# Patient Record
Sex: FEMALE | Race: WHITE | Employment: UNEMPLOYED | URBAN - METROPOLITAN AREA
[De-identification: names, ages, dates, MRNs, and addresses within clinical notes are randomized per-mention and may not be internally consistent; named-entity substitution may affect disease eponyms.]

---

## 2017-05-18 ENCOUNTER — HOSPITAL ENCOUNTER (OUTPATIENT)
Dept: MAMMOGRAPHY | Age: 37
Discharge: HOME | End: 2017-05-18
Attending: INTERNAL MEDICINE
Payer: COMMERCIAL

## 2017-05-18 DIAGNOSIS — Z12.31 ENCOUNTER FOR SCREENING MAMMOGRAM FOR BREAST CANCER: ICD-10-CM

## 2017-05-18 PROCEDURE — 77067 SCR MAMMO BI INCL CAD: CPT

## 2018-01-02 PROBLEM — F33.9 RECURRENT DEPRESSION (HCC): Status: ACTIVE | Noted: 2018-01-02

## 2018-04-27 ENCOUNTER — HOSPITAL ENCOUNTER (OUTPATIENT)
Dept: CT IMAGING | Age: 38
Discharge: HOME | End: 2018-04-27
Attending: INTERNAL MEDICINE
Payer: COMMERCIAL

## 2018-04-27 DIAGNOSIS — R07.9 CHEST PAIN, UNSPECIFIED TYPE: ICD-10-CM

## 2018-04-27 DIAGNOSIS — R06.02 SHORTNESS OF BREATH: ICD-10-CM

## 2018-04-27 PROCEDURE — 74011000258 HC RX REV CODE- 258: Performed by: INTERNAL MEDICINE

## 2018-04-27 PROCEDURE — 71260 CT THORAX DX C+: CPT

## 2018-04-27 PROCEDURE — 74011636320 HC RX REV CODE- 636/320: Performed by: INTERNAL MEDICINE

## 2018-04-27 RX ORDER — SODIUM CHLORIDE 0.9 % (FLUSH) 0.9 %
10 SYRINGE (ML) INJECTION
Status: COMPLETED | OUTPATIENT
Start: 2018-04-27 | End: 2018-04-27

## 2018-04-27 RX ADMIN — SODIUM CHLORIDE 100 ML: 900 INJECTION, SOLUTION INTRAVENOUS at 16:03

## 2018-04-27 RX ADMIN — Medication 10 ML: at 16:03

## 2018-04-27 RX ADMIN — IOPAMIDOL 100 ML: 755 INJECTION, SOLUTION INTRAVENOUS at 16:03

## 2018-08-16 ENCOUNTER — HOSPITAL ENCOUNTER (OUTPATIENT)
Dept: MAMMOGRAPHY | Age: 38
Discharge: HOME | End: 2018-08-16
Attending: INTERNAL MEDICINE

## 2018-08-16 DIAGNOSIS — Z12.39 SCREENING BREAST EXAMINATION: ICD-10-CM

## 2018-09-20 ENCOUNTER — HOSPITAL ENCOUNTER (OUTPATIENT)
Dept: MAMMOGRAPHY | Age: 38
Discharge: HOME | End: 2018-09-20
Attending: INTERNAL MEDICINE
Payer: COMMERCIAL

## 2018-09-20 PROCEDURE — 77067 SCR MAMMO BI INCL CAD: CPT

## 2018-10-29 ENCOUNTER — APPOINTMENT (OUTPATIENT)
Dept: GENERAL RADIOLOGY | Age: 38
DRG: 139 | End: 2018-10-29
Attending: EMERGENCY MEDICINE
Payer: COMMERCIAL

## 2018-10-29 ENCOUNTER — APPOINTMENT (OUTPATIENT)
Dept: CT IMAGING | Age: 38
DRG: 139 | End: 2018-10-29
Attending: EMERGENCY MEDICINE
Payer: COMMERCIAL

## 2018-10-29 ENCOUNTER — HOSPITAL ENCOUNTER (INPATIENT)
Age: 38
LOS: 8 days | Discharge: HOME | DRG: 139 | End: 2018-11-06
Attending: EMERGENCY MEDICINE | Admitting: HOSPITALIST
Payer: COMMERCIAL

## 2018-10-29 DIAGNOSIS — E53.8 B12 DEFICIENCY: ICD-10-CM

## 2018-10-29 DIAGNOSIS — G47.00 INSOMNIA, UNSPECIFIED TYPE: ICD-10-CM

## 2018-10-29 DIAGNOSIS — J18.9 COMMUNITY ACQUIRED PNEUMONIA OF LEFT LOWER LOBE OF LUNG: ICD-10-CM

## 2018-10-29 DIAGNOSIS — K21.9 GASTROESOPHAGEAL REFLUX DISEASE WITHOUT ESOPHAGITIS: ICD-10-CM

## 2018-10-29 DIAGNOSIS — R00.0 TACHYCARDIA: ICD-10-CM

## 2018-10-29 DIAGNOSIS — R04.2 HEMOPTYSIS: ICD-10-CM

## 2018-10-29 DIAGNOSIS — E44.0 PROTEIN-CALORIE MALNUTRITION, MODERATE (HCC): ICD-10-CM

## 2018-10-29 DIAGNOSIS — R09.02 HYPOXIA: ICD-10-CM

## 2018-10-29 DIAGNOSIS — J90 PLEURAL EFFUSION: ICD-10-CM

## 2018-10-29 DIAGNOSIS — F41.1 GENERALIZED ANXIETY DISORDER: ICD-10-CM

## 2018-10-29 DIAGNOSIS — E87.6 HYPOKALEMIA: ICD-10-CM

## 2018-10-29 DIAGNOSIS — K58.1 IRRITABLE BOWEL SYNDROME WITH CONSTIPATION: ICD-10-CM

## 2018-10-29 DIAGNOSIS — J18.9 COMMUNITY ACQUIRED PNEUMONIA, UNSPECIFIED LATERALITY: Primary | ICD-10-CM

## 2018-10-29 DIAGNOSIS — J20.9 ACUTE BRONCHITIS, UNSPECIFIED ORGANISM: ICD-10-CM

## 2018-10-29 LAB
ALBUMIN SERPL-MCNC: 2 G/DL (ref 3.5–5)
ALBUMIN/GLOB SERPL: 0.4 {RATIO} (ref 1.2–3.5)
ALP SERPL-CCNC: 71 U/L (ref 50–136)
ALT SERPL-CCNC: 20 U/L (ref 12–65)
ANION GAP SERPL CALC-SCNC: 13 MMOL/L (ref 7–16)
ARTERIAL PATENCY WRIST A: ABNORMAL
AST SERPL-CCNC: 27 U/L (ref 15–37)
ATRIAL RATE: 121 BPM
BACTERIA URNS QL MICRO: ABNORMAL /HPF
BDY SITE: ABNORMAL
BILIRUB SERPL-MCNC: 1.3 MG/DL (ref 0.2–1.1)
BODY TEMPERATURE: 98.6
BUN SERPL-MCNC: 13 MG/DL (ref 6–23)
CALCIUM SERPL-MCNC: 6.9 MG/DL (ref 8.3–10.4)
CALCULATED P AXIS, ECG09: 54 DEGREES
CALCULATED R AXIS, ECG10: 76 DEGREES
CALCULATED T AXIS, ECG11: -11 DEGREES
CASTS URNS QL MICRO: 0 /LPF
CHLORIDE SERPL-SCNC: 99 MMOL/L (ref 98–107)
CO2 BLD-SCNC: 28 MMOL/L
CO2 SERPL-SCNC: 25 MMOL/L (ref 21–32)
CREAT SERPL-MCNC: 0.53 MG/DL (ref 0.6–1)
CRYSTALS URNS QL MICRO: 0 /LPF
DIAGNOSIS, 93000: NORMAL
DIFFERENTIAL METHOD BLD: ABNORMAL
EPI CELLS #/AREA URNS HPF: ABNORMAL /HPF
ERYTHROCYTE [DISTWIDTH] IN BLOOD BY AUTOMATED COUNT: 12.5 %
GAS FLOW.O2 O2 DELIVERY SYS: ABNORMAL L/MIN
GLOBULIN SER CALC-MCNC: 4.5 G/DL (ref 2.3–3.5)
GLUCOSE SERPL-MCNC: 101 MG/DL (ref 65–100)
HCO3 BLDV-SCNC: 26.6 MMOL/L (ref 23–28)
HCT VFR BLD AUTO: 33.6 % (ref 35.8–46.3)
HGB BLD-MCNC: 11.6 G/DL (ref 11.7–15.4)
LACTATE BLD-SCNC: 1.87 MMOL/L (ref 0.5–1.9)
LYMPHOCYTES # BLD: 2.3 K/UL (ref 0.5–4.6)
LYMPHOCYTES NFR BLD MANUAL: 26 % (ref 16–44)
MAGNESIUM SERPL-MCNC: 2.1 MG/DL (ref 1.8–2.4)
MCH RBC QN AUTO: 30 PG (ref 26.1–32.9)
MCHC RBC AUTO-ENTMCNC: 34.5 G/DL (ref 31.4–35)
MCV RBC AUTO: 86.8 FL (ref 79.6–97.8)
METAMYELOCYTES NFR BLD MANUAL: 1 %
MONOCYTES # BLD: 0.7 K/UL (ref 0.1–1.3)
MONOCYTES NFR BLD MANUAL: 8 % (ref 3–9)
MUCOUS THREADS URNS QL MICRO: ABNORMAL /LPF
MYELOCYTES NFR BLD MANUAL: 2 %
NEUTS BAND NFR BLD MANUAL: 25 % (ref 0–10)
NEUTS SEG # BLD: 5.7 K/UL (ref 1.7–8.2)
NEUTS SEG NFR BLD MANUAL: 38 % (ref 47–75)
NRBC # BLD: 0.02 K/UL (ref 0–0.2)
OTHER OBSERVATIONS,UCOM: ABNORMAL
P-R INTERVAL, ECG05: 130 MS
PCO2 BLDV: 35.3 MMHG (ref 41–51)
PH BLDV: 7.49 [PH] (ref 7.32–7.42)
PLATELET # BLD AUTO: 307 K/UL (ref 150–450)
PLATELET COMMENTS,PCOM: ADEQUATE
PMV BLD AUTO: 10.4 FL (ref 9.4–12.3)
PO2 BLDV: 28 MMHG
POTASSIUM SERPL-SCNC: 2.6 MMOL/L (ref 3.5–5.1)
PROT SERPL-MCNC: 6.5 G/DL (ref 6.3–8.2)
Q-T INTERVAL, ECG07: 316 MS
QRS DURATION, ECG06: 68 MS
QTC CALCULATION (BEZET), ECG08: 448 MS
RBC # BLD AUTO: 3.87 M/UL (ref 4.05–5.2)
RBC #/AREA URNS HPF: ABNORMAL /HPF
SAO2 % BLDV: 58 % (ref 65–88)
SERVICE CMNT-IMP: ABNORMAL
SODIUM SERPL-SCNC: 137 MMOL/L (ref 136–145)
SPECIMEN TYPE: ABNORMAL
TROPONIN I SERPL-MCNC: <0.02 NG/ML (ref 0.02–0.05)
VENTRICULAR RATE, ECG03: 121 BPM
WBC # BLD AUTO: 8.7 K/UL (ref 4.3–11.1)
WBC MORPH BLD: ABNORMAL
WBC URNS QL MICRO: ABNORMAL /HPF

## 2018-10-29 PROCEDURE — 99285 EMERGENCY DEPT VISIT HI MDM: CPT | Performed by: EMERGENCY MEDICINE

## 2018-10-29 PROCEDURE — BH4BZZZ ULTRASONOGRAPHY OF CHEST WALL: ICD-10-PCS | Performed by: INTERNAL MEDICINE

## 2018-10-29 PROCEDURE — 71260 CT THORAX DX C+: CPT

## 2018-10-29 PROCEDURE — 83605 ASSAY OF LACTIC ACID: CPT

## 2018-10-29 PROCEDURE — 77030032490 HC SLV COMPR SCD KNE COVD -B

## 2018-10-29 PROCEDURE — 96367 TX/PROPH/DG ADDL SEQ IV INF: CPT | Performed by: EMERGENCY MEDICINE

## 2018-10-29 PROCEDURE — 74011000250 HC RX REV CODE- 250: Performed by: EMERGENCY MEDICINE

## 2018-10-29 PROCEDURE — 83735 ASSAY OF MAGNESIUM: CPT

## 2018-10-29 PROCEDURE — 65660000000 HC RM CCU STEPDOWN

## 2018-10-29 PROCEDURE — 74011250636 HC RX REV CODE- 250/636: Performed by: EMERGENCY MEDICINE

## 2018-10-29 PROCEDURE — 94640 AIRWAY INHALATION TREATMENT: CPT

## 2018-10-29 PROCEDURE — 81015 MICROSCOPIC EXAM OF URINE: CPT

## 2018-10-29 PROCEDURE — 71046 X-RAY EXAM CHEST 2 VIEWS: CPT

## 2018-10-29 PROCEDURE — 80053 COMPREHEN METABOLIC PANEL: CPT

## 2018-10-29 PROCEDURE — 96365 THER/PROPH/DIAG IV INF INIT: CPT | Performed by: EMERGENCY MEDICINE

## 2018-10-29 PROCEDURE — 96375 TX/PRO/DX INJ NEW DRUG ADDON: CPT | Performed by: EMERGENCY MEDICINE

## 2018-10-29 PROCEDURE — 87899 AGENT NOS ASSAY W/OPTIC: CPT

## 2018-10-29 PROCEDURE — 82803 BLOOD GASES ANY COMBINATION: CPT

## 2018-10-29 PROCEDURE — 74011000258 HC RX REV CODE- 258: Performed by: EMERGENCY MEDICINE

## 2018-10-29 PROCEDURE — 84484 ASSAY OF TROPONIN QUANT: CPT

## 2018-10-29 PROCEDURE — 74011636320 HC RX REV CODE- 636/320: Performed by: EMERGENCY MEDICINE

## 2018-10-29 PROCEDURE — 93005 ELECTROCARDIOGRAM TRACING: CPT | Performed by: EMERGENCY MEDICINE

## 2018-10-29 PROCEDURE — 74011250637 HC RX REV CODE- 250/637: Performed by: EMERGENCY MEDICINE

## 2018-10-29 PROCEDURE — 85025 COMPLETE CBC W/AUTO DIFF WBC: CPT

## 2018-10-29 PROCEDURE — 81003 URINALYSIS AUTO W/O SCOPE: CPT | Performed by: EMERGENCY MEDICINE

## 2018-10-29 RX ORDER — POTASSIUM CHLORIDE 29.8 MG/ML
40 INJECTION INTRAVENOUS ONCE
Status: DISCONTINUED | OUTPATIENT
Start: 2018-10-29 | End: 2018-10-29 | Stop reason: SDUPTHER

## 2018-10-29 RX ORDER — SODIUM CHLORIDE 0.9 % (FLUSH) 0.9 %
5-10 SYRINGE (ML) INJECTION EVERY 8 HOURS
Status: DISCONTINUED | OUTPATIENT
Start: 2018-10-29 | End: 2018-11-06 | Stop reason: HOSPADM

## 2018-10-29 RX ORDER — SODIUM CHLORIDE 0.9 % (FLUSH) 0.9 %
10 SYRINGE (ML) INJECTION
Status: COMPLETED | OUTPATIENT
Start: 2018-10-29 | End: 2018-10-29

## 2018-10-29 RX ORDER — ACETAMINOPHEN 325 MG/1
650 TABLET ORAL
Status: DISCONTINUED | OUTPATIENT
Start: 2018-10-29 | End: 2018-11-06 | Stop reason: HOSPADM

## 2018-10-29 RX ORDER — GUAIFENESIN 600 MG/1
600 TABLET, EXTENDED RELEASE ORAL EVERY 12 HOURS
Status: DISCONTINUED | OUTPATIENT
Start: 2018-10-29 | End: 2018-11-06 | Stop reason: HOSPADM

## 2018-10-29 RX ORDER — NALOXONE HYDROCHLORIDE 0.4 MG/ML
0.4 INJECTION, SOLUTION INTRAMUSCULAR; INTRAVENOUS; SUBCUTANEOUS AS NEEDED
Status: DISCONTINUED | OUTPATIENT
Start: 2018-10-29 | End: 2018-11-06 | Stop reason: HOSPADM

## 2018-10-29 RX ORDER — POTASSIUM CHLORIDE 14.9 MG/ML
20 INJECTION INTRAVENOUS
Status: DISPENSED | OUTPATIENT
Start: 2018-10-29 | End: 2018-10-29

## 2018-10-29 RX ORDER — BISACODYL 5 MG
5 TABLET, DELAYED RELEASE (ENTERIC COATED) ORAL DAILY PRN
Status: DISCONTINUED | OUTPATIENT
Start: 2018-10-29 | End: 2018-11-06 | Stop reason: HOSPADM

## 2018-10-29 RX ORDER — ALBUTEROL SULFATE 0.83 MG/ML
2.5 SOLUTION RESPIRATORY (INHALATION)
Status: DISCONTINUED | OUTPATIENT
Start: 2018-10-29 | End: 2018-11-06 | Stop reason: HOSPADM

## 2018-10-29 RX ORDER — IPRATROPIUM BROMIDE AND ALBUTEROL SULFATE 2.5; .5 MG/3ML; MG/3ML
3 SOLUTION RESPIRATORY (INHALATION)
Status: COMPLETED | OUTPATIENT
Start: 2018-10-29 | End: 2018-10-29

## 2018-10-29 RX ORDER — POTASSIUM CHLORIDE 20 MEQ/1
40 TABLET, EXTENDED RELEASE ORAL
Status: COMPLETED | OUTPATIENT
Start: 2018-10-29 | End: 2018-10-29

## 2018-10-29 RX ORDER — HYDROCODONE BITARTRATE AND HOMATROPINE METHYLBROMIDE 1.5; 5 MG/5ML; MG/5ML
5 SYRUP ORAL
Status: DISCONTINUED | OUTPATIENT
Start: 2018-10-29 | End: 2018-11-06 | Stop reason: HOSPADM

## 2018-10-29 RX ORDER — AZITHROMYCIN 250 MG/1
1000 TABLET, FILM COATED ORAL
Status: COMPLETED | OUTPATIENT
Start: 2018-10-29 | End: 2018-10-29

## 2018-10-29 RX ORDER — SODIUM CHLORIDE 0.9 % (FLUSH) 0.9 %
5-10 SYRINGE (ML) INJECTION AS NEEDED
Status: DISCONTINUED | OUTPATIENT
Start: 2018-10-29 | End: 2018-11-06 | Stop reason: HOSPADM

## 2018-10-29 RX ADMIN — IOPAMIDOL 100 ML: 755 INJECTION, SOLUTION INTRAVENOUS at 20:20

## 2018-10-29 RX ADMIN — Medication 5 ML: at 23:31

## 2018-10-29 RX ADMIN — Medication 10 ML: at 20:20

## 2018-10-29 RX ADMIN — AZITHROMYCIN 1000 MG: 250 TABLET, FILM COATED ORAL at 19:19

## 2018-10-29 RX ADMIN — SODIUM CHLORIDE 1000 ML: 900 INJECTION, SOLUTION INTRAVENOUS at 19:18

## 2018-10-29 RX ADMIN — POTASSIUM CHLORIDE 20 MEQ: 14.9 INJECTION, SOLUTION INTRAVENOUS at 20:06

## 2018-10-29 RX ADMIN — SODIUM CHLORIDE 12.5 MG: 9 INJECTION INTRAMUSCULAR; INTRAVENOUS; SUBCUTANEOUS at 19:32

## 2018-10-29 RX ADMIN — IPRATROPIUM BROMIDE AND ALBUTEROL SULFATE 3 ML: .5; 3 SOLUTION RESPIRATORY (INHALATION) at 19:18

## 2018-10-29 RX ADMIN — CEFTRIAXONE SODIUM 2 G: 2 INJECTION, POWDER, FOR SOLUTION INTRAMUSCULAR; INTRAVENOUS at 19:18

## 2018-10-29 RX ADMIN — SODIUM CHLORIDE 100 ML: 900 INJECTION, SOLUTION INTRAVENOUS at 20:20

## 2018-10-29 RX ADMIN — POTASSIUM CHLORIDE 40 MEQ: 20 TABLET, EXTENDED RELEASE ORAL at 19:32

## 2018-10-29 NOTE — ED NOTES
Received report from logan conway. Pt laying in bed. A&OX4. Shallow breathes noted. Pt was 84% on RA. Placed pt on 3L N[place]. Pt stayed 88%. Placed pt on non re breather and made md aware. - nebs orderd and resp notified. Pt states she is c/o cp and sob. Pts family stated it has been going on \"for awhile\" and was diagnosed with gastritis. Pt states the oxygen makes he nauseated and wants pain meds. Made md aware.

## 2018-10-29 NOTE — ED PROVIDER NOTES
27-year-old female presenting with cough, shortness of breath and hemoptysis. States several days ago she had some fevers and today she coughed up some blood. She reports sore throat as well as nasal congestion. History of previous pneumonia. Patient is a smoker. The history is provided by the patient. No  was used. Hemoptysis Associated symptoms include cough. Pertinent negatives include no fever, no abdominal pain, no headaches and no headaches. Past Medical History:  
Diagnosis Date  Anorexia  Anxiety  Depressive disorder, not elsewhere classified 4/9/2015  GERD (gastroesophageal reflux disease)  Loss of appetite  Pneumonia 7/20/15  Unspecified vitamin B deficiency 4/9/2015  Urinary tract infection, site not specified 4/9/2015 Past Surgical History:  
Procedure Laterality Date  HX COLONOSCOPY  last 2007 Edie Godinez  HX CYST INCISION AND DRAINAGE Right   
 breast  
 HX GI    
 anal sphincterotomy  HX LAP CHOLECYSTECTOMY  2005  HX TUBAL LIGATION  2001 Family History:  
Problem Relation Age of Onset  Cancer Mother   
     breast  
 Breast Cancer Mother  No Known Problems Father Social History Socioeconomic History  Marital status:  Spouse name: Not on file  Number of children: Not on file  Years of education: Not on file  Highest education level: Not on file Social Needs  Financial resource strain: Not on file  Food insecurity - worry: Not on file  Food insecurity - inability: Not on file  Transportation needs - medical: Not on file  Transportation needs - non-medical: Not on file Occupational History  Not on file Tobacco Use  Smoking status: Current Every Day Smoker Packs/day: 1.00 Years: 17.00 Pack years: 17.00  Smokeless tobacco: Never Used Substance and Sexual Activity  Alcohol use: No  
 Drug use:  No  
  Sexual activity: Yes Birth control/protection: Surgical  
Other Topics Concern  Not on file Social History Narrative  Not on file ALLERGIES: Flagyl [metronidazole] and Zyprexa [olanzapine] Review of Systems Constitutional: Negative for fatigue and fever. HENT: Negative for sore throat. Respiratory: Positive for cough, hemoptysis and shortness of breath. Negative for chest tightness and wheezing. Hemoptysis Cardiovascular: Negative for leg swelling. Gastrointestinal: Negative for abdominal pain. Genitourinary: Negative for dysuria. Musculoskeletal: Negative for back pain. Skin: Negative for rash. Neurological: Negative for syncope and headaches. Psychiatric/Behavioral: Negative for confusion. Vitals:  
 10/29/18 1635 BP: 118/80 Pulse: (!) 123 Resp: 18 Temp: 98.3 °F (36.8 °C) SpO2: (!) 88% Physical Exam  
Constitutional: She is oriented to person, place, and time. She appears well-developed and well-nourished. She appears distressed. HENT:  
Head: Normocephalic and atraumatic. Eyes: Conjunctivae and EOM are normal. Pupils are equal, round, and reactive to light. Neck: Normal range of motion. Neck supple. Cardiovascular: Normal rate, regular rhythm and normal heart sounds. Pulmonary/Chest: Breath sounds normal. No respiratory distress. She has no wheezes. She has no rales. Poor inspiratory effort. No significant wheezes or rales appreciated Abdominal: Soft. She exhibits no distension. There is no tenderness. There is no rebound. Musculoskeletal: Normal range of motion. She exhibits no edema or tenderness. Neurological: She is alert and oriented to person, place, and time. Skin: Skin is warm and dry. No rash noted. She is not diaphoretic. Psychiatric: She has a normal mood and affect. Her behavior is normal.  
Vitals reviewed. MDM Number of Diagnoses or Management Options Community acquired pneumonia, unspecified laterality: new and requires workup Hypoxia: new and requires workup Diagnosis management comments: Patient persistently hypoxic in the emergency department CT and x-ray concerning for pneumonia. Treated with ceftriaxone and azithromycin in the emergency department. Will admit to hospitalist for further evaluation and treatment. Updated patient and family are in agreement with plan. Jennie Corona MD; 10/30/2018 @12:29 AM Voice dictation software was used during the making of this note. This software is not perfect and grammatical and other typographical errors may be present. This note has not been proofread for errors. 
=================================================================== Amount and/or Complexity of Data Reviewed Clinical lab tests: ordered and reviewed (Results for orders placed or performed during the hospital encounter of 10/29/18 
-CBC WITH AUTOMATED DIFF Result                      Value             Ref Range WBC                         8.7               4.3 - 11.1 K* 
     RBC                         3.87 (L)          4.05 - 5.2 M* HGB                         11.6 (L)          11.7 - 15.4 * HCT                         33.6 (L)          35.8 - 46.3 % MCV                         86.8              79.6 - 97.8 * MCH                         30.0              26.1 - 32.9 * MCHC                        34.5              31.4 - 35.0 * RDW                         12.5              % PLATELET                    307               150 - 450 K/* MPV                         10.4              9.4 - 12.3 FL ABSOLUTE NRBC               0.02              0.0 - 0.2 K/* NEUTROPHILS                 38 (L)            47 - 75 % BAND NEUTROPHILS            25 (H)            0 - 10 % LYMPHOCYTES                 26                16 - 44 % MONOCYTES                   8                 3 - 9 % METAMYELOCYTES              1                 % MYELOCYTES                  2                 % ABS. NEUTROPHILS            5.7               1.7 - 8.2 K/* ABS. LYMPHOCYTES            2.3               0.5 - 4.6 K/* ABS. MONOCYTES              0.7               0.1 - 1.3 K/* WBC COMMENTS                MODERATE PLATELET COMMENTS           ADEQUATE                        
     DF                          MANUAL                          
-METABOLIC PANEL, COMPREHENSIVE Result                      Value             Ref Range Sodium                      137               136 - 145 mm* Potassium                   2.6 (LL)          3.5 - 5.1 mm* Chloride                    99                98 - 107 mmo* CO2                         25                21 - 32 mmol* Anion gap                   13                7 - 16 mmol/L Glucose                     101 (H)           65 - 100 mg/* BUN                         13                6 - 23 MG/DL Creatinine                  0.53 (L)          0.6 - 1.0 MG* 
     GFR est AA                  >60               >60 ml/min/1* GFR est non-AA              >60               >60 ml/min/1* Calcium                     6.9 (L)           8.3 - 10.4 M* Bilirubin, total            1.3 (H)           0.2 - 1.1 MG* ALT (SGPT)                  20                12 - 65 U/L   
     AST (SGOT)                  27                15 - 37 U/L Alk. phosphatase            71                50 - 136 U/L Protein, total              6.5               6.3 - 8.2 g/* Albumin                     2.0 (L)           3.5 - 5.0 g/* Globulin                    4.5 (H)           2.3 - 3.5 g/* A-G Ratio                   0.4 (L)           1.2 - 3.5 -TROPONIN I Result                      Value             Ref Range Troponin-I, Qt.             <0.02 (L)         0.02 - 0.05 * 
-URINE MICROSCOPIC Result                      Value             Ref Range WBC                         5-10              0 /hpf        
     RBC                         3-5               0 /hpf Epithelial cells            5-10              0 /hpf Bacteria                    TRACE             0 /hpf Casts                       0                 0 /lpf Crystals, urine             0                 0 /LPF Mucus                       2+ (H)            0 /lpf Other observations RESULTS VERIFIED MANUALLY 
-MAGNESIUM Result                      Value             Ref Range Magnesium                   2.1               1.8 - 2.4 mg* 
-POC LACTIC ACID Result                      Value             Ref Range Lactic Acid (POC)           1.87              0.5 - 1.9 mm* 
-POC VENOUS BLOOD GAS Result                      Value             Ref Range Device:                     ROOM AIR                        
     pH, venous (POC)            7.485 (H)         7.32 - 7.42   
     pCO2, venous (POC)          35.3 (L)          41 - 51 MMHG  
     pO2, venous (POC)           28 (LL)           mmHg HCO3, venous (POC)          26.6              23 - 28 MMOL* 
     sO2, venous (POC)           58 (L)            65 - 88 % Allens test (POC) NOT APPLICABLE Site                        OTHER Patient temp. 98.6 Specimen type (POC)         VENOUS BLOOD Performed by Julianna CO2, POC                    28                MMOL/L        
-EKG, 12 LEAD, INITIAL Result                      Value             Ref Range Ventricular Rate            121               BPM           
     Atrial Rate                 121               BPM           
     P-R Interval                130               ms            
     QRS Duration                68                ms Q-T Interval                316               ms            
     QTC Calculation (Bezet)     448               ms            
     Calculated P Axis           54                degrees Calculated R Axis           76                degrees Calculated T Axis           -11               degrees Diagnosis Sinus tachycardia Non-specific ST-t wave changes Abnormal ECG No previous ECGs available Confirmed by ST ENA STOLL MD (), EDI JUAREZ ([id number]) on 10/29/2018 10:34:11 PM 
  
) Tests in the radiology section of CPT®: ordered and reviewed (Xr Chest Pa Lat Result Date: 10/29/2018 AP LATERAL CHEST X-RAY HISTORY: 3 days of shortness of breath. Weakness. Dizziness. COMPARISON: None FINDINGS: Consolidation is present throughout the left lower lobe and to a lesser extent in the right lung base. There are no large pleural effusions. Cholecystectomy clips are present. IMPRESSION: Lower lobe consolidation. Ct Chest W Cont Result Date: 10/29/2018 CT CHEST WITH CONTRAST 10/29/2018 HISTORY: Low oxygen saturation. Gastritis. Patient coughing up bright red blood. TECHNIQUE: The patient received 100 mL Isovue-370 nonionic IV contrast and axial images were obtained through the chest. Coronal reformatted images were generated.    All CT scans at this facility used dose modulation, interactive reconstruction and/or weight based dosing when appropriate to reduce radiation dose to as low as reasonably achievable. COMPARISON: April 27, 2018 FINDINGS: There are no filling defects within the main or proximal segmental pulmonary artery suggestive of emboli. The thoracic aorta is well-opacified without aneurysm or dissection. Extensive consolidation is present throughout the left lower lobe. Reticulonodular opacities are present throughout the right lower lobe, lingula and left upper lobe. A few of the right lower lobe distal airways are partially opacified. The distal esophagus is distended and contains minimal dependent fluid. There is no axillary adenopathy. A few prominent mediastinal lymph nodes are present including a prominent prevascular node and subcarinal node. These may be reactive adenopathy in the setting of pneumonia. Included images of the upper abdomen demonstrate cholecystectomy clips. The adrenal glands are normal in appearance. There are no aggressive osseous lesions. IMPRESSION: 1. No pulmonary embolism. 2. Extensive multi lobar consolidation with most significant involvement of the left lower lobe. ) Review and summarize past medical records: yes Discuss the patient with other providers: yes Independent visualization of images, tracings, or specimens: yes Risk of Complications, Morbidity, and/or Mortality Presenting problems: high Diagnostic procedures: moderate Management options: moderate Patient Progress Patient progress: stable ED Course as of Oct 30 0028 Mon Oct 29, 2018  
2124 Patient persistently hypoxic in the emergency department. CT demonstrates no evidence of pulmonary embolism but extensive findings concerning for pneumonia. We'll admit to the hospitalist for further evaluation and treatment. [JL] ED Course User Index [JL] Vadim Dudley MD  
 
 
Procedures

## 2018-10-30 LAB
ANION GAP SERPL CALC-SCNC: 8 MMOL/L (ref 7–16)
BUN SERPL-MCNC: 14 MG/DL (ref 6–23)
CALCIUM SERPL-MCNC: 6.9 MG/DL (ref 8.3–10.4)
CHLORIDE SERPL-SCNC: 107 MMOL/L (ref 98–107)
CO2 SERPL-SCNC: 25 MMOL/L (ref 21–32)
CREAT SERPL-MCNC: 0.43 MG/DL (ref 0.6–1)
CRP SERPL-MCNC: 17.1 MG/DL (ref 0–0.9)
GLUCOSE SERPL-MCNC: 97 MG/DL (ref 65–100)
POTASSIUM SERPL-SCNC: 3.2 MMOL/L (ref 3.5–5.1)
PROCALCITONIN SERPL-MCNC: 0.9 NG/ML
SODIUM SERPL-SCNC: 140 MMOL/L (ref 136–145)

## 2018-10-30 PROCEDURE — 94640 AIRWAY INHALATION TREATMENT: CPT

## 2018-10-30 PROCEDURE — 86038 ANTINUCLEAR ANTIBODIES: CPT

## 2018-10-30 PROCEDURE — 74011250636 HC RX REV CODE- 250/636: Performed by: FAMILY MEDICINE

## 2018-10-30 PROCEDURE — 86140 C-REACTIVE PROTEIN: CPT

## 2018-10-30 PROCEDURE — 87070 CULTURE OTHR SPECIMN AEROBIC: CPT

## 2018-10-30 PROCEDURE — 74011000258 HC RX REV CODE- 258: Performed by: INTERNAL MEDICINE

## 2018-10-30 PROCEDURE — 65660000000 HC RM CCU STEPDOWN

## 2018-10-30 PROCEDURE — 74011250636 HC RX REV CODE- 250/636: Performed by: INTERNAL MEDICINE

## 2018-10-30 PROCEDURE — 99254 IP/OBS CNSLTJ NEW/EST MOD 60: CPT | Performed by: INTERNAL MEDICINE

## 2018-10-30 PROCEDURE — 86235 NUCLEAR ANTIGEN ANTIBODY: CPT

## 2018-10-30 PROCEDURE — 94760 N-INVAS EAR/PLS OXIMETRY 1: CPT

## 2018-10-30 PROCEDURE — 36415 COLL VENOUS BLD VENIPUNCTURE: CPT

## 2018-10-30 PROCEDURE — 87205 SMEAR GRAM STAIN: CPT

## 2018-10-30 PROCEDURE — 87040 BLOOD CULTURE FOR BACTERIA: CPT

## 2018-10-30 PROCEDURE — 84145 PROCALCITONIN (PCT): CPT

## 2018-10-30 PROCEDURE — 74011250637 HC RX REV CODE- 250/637: Performed by: INTERNAL MEDICINE

## 2018-10-30 PROCEDURE — 74011250637 HC RX REV CODE- 250/637: Performed by: HOSPITALIST

## 2018-10-30 PROCEDURE — 74011250636 HC RX REV CODE- 250/636: Performed by: HOSPITALIST

## 2018-10-30 PROCEDURE — 74011000250 HC RX REV CODE- 250: Performed by: INTERNAL MEDICINE

## 2018-10-30 PROCEDURE — 80048 BASIC METABOLIC PNL TOTAL CA: CPT

## 2018-10-30 RX ORDER — PANTOPRAZOLE SODIUM 40 MG/1
40 TABLET, DELAYED RELEASE ORAL DAILY
Status: DISCONTINUED | OUTPATIENT
Start: 2018-10-31 | End: 2018-10-30 | Stop reason: SDUPTHER

## 2018-10-30 RX ORDER — SODIUM CHLORIDE FOR INHALATION 3 %
4 VIAL, NEBULIZER (ML) INHALATION ONCE
Status: COMPLETED | OUTPATIENT
Start: 2018-10-30 | End: 2018-10-30

## 2018-10-30 RX ORDER — POTASSIUM CHLORIDE 20 MEQ/1
40 TABLET, EXTENDED RELEASE ORAL 2 TIMES DAILY
Status: COMPLETED | OUTPATIENT
Start: 2018-10-30 | End: 2018-10-31

## 2018-10-30 RX ORDER — ONDANSETRON 2 MG/ML
4 INJECTION INTRAMUSCULAR; INTRAVENOUS
Status: DISCONTINUED | OUTPATIENT
Start: 2018-10-30 | End: 2018-11-06 | Stop reason: HOSPADM

## 2018-10-30 RX ORDER — SUCRALFATE 1 G/10ML
0.5 SUSPENSION ORAL
Status: DISCONTINUED | OUTPATIENT
Start: 2018-10-30 | End: 2018-11-06 | Stop reason: HOSPADM

## 2018-10-30 RX ORDER — PANTOPRAZOLE SODIUM 40 MG/1
40 TABLET, DELAYED RELEASE ORAL
Status: DISCONTINUED | OUTPATIENT
Start: 2018-10-31 | End: 2018-11-02 | Stop reason: ALTCHOICE

## 2018-10-30 RX ORDER — SODIUM CHLORIDE 9 MG/ML
50 INJECTION, SOLUTION INTRAVENOUS CONTINUOUS
Status: DISCONTINUED | OUTPATIENT
Start: 2018-10-31 | End: 2018-11-01

## 2018-10-30 RX ORDER — PROMETHAZINE HYDROCHLORIDE 25 MG/1
25 TABLET ORAL
Status: DISCONTINUED | OUTPATIENT
Start: 2018-10-30 | End: 2018-11-06 | Stop reason: HOSPADM

## 2018-10-30 RX ORDER — LEVOTHYROXINE SODIUM 50 UG/1
25 TABLET ORAL
Status: DISCONTINUED | OUTPATIENT
Start: 2018-10-31 | End: 2018-11-06 | Stop reason: HOSPADM

## 2018-10-30 RX ORDER — MELATONIN
2000 DAILY
Status: DISCONTINUED | OUTPATIENT
Start: 2018-10-31 | End: 2018-11-06 | Stop reason: HOSPADM

## 2018-10-30 RX ORDER — QUETIAPINE FUMARATE 100 MG/1
200 TABLET, FILM COATED ORAL
Status: DISCONTINUED | OUTPATIENT
Start: 2018-10-30 | End: 2018-11-06 | Stop reason: HOSPADM

## 2018-10-30 RX ADMIN — Medication 10 ML: at 06:03

## 2018-10-30 RX ADMIN — PROMETHAZINE HYDROCHLORIDE 25 MG: 25 TABLET ORAL at 18:25

## 2018-10-30 RX ADMIN — AZITHROMYCIN MONOHYDRATE 500 MG: 500 INJECTION, POWDER, LYOPHILIZED, FOR SOLUTION INTRAVENOUS at 17:21

## 2018-10-30 RX ADMIN — SODIUM CHLORIDE 100 ML/HR: 900 INJECTION, SOLUTION INTRAVENOUS at 23:54

## 2018-10-30 RX ADMIN — AMPICILLIN SODIUM AND SULBACTAM SODIUM 3 G: 2; 1 INJECTION, POWDER, FOR SOLUTION INTRAMUSCULAR; INTRAVENOUS at 19:32

## 2018-10-30 RX ADMIN — HYDROCODONE BITARTRATE AND HOMATROPINE METHYLBROMIDE 5 ML: 5; 1.5 SOLUTION ORAL at 19:36

## 2018-10-30 RX ADMIN — SODIUM CHLORIDE 30 MG/ML INHALATION SOLUTION 4 ML: 30 SOLUTION INHALANT at 13:41

## 2018-10-30 RX ADMIN — ACETAMINOPHEN 650 MG: 325 TABLET, FILM COATED ORAL at 08:54

## 2018-10-30 RX ADMIN — SUCRALFATE 0.5 G: 1 SUSPENSION ORAL at 21:43

## 2018-10-30 RX ADMIN — QUETIAPINE FUMARATE 200 MG: 100 TABLET ORAL at 21:43

## 2018-10-30 RX ADMIN — AMPICILLIN SODIUM AND SULBACTAM SODIUM 3 G: 2; 1 INJECTION, POWDER, FOR SOLUTION INTRAMUSCULAR; INTRAVENOUS at 14:02

## 2018-10-30 RX ADMIN — POTASSIUM CHLORIDE 40 MEQ: 20 TABLET, EXTENDED RELEASE ORAL at 17:20

## 2018-10-30 RX ADMIN — ONDANSETRON 4 MG: 2 INJECTION INTRAMUSCULAR; INTRAVENOUS at 06:03

## 2018-10-30 RX ADMIN — SUCRALFATE 0.5 G: 1 SUSPENSION ORAL at 17:20

## 2018-10-30 RX ADMIN — GUAIFENESIN 600 MG: 600 TABLET, EXTENDED RELEASE ORAL at 21:43

## 2018-10-30 RX ADMIN — Medication 10 ML: at 21:43

## 2018-10-30 RX ADMIN — GUAIFENESIN 600 MG: 600 TABLET, EXTENDED RELEASE ORAL at 08:54

## 2018-10-30 RX ADMIN — ACETAMINOPHEN 650 MG: 325 TABLET, FILM COATED ORAL at 14:01

## 2018-10-30 RX ADMIN — Medication 5 ML: at 15:16

## 2018-10-30 RX ADMIN — PROMETHAZINE HYDROCHLORIDE 25 MG: 25 TABLET ORAL at 14:01

## 2018-10-30 RX ADMIN — HYDROCODONE BITARTRATE AND HOMATROPINE METHYLBROMIDE 5 ML: 5; 1.5 SOLUTION ORAL at 01:36

## 2018-10-30 NOTE — PROGRESS NOTES
Alert and oriented  Remote sinus tach cough is productive however patient keeps putting tissue in specimen collection  Instructed to spit sputum in cup  Says she will try  Oxygen via cannula  Refuses breakfast  Offered to get another food selection  Patient declines

## 2018-10-30 NOTE — PROGRESS NOTES
Patient slept off and on throughout shift. Patient awake in bed at this time. Respirations present. No signs of distress at this time. Bed low and locked. Call light within reach. Will continue to monitor.

## 2018-10-30 NOTE — H&P
Hospitalist H&P/Consult Note Admit Date:  10/29/2018  6:38 PM  
Name:  Araceli Ruffin Age:  45 y.o. 
:  1980 MRN:  [id number] PCP:  Toni Whitehead DO Treatment Team: Attending Provider: Olivia Arnett MD 
 
HPI:  
Patient is a 43 y/o female with hx GERD, anxiety, depression, hypothyroidism, anorexia who presents to ED with 1 week of progressive shortness of breath, cough and now hemoptysis. Was seen last week and thought she had gastritis and received medications for this. Saw her PCP today and was tachypneic, tachycardic with chest pain and hemoptysis. Then sent to ED to r/o PE or pneumonia. CT of chest negative for PE but she has extensive consolidation in left lower lobe and opacities in RLL, lingula and LLL. O2 sats were 87% on RA. She has no known lung disease such as asthma or COPD but does smoke. She received IV rocephin and azithromycin in ED. Hospitalist service consulted for admission and further treatment. 10 systems reviewed and negative except as noted in HPI. Past Medical History:  
Diagnosis Date  Anorexia  Anxiety  Depressive disorder, not elsewhere classified 2015  GERD (gastroesophageal reflux disease)  Loss of appetite  Pneumonia 7/20/15  Unspecified vitamin B deficiency 2015  Urinary tract infection, site not specified 2015 Past Surgical History:  
Procedure Laterality Date  HX COLONOSCOPY  last  [facility]  HX CYST INCISION AND DRAINAGE Right   
 breast  
 HX GI    
 anal sphincterotomy  HX LAP CHOLECYSTECTOMY    HX TUBAL LIGATION   Prior to Admission Medications Prescriptions Last Dose Informant Patient Reported? Taking? Cholecalciferol, Vitamin D3, (VITAMIN D3) 2,000 unit cap capsule   No No  
Sig: Take 2,000 Units by mouth two (2) times a day. Indications: VITAMIN D DEFICIENCY  
QUEtiapine (SEROQUEL) 100 mg tablet   No No  
Sig: Take 2 Tabs by mouth nightly. SURPRISE!  No problems.  Normal fetal anatomy ultrasound with concordant dating.  Reviewed ultrasound and limitations with patient.    cyanocobalamin (VITAMIN B12) 1,000 mcg/mL injection   No No  
Sig: Needles/syringe-50#Dx b12 deficiency,,,DOSE 1 CC EVERY 2 WEEKS  
levothyroxine (SYNTHROID) 25 mcg tablet   No No  
Si a day empty stomach  Indications: hypothyroidism  
pantoprazole (PROTONIX) 40 mg tablet   No No  
Sig: Take 1 Tab by mouth daily. promethazine (PHENERGAN) 25 mg tablet   No No  
Sig: Take 1 Tab by mouth every six (6) hours as needed for Nausea. sucralfate (CARAFATE) 100 mg/mL suspension   No No  
Sig: Take 5 mL by mouth four (4) times daily. Facility-Administered Medications: None Allergies Allergen Reactions  Flagyl [Metronidazole] Nausea and Vomiting  Zyprexa [Olanzapine] Other (comments) Upset stomach Social History Tobacco Use  Smoking status: Current Every Day Smoker Packs/day: 1.00 Years: 17.00 Pack years: 17.00  Smokeless tobacco: Never Used Substance Use Topics  Alcohol use: No  
  
Family History Problem Relation Age of Onset  Cancer Mother   
     breast  
 Breast Cancer Mother  No Known Problems Father Immunization History Administered Date(s) Administered  Influenza Vaccine 10/07/2015, 10/03/2017  Influenza Vaccine (Quad) PF 10/04/2016, 10/18/2018 Objective:  
 
Patient Vitals for the past 24 hrs: 
 Temp Pulse Resp BP SpO2  
10/29/18 2152  (!) 116  102/75 96 % 10/29/18 2103  (!) 120  (!) 117/92 92 % 10/29/18 1930     92 % 10/29/18 1920     93 % 10/29/18 1918     (!) 88 % 10/29/18 1906     (!) 88 % 10/29/18 190  (!) 118 28 119/72 (!) 88 % 10/29/18 1635 98.3 °F (36.8 °C) (!) 123 18 118/80 (!) 88 % Oxygen Therapy O2 Sat (%): 96 % (10/29/18 2152) Pulse via Oximetry: 125 beats per minute (10/29/18 1918) O2 Device: Non-rebreather mask (10/29/18 2152) O2 Flow Rate (L/min): 15 l/min (10/29/18 2152) ETCO2 (mmHg): 3 mmHg (10/29/18 1906) No intake or output data in the 24 hours ending 10/29/18 0530 Physical Exam: 
General:    thin. Alert. Ill-appearing Eyes:   Normal sclera. Extraocular movements intact. ENT:  Normocephalic, atraumatic. Moist mucous membranes CV:   tachycardic. No murmur, rub, or gallop. Lungs: Tachypneic, bilateral rales L>R, some wheeze Abdomen: Soft, nontender, nondistended. Bowel sounds normal.  
Extremities: Warm and dry. No cyanosis or edema. Neurologic: CN II-XII grossly intact. Sensation intact. Skin:     No rashes or jaundice. No wounds. decreased turgor Psych:  Normal mood and flat affect. I reviewed the labs, imaging, EKGs, telemetry, and other studies done this admission. Data Review:  
Recent Results (from the past 24 hour(s)) AMB POC HEMOGLOBIN (HGB) Collection Time: 10/29/18  3:03 PM  
Result Value Ref Range Hemoglobin (POC) 11.2 EKG, 12 LEAD, INITIAL Collection Time: 10/29/18  4:39 PM  
Result Value Ref Range Ventricular Rate 121 BPM  
 Atrial Rate 121 BPM  
 P-R Interval 130 ms QRS Duration 68 ms Q-T Interval 316 ms  
 QTC Calculation (Bezet) 448 ms Calculated P Axis 54 degrees Calculated R Axis 76 degrees Calculated T Axis -11 degrees Diagnosis Sinus tachycardia Non-specific ST-t wave changes Abnormal ECG No previous ECGs available Confirmed by ST ENA STOLL MD (), EDI JUAREZ ([id number]) on 10/29/2018 10:34:11 PM 
  
CBC WITH AUTOMATED DIFF Collection Time: 10/29/18  4:45 PM  
Result Value Ref Range WBC 8.7 4.3 - 11.1 K/uL  
 RBC 3.87 (L) 4.05 - 5.2 M/uL  
 HGB 11.6 (L) 11.7 - 15.4 g/dL HCT 33.6 (L) 35.8 - 46.3 % MCV 86.8 79.6 - 97.8 FL  
 MCH 30.0 26.1 - 32.9 PG  
 MCHC 34.5 31.4 - 35.0 g/dL  
 RDW 12.5 % PLATELET 049 570 - 340 K/uL MPV 10.4 9.4 - 12.3 FL ABSOLUTE NRBC 0.02 0.0 - 0.2 K/uL NEUTROPHILS 38 (L) 47 - 75 % BAND NEUTROPHILS 25 (H) 0 - 10 % LYMPHOCYTES 26 16 - 44 % MONOCYTES 8 3 - 9 % METAMYELOCYTES 1 % MYELOCYTES 2 %  
 ABS. NEUTROPHILS 5.7 1.7 - 8.2 K/UL  
 ABS. LYMPHOCYTES 2.3 0.5 - 4.6 K/UL  
 ABS. MONOCYTES 0.7 0.1 - 1.3 K/UL  
 WBC COMMENTS MODERATE PLATELET COMMENTS ADEQUATE    
 DF MANUAL METABOLIC PANEL, COMPREHENSIVE Collection Time: 10/29/18  4:45 PM  
Result Value Ref Range Sodium 137 136 - 145 mmol/L Potassium 2.6 (LL) 3.5 - 5.1 mmol/L Chloride 99 98 - 107 mmol/L  
 CO2 25 21 - 32 mmol/L Anion gap 13 7 - 16 mmol/L Glucose 101 (H) 65 - 100 mg/dL BUN 13 6 - 23 MG/DL Creatinine 0.53 (L) 0.6 - 1.0 MG/DL  
 GFR est AA >60 >60 ml/min/1.73m2 GFR est non-AA >60 >60 ml/min/1.73m2 Calcium 6.9 (L) 8.3 - 10.4 MG/DL Bilirubin, total 1.3 (H) 0.2 - 1.1 MG/DL  
 ALT (SGPT) 20 12 - 65 U/L  
 AST (SGOT) 27 15 - 37 U/L Alk. phosphatase 71 50 - 136 U/L Protein, total 6.5 6.3 - 8.2 g/dL Albumin 2.0 (L) 3.5 - 5.0 g/dL Globulin 4.5 (H) 2.3 - 3.5 g/dL A-G Ratio 0.4 (L) 1.2 - 3.5    
TROPONIN I Collection Time: 10/29/18  4:45 PM  
Result Value Ref Range Troponin-I, Qt. <0.02 (L) 0.02 - 0.05 NG/ML  
MAGNESIUM Collection Time: 10/29/18  4:45 PM  
Result Value Ref Range Magnesium 2.1 1.8 - 2.4 mg/dL POC LACTIC ACID Collection Time: 10/29/18  4:49 PM  
Result Value Ref Range Lactic Acid (POC) 1.87 0.5 - 1.9 mmol/L  
POC VENOUS BLOOD GAS Collection Time: 10/29/18  7:13 PM  
Result Value Ref Range Device: ROOM AIR    
 pH, venous (POC) 7.485 (H) 7.32 - 7.42    
 pCO2, venous (POC) 35.3 (L) 41 - 51 MMHG  
 pO2, venous (POC) 28 (LL) mmHg HCO3, venous (POC) 26.6 23 - 28 MMOL/L  
 sO2, venous (POC) 58 (L) 65 - 88 % Allens test (POC) NOT APPLICABLE Site OTHER Patient temp. 98.6 Specimen type (POC) VENOUS BLOOD Performed by Julianna   
 CO2, POC 28 MMOL/L  
URINE MICROSCOPIC Collection Time: 10/29/18  7:27 PM  
Result Value Ref Range WBC 5-10 0 /hpf  
 RBC 3-5 0 /hpf  Epithelial cells 5-10 0 /hpf  
 Bacteria TRACE 0 /hpf Casts 0 0 /lpf Crystals, urine 0 0 /LPF Mucus 2+ (H) 0 /lpf Other observations RESULTS VERIFIED MANUALLY Imaging Studies: CXR Results  (Last 48 hours) 10/29/18 1735  XR CHEST PA LAT Final result Impression:  IMPRESSION: Lower lobe consolidation. Narrative:  AP LATERAL CHEST X-RAY HISTORY: 3 days of shortness of breath. Weakness. Dizziness. COMPARISON: None FINDINGS: Consolidation is present throughout the left lower lobe and to a  
lesser extent in the right lung base. There are no large pleural effusions. Cholecystectomy clips are present. CT Results  (Last 48 hours) 10/29/18 2048  CT CHEST W CONT Final result Impression:  IMPRESSION:  
   
1. No pulmonary embolism. 2. Extensive multi lobar consolidation with most significant involvement of the  
left lower lobe. Narrative:  CT CHEST WITH CONTRAST 10/29/2018 HISTORY: Low oxygen saturation. Gastritis. Patient coughing up bright red blood. TECHNIQUE: The patient received 100 mL Isovue-370 nonionic IV contrast and axial  
images were obtained through the chest. Coronal reformatted images were  
generated. All CT scans at this facility used dose modulation, interactive  
reconstruction and/or weight based dosing when appropriate to reduce radiation  
dose to as low as reasonably achievable. COMPARISON: April 27, 2018 FINDINGS: There are no filling defects within the main or proximal segmental  
pulmonary artery suggestive of emboli. The thoracic aorta is well-opacified  
without aneurysm or dissection. Extensive consolidation is present throughout the left lower lobe. Reticulonodular opacities are present throughout the right lower lobe, lingula  
and left upper lobe. A few of the right lower lobe distal airways are partially  
opacified. The distal esophagus is distended and contains minimal dependent fluid.  
   
There is no axillary adenopathy. A few prominent mediastinal lymph nodes are  
present including a prominent prevascular node and subcarinal node. These may be  
reactive adenopathy in the setting of pneumonia. Included images of the upper abdomen demonstrate cholecystectomy clips. The  
adrenal glands are normal in appearance. There are no aggressive osseous  
lesions. Assessment and Plan:  
 
Hospital Problems as of 10/29/2018 Date Reviewed: 10/29/2018 Codes Class Noted - Resolved POA * (Principal) CAP (community acquired pneumonia) ICD-10-CM: J18.9 ICD-9-CM: 995  10/29/2018 - Present Yes Hypokalemia ICD-10-CM: E87.6 ICD-9-CM: 276.8  10/29/2018 - Present Yes Hemoptysis ICD-10-CM: R04.2 ICD-9-CM: 786.30  10/29/2018 - Present Yes Hypoxia ICD-10-CM: R09.02 
ICD-9-CM: 799.02  10/29/2018 - Present Yes Tachycardia ICD-10-CM: R00.0 ICD-9-CM: 785.0  10/29/2018 - Present Yes Esophageal reflux ICD-10-CM: K21.9 ICD-9-CM: 530.81  4/9/2015 - Present Yes PLAN: 
· Admit patient to remote telemetry, Inpatient · Continue IV rocephin and azithromycin for CAP 
· F/u blood cultures. Send sputum for culture · Check strep pneumo urine antigem · Flutter valve, prn albuterol nebulizers · Hycodan and mucinex for cough and congestion · Consult Pulmonary in am regarding hemoptysis · Use SCDs for DVT prophylaxis. No blood thinners w hemoptysis · Supplemental O2 to maintain O2 sats in 90s · Continue to replace potassium. · Provide smoking cessation counseling · Discussed with patient's mother at bedside Estimated LOS:  Greater than 2 midnights Signed: 
Tani Carvajal MD

## 2018-10-30 NOTE — PROGRESS NOTES
PRN tylenol given for c/o generalized pain. PRN phenergan given c/o nausea. Patient unable to swallow phenergan.

## 2018-10-30 NOTE — H&P (VIEW-ONLY)
CONSULT NOTE Gunner Schmidtster 10/30/2018 Date of Admission:  10/29/2018 The patient's chart is reviewed and the patient is discussed with the staff. Subjective:  
 
Patient is a 45 y.o.  female seen and evaluated at the request of Dr. Myron Montano. She has h/o smoking, though quit 1 year ago and was vaping for the past year. H/o pneumonia in 2015 which she doesn't remember the specifics for. She denies h/o asthma or COPD. She normally is able to walk around without dyspnea and denies cough, however starting about 4 days ago she started to feel poorly with subjective fever, cough and shortness of breath. She was admitted yesterday following CXR and CT showing pneumonia L>R bases. She also reported an episode of scant hemoptysis, but has not produced much over the past 16 hours of admission. She required O2 and has been weaned to 10L HFNC at this time. She reports history of GERD and previously was on PPI. Has head of bed elevated and since doing this has had less reflux symptoms. Has had aspiration episodes in past with reflux, but no overt pneumonia and none recently. Stays at home all day, doesn't work, no infectious contacts.  perhaps had a viral GI infection past week. Review of Systems A comprehensive review of systems was negative except for that written in the HPI. Patient Active Problem List  
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  
  Esophageal reflux K21.9  Nausea with vomiting R11.2  Other enthesopathy of ankle and tarsus M77.50  
 Other disorders of synovium, tendon, and bursa(727.89) M67.89  
 Acute bronchitis J20.9  Underweight R63.6  Unspecified constipation K59.00  Ingrowing nail L60.0  Irregular menstrual cycle N92.6  Recurrent depression ([address] F33.9  Hypokalemia E87.6  CAP (community acquired pneumonia) J18.9  Hemoptysis R04.2  Hypoxia R09.02  
 Tachycardia R00.0 Prior to Admission Medications Prescriptions Last Dose Informant Patient Reported? Taking? Cholecalciferol, Vitamin D3, (VITAMIN D3) 2,000 unit cap capsule Unknown at Unknown time  No No  
Sig: Take 2,000 Units by mouth two (2) times a day. Indications: VITAMIN D DEFICIENCY  
QUEtiapine (SEROQUEL) 100 mg tablet Unknown at Unknown time  No No  
Sig: Take 2 Tabs by mouth nightly. cyanocobalamin (VITAMIN B12) 1,000 mcg/mL injection 10/22/2018 at Unknown time  No Yes Sig: Needles/syringe-50#Dx b12 deficiency,,,DOSE 1 CC EVERY 2 WEEKS  
levothyroxine (SYNTHROID) 25 mcg tablet Unknown at Unknown time  No No  
Si a day empty stomach  Indications: hypothyroidism  
pantoprazole (PROTONIX) 40 mg tablet Unknown at Unknown time  No No  
Sig: Take 1 Tab by mouth daily. promethazine (PHENERGAN) 25 mg tablet Unknown at Unknown time  No No  
Sig: Take 1 Tab by mouth every six (6) hours as needed for Nausea. sucralfate (CARAFATE) 100 mg/mL suspension Unknown at Unknown time  No No  
Sig: Take 5 mL by mouth four (4) times daily. Facility-Administered Medications: None Past Medical History:  
Diagnosis Date  Anorexia  Anxiety  Depressive disorder, not elsewhere classified 2015  GERD (gastroesophageal reflux disease)  Loss of appetite  Pneumonia 7/20/15  Unspecified vitamin B deficiency 2015  Urinary tract infection, site not specified 2015 Past Surgical History: Procedure Laterality Date  HX COLONOSCOPY  last 2007 Farrel Coad  HX CYST INCISION AND DRAINAGE Right   
 breast  
 HX GI    
 anal sphincterotomy  HX LAP CHOLECYSTECTOMY  2005  HX TUBAL LIGATION  2001 Social History Socioeconomic History  Marital status:  Spouse name: Not on file  Number of children: Not on file  Years of education: Not on file  Highest education level: Not on file Social Needs  Financial resource strain: Not on file  Food insecurity - worry: Not on file  Food insecurity - inability: Not on file  Transportation needs - medical: Not on file  Transportation needs - non-medical: Not on file Occupational History  Not on file Tobacco Use  Smoking status: Current Every Day Smoker Packs/day: 1.00 Years: 17.00 Pack years: 17.00  Smokeless tobacco: Never Used Substance and Sexual Activity  Alcohol use: No  
 Drug use: No  
 Sexual activity: Yes Birth control/protection: Surgical  
Other Topics Concern  Not on file Social History Narrative  Not on file Family History Problem Relation Age of Onset  Cancer Mother   
     breast  
 Breast Cancer Mother  No Known Problems Father Allergies Allergen Reactions  Flagyl [Metronidazole] Nausea and Vomiting  Zyprexa [Olanzapine] Other (comments) Upset stomach Current Facility-Administered Medications Medication Dose Route Frequency  ondansetron (ZOFRAN) injection 4 mg  4 mg IntraVENous Q4H PRN  
 sodium chloride (NS) flush 5-10 mL  5-10 mL IntraVENous Q8H  
 sodium chloride (NS) flush 5-10 mL  5-10 mL IntraVENous PRN  
 azithromycin (ZITHROMAX) 500 mg in 0.9% sodium chloride (MBP/ADV) 250 mL  500 mg IntraVENous Q24H  cefTRIAXone (ROCEPHIN) 1 g in 0.9% sodium chloride (MBP/ADV) 50 mL  1 g IntraVENous Q24H  
 acetaminophen (TYLENOL) tablet 650 mg  650 mg Oral Q4H PRN  
  naloxone (NARCAN) injection 0.4 mg  0.4 mg IntraVENous PRN  
 albuterol (PROVENTIL VENTOLIN) nebulizer solution 2.5 mg  2.5 mg Nebulization Q4H PRN  
 bisacodyl (DULCOLAX) tablet 5 mg  5 mg Oral DAILY PRN  
 guaiFENesin ER (MUCINEX) tablet 600 mg  600 mg Oral Q12H  
 HYDROcodone-homatropine (HYCODAN) 5-1.5 mg/5 mL (5 mL) syrup 5 mL  5 mL Oral Q4H PRN Objective:  
 
Vitals:  
 10/30/18 3588 10/30/18 2178 10/30/18 3219 10/30/18 1107 BP:  99/66 94/59 102/72 Pulse: (!) 112 (!) 109 98 99 Resp:  20 20 Temp:  100 °F (37.8 °C) 98.5 °F (36.9 °C) 97.9 °F (36.6 °C) SpO2:  98% 98% 93% PHYSICAL EXAM  
 
Constitutional:  the patient is well developed and in no acute distress EENMT:  Sclera clear, pupils equal, oral mucosa moist 
Respiratory: rales mostly in bases Cardiovascular:  RRR without M,G,R 
Gastrointestinal: soft and non-tender; with positive bowel sounds. Musculoskeletal: warm without cyanosis. There is no lower leg edema. Skin:  no jaundice or rashes, no wounds Neurologic: no gross neuro deficits Psychiatric:  alert and oriented x 4 CXR:  L>R basilar infiltrates. CT Chest: L>R basilar infiltrates. Recent Labs 10/29/18 
1645 WBC 8.7 HGB 11.6* HCT 33.6*  
 Recent Labs 10/30/18 
2085 10/29/18 
1645  137  
K 3.2* 2.6*  
 99 GLU 97 101* CO2 25 25 BUN 14 13 CREA 0.43* 0.53* MG  --  2.1 CA 6.9* 6.9*  
TROIQ  --  <0.02* ALB  --  2.0*  
TBILI  --  1.3* ALT  --  20 SGOT  --  27 No results for input(s): PH, PCO2, PO2, HCO3 in the last 72 hours. No results for input(s): LCAD, LAC in the last 72 hours. Assessment:  (Medical Decision Making) Hospital Problems  Date Reviewed: 10/29/2018 Codes Class Noted POA Hypokalemia ICD-10-CM: E87.6 ICD-9-CM: 276.8  10/29/2018 Yes * (Principal) CAP (community acquired pneumonia) ICD-10-CM: J18.9 ICD-9-CM: 088  10/29/2018 Yes Hemoptysis ICD-10-CM: R04.2 ICD-9-CM: 786.30  10/29/2018 Yes Hypoxia ICD-10-CM: R09.02 
ICD-9-CM: 799.02  10/29/2018 Yes Tachycardia ICD-10-CM: R00.0 ICD-9-CM: 785.0  10/29/2018 Yes Anorexia nervosa ICD-10-CM: F50.00 ICD-9-CM: 307.1  4/9/2015 Yes Esophageal reflux ICD-10-CM: K21.9 ICD-9-CM: 530.81  4/9/2015 Yes 46 y/o female prior smoker with GERD, anorexia with pneumonia concerning for aspiration given nodular and lower lobe appearance on CT and dilated esophagus on CT. Plan:  (Medical Decision Making) --Would broaden for anaerobic coverage-->ceftriaxone to Unasyn for now (could easily switch to Augmentin later) --Induce sputum for cultures (resp, afb, fungal) --No risk factors for TB 
--check blood cultures --check ARGELIA and Scl-70 (esophageal dysmotility and tight skin?) --when improved consider barium swallow vs GI consult vs GI follow up as outpatient 
--if unable to produce sputum could attempt bronch, though O2 requirement is somewhat limiting currently 
--add PPI for GERD More than 50% of the time documented was spent in face-to-face contact with the patient and in the care of the patient on the floor/unit where the patient is located. Thank you very much for this referral.  We appreciate the opportunity to participate in this patient's care. Will follow along with above stated plan.  
 
Sherry Crespo MD

## 2018-10-30 NOTE — PROGRESS NOTES
Progress Note Patient: Marilou Lucas MRN: [id number]  SSN: [id number] YOB: 1980  Age: 45 y.o. Sex: female Admit Date: 10/29/2018 LOS: 1 day Subjective: No new complaints. Poor appetite. Patient with developmental delay but high functioning per father. She is  and lives with spouse, but parents live near her and supervise her and take care of her finances. Chronic poor eater, but father said good weight gain over past several years. Second episode of pneumonia in past one year per father. Objective:  
 
Vitals:  
 10/29/18 2339 10/30/18 1987 10/30/18 4819 10/30/18 7256 BP: 104/69  99/66 94/59 Pulse: (!) 110 (!) 112 (!) 109 98 Resp: (!) 38  20 20 Temp: 99.4 °F (37.4 °C)  100 °F (37.8 °C) 98.5 °F (36.9 °C) SpO2: 94%  98% 98% Intake and Output: 
Current Shift: No intake/output data recorded. Last three shifts: No intake/output data recorded. Physical Exam:  
 
General:  Alert, cooperative, no distress,father reports cognition on par with pre-teen per psychiatrists. Eyes:  Conjunctivae/corneas clear. PERRL, EOMs intact. Fundi benign Ears:  Normal TMs and external ear canals both ears. Nose: Nares normal. Septum midline. Mucosa normal. No drainage or sinus tenderness. Mouth/Throat: Lips, mucosa, and tongue normal. Teeth and gums normal.  
Neck: Supple, symmetrical, trachea midline, no adenopathy, thyroid: no enlargment/tenderness/nodules, no carotid bruit and no JVD. Back:   Symmetric, no curvature. ROM normal. No CVA tenderness. Lungs:   No gross wheeze. Few scattered rhonchi. Heart:  Regular rate and rhythm, S1, S2 normal, no murmur, click, rub or gallop. Abdomen:   Soft, non-tender. Bowel sounds normal. No masses,  No organomegaly. Extremities: Extremities normal, atraumatic, no cyanosis or edema. Pulses: 2+ and symmetric all extremities. Skin: Skin color, texture, turgor normal. No rashes or lesions Lymph nodes: Cervical, supraclavicular, and axillary nodes normal.  
Neurologic: CNII-XII intact. Normal strength, sensation and reflexes throughout. Lab/Data Review: All lab results for the last 24 hours reviewed. Recent Results (from the past 24 hour(s)) AMB POC HEMOGLOBIN (HGB) Collection Time: 10/29/18  3:03 PM  
Result Value Ref Range Hemoglobin (POC) 11.2 EKG, 12 LEAD, INITIAL Collection Time: 10/29/18  4:39 PM  
Result Value Ref Range Ventricular Rate 121 BPM  
 Atrial Rate 121 BPM  
 P-R Interval 130 ms QRS Duration 68 ms Q-T Interval 316 ms  
 QTC Calculation (Bezet) 448 ms Calculated P Axis 54 degrees Calculated R Axis 76 degrees Calculated T Axis -11 degrees Diagnosis Sinus tachycardia Non-specific ST-t wave changes Abnormal ECG No previous ECGs available Confirmed by ST ENA STOLL MD (), EDI JUAREZ ([id number]) on 10/29/2018 10:34:11 PM 
  
CBC WITH AUTOMATED DIFF Collection Time: 10/29/18  4:45 PM  
Result Value Ref Range WBC 8.7 4.3 - 11.1 K/uL  
 RBC 3.87 (L) 4.05 - 5.2 M/uL  
 HGB 11.6 (L) 11.7 - 15.4 g/dL HCT 33.6 (L) 35.8 - 46.3 % MCV 86.8 79.6 - 97.8 FL  
 MCH 30.0 26.1 - 32.9 PG  
 MCHC 34.5 31.4 - 35.0 g/dL  
 RDW 12.5 % PLATELET 539 710 - 518 K/uL MPV 10.4 9.4 - 12.3 FL ABSOLUTE NRBC 0.02 0.0 - 0.2 K/uL NEUTROPHILS 38 (L) 47 - 75 % BAND NEUTROPHILS 25 (H) 0 - 10 % LYMPHOCYTES 26 16 - 44 % MONOCYTES 8 3 - 9 % METAMYELOCYTES 1 % MYELOCYTES 2 %  
 ABS. NEUTROPHILS 5.7 1.7 - 8.2 K/UL  
 ABS. LYMPHOCYTES 2.3 0.5 - 4.6 K/UL  
 ABS. MONOCYTES 0.7 0.1 - 1.3 K/UL  
 WBC COMMENTS MODERATE PLATELET COMMENTS ADEQUATE    
 DF MANUAL METABOLIC PANEL, COMPREHENSIVE Collection Time: 10/29/18  4:45 PM  
Result Value Ref Range Sodium 137 136 - 145 mmol/L Potassium 2.6 (LL) 3.5 - 5.1 mmol/L Chloride 99 98 - 107 mmol/L  
 CO2 25 21 - 32 mmol/L Anion gap 13 7 - 16 mmol/L Glucose 101 (H) 65 - 100 mg/dL BUN 13 6 - 23 MG/DL Creatinine 0.53 (L) 0.6 - 1.0 MG/DL  
 GFR est AA >60 >60 ml/min/1.73m2 GFR est non-AA >60 >60 ml/min/1.73m2 Calcium 6.9 (L) 8.3 - 10.4 MG/DL Bilirubin, total 1.3 (H) 0.2 - 1.1 MG/DL  
 ALT (SGPT) 20 12 - 65 U/L  
 AST (SGOT) 27 15 - 37 U/L Alk. phosphatase 71 50 - 136 U/L Protein, total 6.5 6.3 - 8.2 g/dL Albumin 2.0 (L) 3.5 - 5.0 g/dL Globulin 4.5 (H) 2.3 - 3.5 g/dL A-G Ratio 0.4 (L) 1.2 - 3.5    
TROPONIN I Collection Time: 10/29/18  4:45 PM  
Result Value Ref Range Troponin-I, Qt. <0.02 (L) 0.02 - 0.05 NG/ML  
MAGNESIUM Collection Time: 10/29/18  4:45 PM  
Result Value Ref Range Magnesium 2.1 1.8 - 2.4 mg/dL POC LACTIC ACID Collection Time: 10/29/18  4:49 PM  
Result Value Ref Range Lactic Acid (POC) 1.87 0.5 - 1.9 mmol/L  
POC VENOUS BLOOD GAS Collection Time: 10/29/18  7:13 PM  
Result Value Ref Range Device: ROOM AIR    
 pH, venous (POC) 7.485 (H) 7.32 - 7.42    
 pCO2, venous (POC) 35.3 (L) 41 - 51 MMHG  
 pO2, venous (POC) 28 (LL) mmHg HCO3, venous (POC) 26.6 23 - 28 MMOL/L  
 sO2, venous (POC) 58 (L) 65 - 88 % Allens test (POC) NOT APPLICABLE Site OTHER Patient temp. 98.6 Specimen type (POC) VENOUS BLOOD Performed by Julianna   
 CO2, POC 28 MMOL/L  
URINE MICROSCOPIC Collection Time: 10/29/18  7:27 PM  
Result Value Ref Range WBC 5-10 0 /hpf  
 RBC 3-5 0 /hpf Epithelial cells 5-10 0 /hpf Bacteria TRACE 0 /hpf Casts 0 0 /lpf Crystals, urine 0 0 /LPF Mucus 2+ (H) 0 /lpf Other observations RESULTS VERIFIED MANUALLY Isma Austin Current Facility-Administered Medications:  
  ondansetron (ZOFRAN) injection 4 mg, 4 mg, IntraVENous, Q4H PRN, Sara Martel MD, 4 mg at 10/30/18 5850   sodium chloride (NS) flush 5-10 mL, 5-10 mL, IntraVENous, Q8H, Sara Martel MD, 10 mL at 10/30/18 3787   sodium chloride (NS) flush 5-10 mL, 5-10 mL, IntraVENous, PRN, Sara Martel MD 
   azithromycin (ZITHROMAX) 500 mg in 0.9% sodium chloride (MBP/ADV) 250 mL, 500 mg, IntraVENous, Q24H, Dwayne Palmer MD 
  cefTRIAXone (ROCEPHIN) 1 g in 0.9% sodium chloride (MBP/ADV) 50 mL, 1 g, IntraVENous, Q24H, Dwayne Palmer MD 
  acetaminophen (TYLENOL) tablet 650 mg, 650 mg, Oral, Q4H PRN, Dwayne Palmer MD 
  [facility]) injection 0.4 mg, 0.4 mg, IntraVENous, PRN, Dwayne Palmer MD 
  albuterol (PROVENTIL VENTOLIN) nebulizer solution 2.5 mg, 2.5 mg, Nebulization, Q4H PRN, Dwayne Palmer MD 
  bisacodyl (DULCOLAX) tablet 5 mg, 5 mg, Oral, DAILY PRN, Dwayne Palmer MD 
  guaiFENesin ER [facility]) tablet 600 mg, 600 mg, Oral, Q12H, Dwayne Palmer MD 
  HYDROcodone-homatropine (HYCODAN) 5-1.5 mg/5 mL (5 mL) syrup 5 mL, 5 mL, Oral, Q4H PRN, Dwayne Palmer MD, 5 mL at 10/30/18 0136 Assessment:  
 
Principal Problem: 
  CAP (community acquired pneumonia) (10/29/2018) Active Problems: 
  Anorexia nervosa (4/9/2015) Esophageal reflux (4/9/2015) Hypokalemia (10/29/2018) Hemoptysis (10/29/2018) Hypoxia (10/29/2018) Tachycardia (10/29/2018) Plan:  
 
Continue current antibiotics. Nutrition consult. She quit smoking on admit and does not want a patch. Dietary nutrition consult if not done. --wean oxygen 
--sepsis markers improved heart rate resp rate and afebrile , follow up cbc. Signed By: Zeke De Anda DO October 30, 2018

## 2018-10-30 NOTE — PROGRESS NOTES
Patient resting in bed. Visitor at bedside. Obtained sputum sample and sent to lab. Meds crushed this shift as requested per patient. Oxygen at 6 lpm via high flow NC. O2 sat 96%. No complaints at this time. No distress noted. Call light within reach.

## 2018-10-30 NOTE — PROGRESS NOTES
Spiritual Care Visit, initial visit. Visited with patient at bedside. Prayed for patient's healing and health. Patient's affect: she seemed sad, Perhaps even somewhat lethargic. Visit by Karen Watters, Staff .  M.Matt., Sabrina.B., B.A.

## 2018-10-30 NOTE — PROGRESS NOTES
Received report from [facility]. Patient awake in bed. Respirations present. No signs of distress at this time. Bed low and locked. Call light within reach. Will continue to monitor.

## 2018-10-30 NOTE — ED NOTES
rn was called from ct stating pt's iv wasn't working. RN went to ct, and tightening the hub between iv hub and extension tubing. Iv working and flushed. Pt still at ct finishing scan

## 2018-10-30 NOTE — ED NOTES
TRANSFER - OUT REPORT: 
 
Verbal report given to ARIEL SHABAZZ RN on [employer]  being transferred to [phone] for routine progression of care Report consisted of patients Situation, Background, Assessment and  
Recommendations(SBAR). Information from the following report(s) SBAR, ED Summary, [facility] and Recent Results was reviewed with the receiving nurse. Lines:  
Peripheral IV Left Antecubital (Active) Site Assessment Clean, dry, & intact 10/29/2018  7:50 PM  
Phlebitis Assessment 0 10/29/2018  7:50 PM  
Infiltration Assessment 0 10/29/2018  7:50 PM  
Dressing Status Clean, dry, & intact 10/29/2018  7:50 PM  
Dressing Type Transparent 10/29/2018  7:50 PM  
   
Peripheral IV 10/29/18 Right Forearm (Active) Site Assessment Clean, dry, & intact 10/29/2018  9:53 PM  
Phlebitis Assessment 0 10/29/2018  9:53 PM  
Infiltration Assessment 0 10/29/2018  9:53 PM  
Dressing Status Clean, dry, & intact 10/29/2018  9:53 PM  
Dressing Type Transparent 10/29/2018  9:53 PM  
  
 
Opportunity for questions and clarification was provided. Patient transported with: 
 O2 @ 15L liters Tech

## 2018-10-30 NOTE — PROGRESS NOTES
Patient requested medication for nausea. Patient received Zofran 4 mg IV @ 0603. Will continue to monitor.

## 2018-10-30 NOTE — CONSULTS
CONSULT NOTE    Unice Frankel    10/30/2018    Date of Admission:  10/29/2018    The patient's chart is reviewed and the patient is discussed with the staff. Subjective:     Patient is a 45 y.o.  female seen and evaluated at the request of Dr. Ricardo Rivera. She has h/o smoking, though quit 1 year ago and was vaping for the past year. H/o pneumonia in 2015 which she doesn't remember the specifics for. She denies h/o asthma or COPD. She normally is able to walk around without dyspnea and denies cough, however starting about 4 days ago she started to feel poorly with subjective fever, cough and shortness of breath. She was admitted yesterday following CXR and CT showing pneumonia L>R bases. She also reported an episode of scant hemoptysis, but has not produced much over the past 16 hours of admission. She required O2 and has been weaned to 10L HFNC at this time. She reports history of GERD and previously was on PPI. Has head of bed elevated and since doing this has had less reflux symptoms. Has had aspiration episodes in past with reflux, but no overt pneumonia and none recently. Stays at home all day, doesn't work, no infectious contacts.  perhaps had a viral GI infection past week. Review of Systems  A comprehensive review of systems was negative except for that written in the HPI.     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    Unspecified vitamin B deficiency E53.9    Anorexia nervosa F50.00    Unspecified disorder of menstruation and other abnormal bleeding from female genital tract N92.6, N93.9    Depressive disorder, not elsewhere classified F32.9    Dysmenorrhea N94.6    Urinary tract infection, site not specified N39.0    Solitary cyst of breast N60.09    Esophageal reflux K21.9    Nausea with vomiting R11.2    Other enthesopathy of ankle and tarsus M77.50    Other disorders of synovium, tendon, and bursa(727.89) M67.89    Acute bronchitis J20.9    Underweight R63.6    Unspecified constipation K59.00    Ingrowing nail L60.0    Irregular menstrual cycle N92.6    Recurrent depression (HCC) F33.9    Hypokalemia E87.6    CAP (community acquired pneumonia) J18.9    Hemoptysis R04.2    Hypoxia R09.02    Tachycardia R00.0     Prior to Admission Medications   Prescriptions Last Dose Informant Patient Reported? Taking? Cholecalciferol, Vitamin D3, (VITAMIN D3) 2,000 unit cap capsule Unknown at Unknown time  No No   Sig: Take 2,000 Units by mouth two (2) times a day. Indications: VITAMIN D DEFICIENCY   QUEtiapine (SEROQUEL) 100 mg tablet Unknown at Unknown time  No No   Sig: Take 2 Tabs by mouth nightly. cyanocobalamin (VITAMIN B12) 1,000 mcg/mL injection 10/22/2018 at Unknown time  No Yes   Sig: Needles/syringe-50#Dx b12 deficiency,,,DOSE 1 CC EVERY 2 WEEKS   levothyroxine (SYNTHROID) 25 mcg tablet Unknown at Unknown time  No No   Si a day empty stomach  Indications: hypothyroidism   pantoprazole (PROTONIX) 40 mg tablet Unknown at Unknown time  No No   Sig: Take 1 Tab by mouth daily. promethazine (PHENERGAN) 25 mg tablet Unknown at Unknown time  No No   Sig: Take 1 Tab by mouth every six (6) hours as needed for Nausea. sucralfate (CARAFATE) 100 mg/mL suspension Unknown at Unknown time  No No   Sig: Take 5 mL by mouth four (4) times daily.       Facility-Administered Medications: None     Past Medical History:   Diagnosis Date    Anorexia     Anxiety     Depressive disorder, not elsewhere classified 2015    GERD (gastroesophageal reflux disease)     Loss of appetite     Pneumonia     7/20/15    Unspecified vitamin B deficiency 2015    Urinary tract infection, site not specified 2015     Past Surgical History:   Procedure Laterality Date    HX COLONOSCOPY  last 2007    [facility]    HX CYST INCISION AND DRAINAGE Right     breast    HX GI      anal sphincterotomy    HX LAP CHOLECYSTECTOMY  2005    HX TUBAL LIGATION  2001     Social History     Socioeconomic History    Marital status:      Spouse name: Not on file    Number of children: Not on file    Years of education: Not on file    Highest education level: Not on file   Social Needs    Financial resource strain: Not on file    Food insecurity - worry: Not on file    Food insecurity - inability: Not on file    Transportation needs - medical: Not on file   SplitSecnd needs - non-medical: Not on file   Occupational History    Not on file   Tobacco Use    Smoking status: Current Every Day Smoker     Packs/day: 1.00     Years: 17.00     Pack years: 17.00    Smokeless tobacco: Never Used   Substance and Sexual Activity    Alcohol use: No    Drug use: No    Sexual activity: Yes     Birth control/protection: Surgical   Other Topics Concern    Not on file   Social History Narrative    Not on file     Family History   Problem Relation Age of Onset    Cancer Mother         breast    Breast Cancer Mother     No Known Problems Father      Allergies   Allergen Reactions    Flagyl [Metronidazole] Nausea and Vomiting    Zyprexa [Olanzapine] Other (comments)     Upset stomach     Current Facility-Administered Medications   Medication Dose Route Frequency    ondansetron (ZOFRAN) injection 4 mg  4 mg IntraVENous Q4H PRN    sodium chloride (NS) flush 5-10 mL  5-10 mL IntraVENous Q8H    sodium chloride (NS) flush 5-10 mL  5-10 mL IntraVENous PRN    azithromycin (ZITHROMAX) 500 mg in 0.9% sodium chloride (MBP/ADV) 250 mL  500 mg IntraVENous Q24H    cefTRIAXone (ROCEPHIN) 1 g in 0.9% sodium chloride (MBP/ADV) 50 mL  1 g IntraVENous Q24H    acetaminophen (TYLENOL) tablet 650 mg  650 mg Oral Q4H PRN    naloxone (NARCAN) injection 0.4 mg  0.4 mg IntraVENous PRN    albuterol (PROVENTIL VENTOLIN) nebulizer solution 2.5 mg  2.5 mg Nebulization Q4H PRN    bisacodyl (DULCOLAX) tablet 5 mg  5 mg Oral DAILY PRN    guaiFENesin ER (MUCINEX) tablet 600 mg  600 mg Oral Q12H    HYDROcodone-homatropine (HYCODAN) 5-1.5 mg/5 mL (5 mL) syrup 5 mL  5 mL Oral Q4H PRN         Objective:     Vitals:    10/30/18 0055 10/30/18 0326 10/30/18 0709 10/30/18 1107   BP:  99/66 94/59 102/72   Pulse: (!) 112 (!) 109 98 99   Resp:  20 20    Temp:  100 °F (37.8 °C) 98.5 °F (36.9 °C) 97.9 °F (36.6 °C)   SpO2:  98% 98% 93%       PHYSICAL EXAM     Constitutional:  the patient is well developed and in no acute distress  EENMT:  Sclera clear, pupils equal, oral mucosa moist  Respiratory: rales mostly in bases  Cardiovascular:  RRR without M,G,R  Gastrointestinal: soft and non-tender; with positive bowel sounds. Musculoskeletal: warm without cyanosis. There is no lower leg edema. Skin:  no jaundice or rashes, no wounds   Neurologic: no gross neuro deficits     Psychiatric:  alert and oriented x 4    CXR:  L>R basilar infiltrates. CT Chest: L>R basilar infiltrates. Recent Labs     10/29/18  1645   WBC 8.7   HGB 11.6*   HCT 33.6*        Recent Labs     10/30/18  0638 10/29/18  1645    137   K 3.2* 2.6*    99   GLU 97 101*   CO2 25 25   BUN 14 13   CREA 0.43* 0.53*   MG  --  2.1   CA 6.9* 6.9*   TROIQ  --  <0.02*   ALB  --  2.0*   TBILI  --  1.3*   ALT  --  20   SGOT  --  27     No results for input(s): PH, PCO2, PO2, HCO3 in the last 72 hours. No results for input(s): LCAD, LAC in the last 72 hours.     Assessment:  (Medical Decision Making)     Hospital Problems  Date Reviewed: 10/29/2018          Codes Class Noted POA    Hypokalemia ICD-10-CM: E87.6  ICD-9-CM: 276.8  10/29/2018 Yes        * (Principal) CAP (community acquired pneumonia) ICD-10-CM: J18.9  ICD-9-CM: 078  10/29/2018 Yes        Hemoptysis ICD-10-CM: R04.2  ICD-9-CM: 786.30  10/29/2018 Yes        Hypoxia ICD-10-CM: R09.02  ICD-9-CM: 799.02  10/29/2018 Yes        Tachycardia ICD-10-CM: R00.0  ICD-9-CM: 785.0  10/29/2018 Yes        Anorexia nervosa ICD-10-CM: F50.00  ICD-9-CM: 307.1  4/9/2015 Yes        Esophageal reflux ICD-10-CM: K21.9  ICD-9-CM: 530.81  4/9/2015 Yes            46 y/o female prior smoker with GERD, anorexia with pneumonia concerning for aspiration given nodular and lower lobe appearance on CT and dilated esophagus on CT. Plan:  (Medical Decision Making)     --Would broaden for anaerobic coverage-->ceftriaxone to Unasyn for now (could easily switch to Augmentin later)  --Induce sputum for cultures (resp, afb, fungal)  --No risk factors for TB  --check blood cultures  --check ARGELIA and Scl-70 (esophageal dysmotility and tight skin?)  --when improved consider barium swallow vs GI consult vs GI follow up as outpatient  --if unable to produce sputum could attempt bronch, though O2 requirement is somewhat limiting currently  --add PPI for GERD    More than 50% of the time documented was spent in face-to-face contact with the patient and in the care of the patient on the floor/unit where the patient is located. Thank you very much for this referral.  We appreciate the opportunity to participate in this patient's care. Will follow along with above stated plan.     Gaviota Pa MD

## 2018-10-30 NOTE — PROGRESS NOTES
Received patient from ER. Patient awake in bed. Respirations present. Patient has increased respirations. S1 and S2 noted. Radial and pedal pulses palpable bilaterally. Bowel sounds hypoactive. Patient states \"hasn't had BM in days\". Dual skin assessment completed with Joanie Guzmán RN. Scrap noted on right elbow. No edema noted. No pressure injuries noted. Mother present at bedside. Bed low and locked. Call light within reach. Will continue to monitor.

## 2018-10-30 NOTE — PROGRESS NOTES
Patient admitted overnight with PNA. She has a developmental delay, but is high functioning. Patient is  and lives with her  in their own home. Her parents live nearby and give oversight to her healthcare and finances. Patient is independent at baseline. Case Management will follow for discharge planning needs. Care Management Interventions PCP Verified by CM: Yes Transition of Care Consult (CM Consult): Discharge Planning Discharge Durable Medical Equipment: No 
Physical Therapy Consult: No 
Occupational Therapy Consult: No 
Speech Therapy Consult: No 
Current Support Network: Own Home, Lives with Spouse, Family Lives [place] Confirm Follow Up Transport: Family Plan discussed with Pt/Family/Caregiver: Yes Freedom of Choice Offered: Yes Discharge Location Discharge Placement: Home

## 2018-10-31 PROBLEM — E44.0 PROTEIN-CALORIE MALNUTRITION, MODERATE (HCC): Status: ACTIVE | Noted: 2018-10-31

## 2018-10-31 LAB
ALBUMIN SERPL-MCNC: 1.5 G/DL (ref 3.5–5)
ALBUMIN/GLOB SERPL: 0.4 {RATIO} (ref 1.2–3.5)
ALP SERPL-CCNC: 79 U/L (ref 50–136)
ALT SERPL-CCNC: 23 U/L (ref 12–65)
ANA SER QL: NEGATIVE
ANION GAP SERPL CALC-SCNC: 12 MMOL/L (ref 7–16)
AST SERPL-CCNC: 50 U/L (ref 15–37)
BILIRUB SERPL-MCNC: 0.5 MG/DL (ref 0.2–1.1)
BLASTS NFR BLD MANUAL: 2 %
BUN SERPL-MCNC: 10 MG/DL (ref 6–23)
CALCIUM SERPL-MCNC: 6.7 MG/DL (ref 8.3–10.4)
CHLORIDE SERPL-SCNC: 109 MMOL/L (ref 98–107)
CO2 SERPL-SCNC: 22 MMOL/L (ref 21–32)
CREAT SERPL-MCNC: 0.3 MG/DL (ref 0.6–1)
DIFFERENTIAL METHOD BLD: ABNORMAL
ENA SCL70 AB SER-ACNC: <0.2 AI (ref 0–0.9)
ERYTHROCYTE [DISTWIDTH] IN BLOOD BY AUTOMATED COUNT: 13.2 %
GLOBULIN SER CALC-MCNC: 3.8 G/DL (ref 2.3–3.5)
GLUCOSE SERPL-MCNC: 86 MG/DL (ref 65–100)
HCT VFR BLD AUTO: 28.9 % (ref 35.8–46.3)
HGB BLD-MCNC: 9.9 G/DL (ref 11.7–15.4)
LYMPHOCYTES # BLD: 0.8 K/UL (ref 0.5–4.6)
LYMPHOCYTES NFR BLD MANUAL: 5 % (ref 16–44)
MAGNESIUM SERPL-MCNC: 2.3 MG/DL (ref 1.8–2.4)
MCH RBC QN AUTO: 30.7 PG (ref 26.1–32.9)
MCHC RBC AUTO-ENTMCNC: 34.3 G/DL (ref 31.4–35)
MCV RBC AUTO: 89.8 FL (ref 79.6–97.8)
METAMYELOCYTES NFR BLD MANUAL: 5 %
MONOCYTES # BLD: 0.2 K/UL (ref 0.1–1.3)
MONOCYTES NFR BLD MANUAL: 1 % (ref 3–9)
MYELOCYTES NFR BLD MANUAL: 1 %
NEUTS BAND NFR BLD MANUAL: 3 % (ref 0–10)
NEUTS SEG # BLD: 15.1 K/UL (ref 1.7–8.2)
NEUTS SEG NFR BLD MANUAL: 82 % (ref 47–75)
NRBC # BLD: 0 K/UL (ref 0–0.2)
PLATELET # BLD AUTO: 332 K/UL (ref 150–450)
PLATELET COMMENTS,PCOM: ABNORMAL
PMV BLD AUTO: 10.2 FL (ref 9.4–12.3)
POTASSIUM SERPL-SCNC: 3 MMOL/L (ref 3.5–5.1)
PROMYELOCYTES NFR BLD MANUAL: 1 %
PROT SERPL-MCNC: 5.3 G/DL (ref 6.3–8.2)
RBC # BLD AUTO: 3.22 M/UL (ref 4.05–5.2)
RBC MORPH BLD: ABNORMAL
SODIUM SERPL-SCNC: 143 MMOL/L (ref 136–145)
WBC # BLD AUTO: 16.4 K/UL (ref 4.3–11.1)
WBC MORPH BLD: ABNORMAL

## 2018-10-31 PROCEDURE — 83735 ASSAY OF MAGNESIUM: CPT

## 2018-10-31 PROCEDURE — 74011000258 HC RX REV CODE- 258: Performed by: INTERNAL MEDICINE

## 2018-10-31 PROCEDURE — 77010033678 HC OXYGEN DAILY

## 2018-10-31 PROCEDURE — 74011250637 HC RX REV CODE- 250/637: Performed by: INTERNAL MEDICINE

## 2018-10-31 PROCEDURE — 65660000000 HC RM CCU STEPDOWN

## 2018-10-31 PROCEDURE — 74011250636 HC RX REV CODE- 250/636: Performed by: HOSPITALIST

## 2018-10-31 PROCEDURE — 94640 AIRWAY INHALATION TREATMENT: CPT

## 2018-10-31 PROCEDURE — 74011250636 HC RX REV CODE- 250/636: Performed by: INTERNAL MEDICINE

## 2018-10-31 PROCEDURE — 77030020263 HC SOL INJ SOD CL0.9% LFCR 1000ML

## 2018-10-31 PROCEDURE — 74011000250 HC RX REV CODE- 250: Performed by: HOSPITALIST

## 2018-10-31 PROCEDURE — 36415 COLL VENOUS BLD VENIPUNCTURE: CPT

## 2018-10-31 PROCEDURE — 94760 N-INVAS EAR/PLS OXIMETRY 1: CPT

## 2018-10-31 PROCEDURE — 85025 COMPLETE CBC W/AUTO DIFF WBC: CPT

## 2018-10-31 PROCEDURE — 74011250636 HC RX REV CODE- 250/636: Performed by: FAMILY MEDICINE

## 2018-10-31 PROCEDURE — 99232 SBSQ HOSP IP/OBS MODERATE 35: CPT | Performed by: INTERNAL MEDICINE

## 2018-10-31 PROCEDURE — 74011250637 HC RX REV CODE- 250/637: Performed by: HOSPITALIST

## 2018-10-31 PROCEDURE — 80053 COMPREHEN METABOLIC PANEL: CPT

## 2018-10-31 RX ORDER — AZITHROMYCIN 250 MG/1
500 TABLET, FILM COATED ORAL EVERY 24 HOURS
Status: COMPLETED | OUTPATIENT
Start: 2018-10-31 | End: 2018-11-06

## 2018-10-31 RX ORDER — POTASSIUM CHLORIDE 20 MEQ/1
40 TABLET, EXTENDED RELEASE ORAL ONCE
Status: DISPENSED | OUTPATIENT
Start: 2018-10-31 | End: 2018-11-01

## 2018-10-31 RX ADMIN — SUCRALFATE 0.5 G: 1 SUSPENSION ORAL at 22:15

## 2018-10-31 RX ADMIN — AMPICILLIN SODIUM AND SULBACTAM SODIUM 3 G: 2; 1 INJECTION, POWDER, FOR SOLUTION INTRAMUSCULAR; INTRAVENOUS at 12:05

## 2018-10-31 RX ADMIN — PROMETHAZINE HYDROCHLORIDE 25 MG: 25 TABLET ORAL at 17:48

## 2018-10-31 RX ADMIN — SODIUM CHLORIDE 1000 ML: 900 INJECTION, SOLUTION INTRAVENOUS at 01:05

## 2018-10-31 RX ADMIN — SUCRALFATE 0.5 G: 1 SUSPENSION ORAL at 11:57

## 2018-10-31 RX ADMIN — HYDROCODONE BITARTRATE AND HOMATROPINE METHYLBROMIDE 5 ML: 5; 1.5 SOLUTION ORAL at 05:59

## 2018-10-31 RX ADMIN — ONDANSETRON 4 MG: 2 INJECTION INTRAMUSCULAR; INTRAVENOUS at 16:13

## 2018-10-31 RX ADMIN — POTASSIUM CHLORIDE 40 MEQ: 20 TABLET, EXTENDED RELEASE ORAL at 08:37

## 2018-10-31 RX ADMIN — SODIUM CHLORIDE 50 ML/HR: 900 INJECTION, SOLUTION INTRAVENOUS at 14:38

## 2018-10-31 RX ADMIN — Medication 10 ML: at 13:37

## 2018-10-31 RX ADMIN — Medication 10 ML: at 05:59

## 2018-10-31 RX ADMIN — AMPICILLIN SODIUM AND SULBACTAM SODIUM 3 G: 2; 1 INJECTION, POWDER, FOR SOLUTION INTRAMUSCULAR; INTRAVENOUS at 06:31

## 2018-10-31 RX ADMIN — HYDROCODONE BITARTRATE AND HOMATROPINE METHYLBROMIDE 5 ML: 5; 1.5 SOLUTION ORAL at 22:15

## 2018-10-31 RX ADMIN — Medication 10 ML: at 22:15

## 2018-10-31 RX ADMIN — GUAIFENESIN 600 MG: 600 TABLET, EXTENDED RELEASE ORAL at 08:36

## 2018-10-31 RX ADMIN — VITAMIN D, TAB 1000IU (100/BT) 2000 UNITS: 25 TAB at 08:37

## 2018-10-31 RX ADMIN — HYDROCODONE BITARTRATE AND HOMATROPINE METHYLBROMIDE 5 ML: 5; 1.5 SOLUTION ORAL at 12:26

## 2018-10-31 RX ADMIN — ACETAMINOPHEN 650 MG: 325 TABLET, FILM COATED ORAL at 08:43

## 2018-10-31 RX ADMIN — ONDANSETRON 4 MG: 2 INJECTION INTRAMUSCULAR; INTRAVENOUS at 08:43

## 2018-10-31 RX ADMIN — SUCRALFATE 0.5 G: 1 SUSPENSION ORAL at 05:59

## 2018-10-31 RX ADMIN — AMPICILLIN SODIUM AND SULBACTAM SODIUM 3 G: 2; 1 INJECTION, POWDER, FOR SOLUTION INTRAMUSCULAR; INTRAVENOUS at 19:39

## 2018-10-31 RX ADMIN — AMPICILLIN SODIUM AND SULBACTAM SODIUM 3 G: 2; 1 INJECTION, POWDER, FOR SOLUTION INTRAMUSCULAR; INTRAVENOUS at 01:12

## 2018-10-31 RX ADMIN — AZITHROMYCIN 500 MG: 250 TABLET, FILM COATED ORAL at 19:39

## 2018-10-31 RX ADMIN — ALBUTEROL SULFATE 2.5 MG: 2.5 SOLUTION RESPIRATORY (INHALATION) at 17:05

## 2018-10-31 NOTE — PROGRESS NOTES
Patient status not progressing. She is independent at baseline. Remains on O2 at this time. Case Management will continue to follow. Care Management Interventions PCP Verified by CM: Yes Transition of Care Consult (CM Consult): Discharge Planning Discharge Durable Medical Equipment: No 
Physical Therapy Consult: No 
Occupational Therapy Consult: No 
Speech Therapy Consult: No 
Current Support Network: Own Home, Lives with Spouse, Family Lives [place] Confirm Follow Up Transport: Family Plan discussed with Pt/Family/Caregiver: Yes Freedom of Choice Offered: Yes Discharge Location Discharge Placement: Home

## 2018-10-31 NOTE — PROGRESS NOTES
Patient BP 82/46. Patient is lethargic. AxO to person and place. Change in LOC noted. On-call physician, Dr. Lubna Jarrett, notified. Orders were received for 1 L NS bolus and to continue with  ml/hr after bolus NS. Will continue to monitor patient.

## 2018-10-31 NOTE — PROGRESS NOTES
Problem: Nutrition Deficit Goal: *Optimize nutritional status Nutrition Reason for assessment: Consult: Poor oral intake, lean body mass and hx of eating disorder. Recurrent pneumonia (Dr. Sofia Juarez) Assessment:  
Diet order(s): RegularFood/Nutrition Patient History:  Patient presents with one acute nutrition risk factor identified by malnutrition screening tool upon admission for poor appetite. The patient is noted to have a h/o mild cognitive impairment and GERD. She states that she is nauseous with any food that she tries to eat. Dad at beside and reports that she has not really consumed much of anything over the past week. Dad reports that home diet consists of Pizza, fast food and take out. He reports that she has been gaining weight over the past few years. Reports that at one point she weighed only about 60 pounds. He reports trying supplements like boost and Ensure but patient does not seem to like these. She does like milkshakes and states she would drink a chocolate milkshake. Apparently her mom brought her a chocolate milkshake from [employer] yesterday and she consumed about 50% of it. Weight history in the EMR cannot be verified as accurate due to unknown weight source (pt stated vs estimated vs measured). Weight Loss Metrics 10/29/2018 10/24/2018 7/3/2018 5/3/2018 Today's Wt 108 lb 108 lb 108 lb 6.4 oz 105 lb BMI 19.75 kg/m2 19.75 kg/m2 19.83 kg/m2 19.2 kg/m2 Weight Loss Metrics 4/26/2018 3/21/2018 1/2/2018 10/3/2017 Today's Wt 104 lb 6.4 oz 102 lb 99 lb 97 lb 12.8 oz BMI 19.1 kg/m2 18.66 kg/m2 18.11 kg/m2 17.89 kg/m2 According to the EMR the patient has gained ~11# over the past year. Anthropometrics: Height: 5' 2\", Weight: 49 kg (108 lb), BMI: 19.75,  BMI class of normal weight. Macronutrient needs: EER:  3679-6063 kcal /day (30-35 kcal/kg listed BW) EPR:  49-59 grams protein/day (1-1.2 grams/kg listed BW) Intake/Comparative Standards: Average intake for past 3 recorded meal(s): 0%. This potentially meets ~0% of kcal and ~0% of protein needs Nutrition Diagnosis: Inadequate oral intake related to decreased appetite as evidenced by patient not eating meals since admission and complains of nausea when food is placed in front of her. Intervention: 
Meals and snacks: Continue current diet. Encouraged family to bring in fast food for better consumption of meals Nutrition Supplement Therapy: Discontinue Ensure Enlive; Add home-made CIB chocolate milkshake Nutrition Discharge Plan: Too soon to determine Dolph [employer]s Mahala Speaker, [address] [place] [address], [phone]

## 2018-10-31 NOTE — PROGRESS NOTES
Patient BP increased to 92/62 with 1 liter NS bolus. NS currently @ 100 ml/hr per MD orders. AxO x3. Patient responds to voice. Patient states she \"hasn't slept in days and is finally getting some rest\". No signs of distress at this time. Bed low and locked. Will continue to monitor.

## 2018-10-31 NOTE — PROGRESS NOTES
Richard Manley Admission Date: 10/29/2018 Daily Progress Note: 10/31/2018 The patient's chart is reviewed and the patient is discussed with the staff. 
 
45 y.o. CF evaluated at the request of Dr. Checo Montenegro. Has mental delay with reported 2nd grade education equivalent, h/o smoking-quit 1 year ago and has been vaping for the past year. H/o pneumonia in 2015, denies h/o asthma or COPD. About 4 days ago began to feel poorly with subjective fever, cough and shortness of breath. Was admitted and CXR and CT showing pneumonia L>R bases. She reported an episode of scant hemoptysis but not produced much over the past 16 hours. She required O2 and has been weaned to 10L HFNC at this time. She reports history of GERD and previously was on PPI. Has HOB elevated and since doing has less reflux symptoms. Has had aspiration episodes in past with reflux, but no overt pneumonia and none recently. Stays at home all day, doesn't work, no infectious contacts.  perhaps had a viral GI infection past week. Subjective:  
 
Lying in bed and states she does not feel good. Complains of nausea and but has not vomited but states she has vomiting \"alot at home\". Refused breakfast tray. Has productive cough at times and remains on NC. Complains of weakness and needs assistance to [facility]. Current Facility-Administered Medications Medication Dose Route Frequency  levothyroxine (SYNTHROID) tablet 25 mcg  25 mcg Oral 6am  
 cholecalciferol (VITAMIN D3) tablet 2,000 Units  2,000 Units Oral DAILY  QUEtiapine (SEROquel) tablet 200 mg  200 mg Oral QHS  sucralfate (CARAFATE) 100 mg/mL oral suspension 0.5 g  0.5 g Oral AC&HS  promethazine (PHENERGAN) tablet 25 mg  25 mg Oral Q6H PRN  
 ondansetron (ZOFRAN) injection 4 mg  4 mg IntraVENous Q4H PRN  pantoprazole (PROTONIX) tablet 40 mg  40 mg Oral ACB  ampicillin-sulbactam (UNASYN) 3 g in 0.9% sodium chloride (MBP/ADV) 100 mL  3 g IntraVENous Q6H  
 potassium chloride (K-DUR, KLOR-CON) SR tablet 40 mEq  40 mEq Oral BID  
 0.9% sodium chloride infusion  100 mL/hr IntraVENous CONTINUOUS  
 sodium chloride (NS) flush 5-10 mL  5-10 mL IntraVENous Q8H  
 sodium chloride (NS) flush 5-10 mL  5-10 mL IntraVENous PRN  
 azithromycin (ZITHROMAX) 500 mg in 0.9% sodium chloride (MBP/ADV) 250 mL  500 mg IntraVENous Q24H  
 acetaminophen (TYLENOL) tablet 650 mg  650 mg Oral Q4H PRN  
 naloxone (NARCAN) injection 0.4 mg  0.4 mg IntraVENous PRN  
 albuterol (PROVENTIL VENTOLIN) nebulizer solution 2.5 mg  2.5 mg Nebulization Q4H PRN  
 bisacodyl (DULCOLAX) tablet 5 mg  5 mg Oral DAILY PRN  
 guaiFENesin ER (MUCINEX) tablet 600 mg  600 mg Oral Q12H  
 HYDROcodone-homatropine (HYCODAN) 5-1.5 mg/5 mL (5 mL) syrup 5 mL  5 mL Oral Q4H PRN Review of Systems Constitutional: negative for fever, chills, sweats Cardiovascular: negative for chest pain, palpitations, syncope, edema Gastrointestinal:  negative for dysphagia, reflux, vomiting, diarrhea, abdominal pain, or melena Neurologic:  negative for focal weakness, numbness, headache Objective:  
 
Vitals:  
 10/31/18 2186 10/31/18 0009 10/31/18 0645 10/31/18 0652 BP:  104/69 100/63 98/60 Pulse: 79 90 85 87 Resp:  18 Temp:  98.3 °F (36.8 °C) 98.5 °F (36.9 °C) 98.1 °F (36.7 °C) SpO2:  96% 94% 95% Intake and Output:  
10/29 1901 - 10/31 0700 In: 2607 [P.O.:760; I.V.:1847] Out: 200 [Urine:200] No intake/output data recorded. Physical Exam:  
Constitution:  the patient is thin and in no acute distress, NC 5L sat 95% EENMT:  Sclera clear, pupils equal, oral mucosa moist 
Respiratory: scattered crackles, wet, productive cough at times Cardiovascular:  RRR without M,G,R 
Gastrointestinal: soft and non-tender; with positive bowel sounds. Musculoskeletal: warm without cyanosis. There is no lower leg edema. Skin:  no jaundice or rashes, no wounds Neurologic: no gross neuro deficits Psychiatric:  alert and oriented x 3 CXR: None today Chest CT 10/29/18:   
1. No pulmonary embolism. 2. Extensive multi lobar consolidation with most significant involvement of the left lower lobe CXR 10/29/18:   
 
 
LAB No results for input(s): GLUCPOC in the last 72 hours. No lab exists for component: [place] Recent Labs 10/31/18 
0617 10/29/18 
1645 WBC 16.4* 8.7 HGB 9.9* 11.6* HCT 28.9* 33.6*  
 307 Recent Labs 10/31/18 
4260 10/30/18 
4075 10/29/18 
1645  140 137  
K 3.0* 3.2* 2.6*  
* 107 99 CO2 22 25 25 GLU 86 97 101* BUN 10 14 13 CREA 0.30* 0.43* 0.53* MG 2.3  --  2.1 CA 6.7* 6.9* 6.9*  
TROIQ  --   --  <0.02* ALB 1.5*  --  2.0*  
TBILI 0.5  --  1.3* ALT 23  --  20 SGOT 50*  --  27 No results for input(s): PH, PCO2, PO2, HCO3, PHI, PCO2I, PO2I, HCO3I in the last 72 hours. No results for input(s): LCAD, LAC in the last 72 hours. Assessment:  (Medical Decision Making) Hospital Problems  Date Reviewed: 10/31/2018 Codes Class Noted POA Hypokalemia ICD-10-CM: E87.6 ICD-9-CM: 276.8  10/29/2018 Yes  
 supplementing per primary * (Principal) CAP (community acquired pneumonia) ICD-10-CM: J18.9 ICD-9-CM: 785  10/29/2018 Yes  
 continue antibiotics Hemoptysis ICD-10-CM: R04.2 ICD-9-CM: 786.30  10/29/2018 Yes  
 resolved Hypoxia ICD-10-CM: R09.02 
ICD-9-CM: 799.02  10/29/2018 Yes  
 remains on NC Tachycardia ICD-10-CM: R00.0 ICD-9-CM: 785.0  10/29/2018 Yes Resolved--HR 70-90s Anorexia nervosa ICD-10-CM: F50.00 ICD-9-CM: 307.1  4/9/2015 Yes  
 chronic Esophageal reflux ICD-10-CM: K21.9 ICD-9-CM: 530.81  4/9/2015 Yes Chronic--would benefit from GI when improved Plan:  (Medical Decision Making) --Unasyn day 2, Zithromax day 3 
--Blood cultures:  Pending --Respiratory culture:  pending 
--WBC up to 16.4>>was 8.7 on admission --ARGELIA, strep pneumo and scleroderma antibody :  pending 
--Mucinex--add flutter valve for mucus mobilization 
--NS 100ml/hr 
--CRP 17.1 
--K+ low 3.0--receiving supplement BID 
--Albumin low 1.5 
--When improved consider barium swallow vs GI consult vs GI follow up as outpatient 
--Consult PT for mobility More than 50% of the time documented was spent in face-to-face contact with the patient and in the care of the patient on the floor/unit where the patient is located. Wu Vincent NP Lungs:  Crackles in bases Heart:  RRR with no Murmur/Rubs/Gallops Additional Comments:  WBC up, but bandemia down (was 25% Yesterday). Micro pending. Afebrile and O2 improved. Continue Unasyn, Zithro for now. Could switch to orals (Augmentin, Zpack) if able to take PO. If unable to swallow pills still would recommend barium swallow. I have spoken with and examined the patient. I agree with the above assessment and plan as documented.  
 
Carmen Odell MD

## 2018-10-31 NOTE — PROGRESS NOTES
BSR received from Danielle Tamez RN shift assessment completed pt alert and oriented in bed resting call light within reach instructed pt to call for assistance no distress noted will continue to monitor

## 2018-10-31 NOTE — PROGRESS NOTES
Hospitalist Progress Note Admit Date:  10/29/2018  6:38 PM  
Name:  Tello Carmona Age:  45 y.o. 
:  1980 MRN:  [id number] PCP:  Marylen Seed, DO Treatment Team: Attending Provider: Nuha Swift MD; Consulting Provider: Deirdre Drummond MD; Care Manager: Myles Davila RN Subjective: Not eating--wants to drink pepsi only. Diet pizza and other fast food pre hospital.  Albumin serum only 1.5. Prior abnormal eating behavior but also gerd /esophagitis type sx. Cognitive chronic abnormality per father, but pt is semi-independ. Pre hospital.  I spoke with pulmonary service at bedside today and patient with no questions, but per father she functions at a second grade education cognitive level. Claims very nauseated with any food. Objective:  
 
Patient Vitals for the past 24 hrs: 
 Temp Pulse Resp BP SpO2  
10/31/18 0739 98.1 °F (36.7 °C) 87  98/60 95 % 10/31/18 0645 98.5 °F (36.9 °C) 85  100/63 94 % 10/31/18 0348 98.3 °F (36.8 °C) 90 18 104/69 96 % 10/31/18 0141  79     
10/31/18 0103    92/62   
10/30/18 2327 98 °F (36.7 °C) 78 18 (!) 82/46 96 % 10/30/18 2030  82     
10/30/18 1934 97.9 °F (36.6 °C) 80 16 92/60 99 % 10/30/18 1503 98 °F (36.7 °C) 78 17 102/71 97 % 10/30/18 1342     98 % 10/30/18 1107 97.9 °F (36.6 °C) 99  102/72 93 % Oxygen Therapy O2 Sat (%): 95 % (10/31/18 0739) Pulse via Oximetry: 75 beats per minute (10/30/18 1342) O2 Device: Nasal cannula (10/31/18 0742) O2 Flow Rate (L/min): 6 l/min (10/31/18 0742) ETCO2 (mmHg): 3 mmHg (10/29/18 1906) Intake/Output Summary (Last 24 hours) at 10/31/2018 0756 Last data filed at 10/31/2018 9888 Gross per 24 hour Intake 2607 ml Output 200 ml Net 2407 ml Physical Examination: 
General:    Lean body mass Head:  Normocephalic, atraumatic Eyes:  Extraocular movements intact, normal sclera CV:   RRR. No  Murmurs, clicks, or gallops Lungs:   Unlabored, no cyanosis Abdomen:   Soft, nondistended, nontender. no ascites obvious Extremities: Warm and dry. No cyanosis or edema. Skin:     No rashes or jaundice. Neuro:  No gross focal deficits Psych:  Anxious at times Data Review: 
I have reviewed all labs, meds, telemetry events, and studies from the last 24 hours. Recent Results (from the past 24 hour(s)) CULTURE, BLOOD Collection Time: 10/30/18  1:52 PM  
Result Value Ref Range Special Requests: RIGHT Antecubital 
    
 Culture result: NO GROWTH AFTER 17 HOURS    
CULTURE, BLOOD Collection Time: 10/30/18  2:02 PM  
Result Value Ref Range Special Requests: LEFT 
HAND Culture result: NO GROWTH AFTER 17 HOURS MAGNESIUM Collection Time: 10/31/18  6:16 AM  
Result Value Ref Range Magnesium 2.3 1.8 - 2.4 mg/dL CBC WITH AUTOMATED DIFF Collection Time: 10/31/18  6:16 AM  
Result Value Ref Range WBC 16.4 (H) 4.3 - 11.1 K/uL  
 RBC 3.22 (L) 4.05 - 5.2 M/uL HGB 9.9 (L) 11.7 - 15.4 g/dL HCT 28.9 (L) 35.8 - 46.3 % MCV 89.8 79.6 - 97.8 FL  
 MCH 30.7 26.1 - 32.9 PG  
 MCHC 34.3 31.4 - 35.0 g/dL  
 RDW 13.2 % PLATELET 561 608 - 329 K/uL MPV 10.2 9.4 - 12.3 FL ABSOLUTE NRBC 0.00 0.0 - 0.2 K/uL DF PENDING   
METABOLIC PANEL, COMPREHENSIVE Collection Time: 10/31/18  6:16 AM  
Result Value Ref Range Sodium 143 136 - 145 mmol/L Potassium 3.0 (L) 3.5 - 5.1 mmol/L Chloride 109 (H) 98 - 107 mmol/L  
 CO2 22 21 - 32 mmol/L Anion gap 12 7 - 16 mmol/L Glucose 86 65 - 100 mg/dL BUN 10 6 - 23 MG/DL Creatinine 0.30 (L) 0.6 - 1.0 MG/DL  
 GFR est AA >60 >60 ml/min/1.73m2 GFR est non-AA >60 >60 ml/min/1.73m2 Calcium 6.7 (L) 8.3 - 10.4 MG/DL Bilirubin, total 0.5 0.2 - 1.1 MG/DL  
 ALT (SGPT) 23 12 - 65 U/L  
 AST (SGOT) 50 (H) 15 - 37 U/L Alk. phosphatase 79 50 - 136 U/L Protein, total 5.3 (L) 6.3 - 8.2 g/dL Albumin 1.5 (L) 3.5 - 5.0 g/dL Globulin 3.8 (H) 2.3 - 3.5 g/dL A-G Ratio 0.4 (L) 1.2 - 3.5 All Micro Results Procedure Component Value Units Date/Time CULTURE, BLOOD [[id number]] Collected:  10/30/18 1352 Order Status:  Completed Specimen:  Blood Updated:  10/31/18 2259 Special Requests: --     
  RIGHT Antecubital 
  
  Culture result: NO GROWTH AFTER 17 HOURS     
 CULTURE, BLOOD [[id number]] Collected:  10/30/18 1402 Order Status:  Completed Specimen:  Blood Updated:  10/31/18 0692 Special Requests: --     
  LEFT 
HAND Culture result: NO GROWTH AFTER 17 HOURS     
 CULTURE, RESPIRATORY/SPUTUM/BRONCH Aga Creeks STAIN [[id number]] Collected:  10/30/18 1827 Order Status:  Completed Specimen:  Sputum Updated:  10/30/18 1947 AFB CULTURE + SMEAR W/RFLX ID FROM CULTURE [[id number]] Order Status:  Sent FUNGUS CULTURE AND SMEAR [[id number]] Order Status:  Sent Specimen:  Other DARRICK Grover, UR/CSF [[id number]] Collected:  10/29/18 1927 Order Status:  Completed Specimen:  Other Updated:  10/30/18 0426 Current Meds: 
Current Facility-Administered Medications Medication Dose Route Frequency  potassium chloride (K-DUR, KLOR-CON) SR tablet 40 mEq  40 mEq Oral ONCE  
 levothyroxine (SYNTHROID) tablet 25 mcg  25 mcg Oral 6am  
 cholecalciferol (VITAMIN D3) tablet 2,000 Units  2,000 Units Oral DAILY  QUEtiapine (SEROquel) tablet 200 mg  200 mg Oral QHS  sucralfate (CARAFATE) 100 mg/mL oral suspension 0.5 g  0.5 g Oral AC&HS  promethazine (PHENERGAN) tablet 25 mg  25 mg Oral Q6H PRN  
 ondansetron (ZOFRAN) injection 4 mg  4 mg IntraVENous Q4H PRN  pantoprazole (PROTONIX) tablet 40 mg  40 mg Oral ACB  ampicillin-sulbactam (UNASYN) 3 g in 0.9% sodium chloride (MBP/ADV) 100 mL  3 g IntraVENous Q6H  
 potassium chloride (K-DUR, KLOR-CON) SR tablet 40 mEq  40 mEq Oral BID  
 0.9% sodium chloride infusion  50 mL/hr IntraVENous CONTINUOUS  
  sodium chloride (NS) flush 5-10 mL  5-10 mL IntraVENous Q8H  
 sodium chloride (NS) flush 5-10 mL  5-10 mL IntraVENous PRN  
 azithromycin (ZITHROMAX) 500 mg in 0.9% sodium chloride (MBP/ADV) 250 mL  500 mg IntraVENous Q24H  
 acetaminophen (TYLENOL) tablet 650 mg  650 mg Oral Q4H PRN  
 naloxone (NARCAN) injection 0.4 mg  0.4 mg IntraVENous PRN  
 albuterol (PROVENTIL VENTOLIN) nebulizer solution 2.5 mg  2.5 mg Nebulization Q4H PRN  
 bisacodyl (DULCOLAX) tablet 5 mg  5 mg Oral DAILY PRN  
 guaiFENesin ER (MUCINEX) tablet 600 mg  600 mg Oral Q12H  
 HYDROcodone-homatropine (HYCODAN) 5-1.5 mg/5 mL (5 mL) syrup 5 mL  5 mL Oral Q4H PRN Diet: DIET REGULAR 
DIET NUTRITIONAL SUPPLEMENTS Other Studies (last 24 hours): No results found. Assessment and Plan:  
 
Hospital Problems as of 10/31/2018 Date Reviewed: 10/31/2018 Codes Class Noted - Resolved POA Hypokalemia ICD-10-CM: E87.6 ICD-9-CM: 276.8  10/29/2018 - Present Yes * (Principal) CAP (community acquired pneumonia) ICD-10-CM: J18.9 ICD-9-CM: 072  10/29/2018 - Present Yes Hemoptysis ICD-10-CM: R04.2 ICD-9-CM: 786.30  10/29/2018 - Present Yes Hypoxia ICD-10-CM: R09.02 
ICD-9-CM: 799.02  10/29/2018 - Present Yes Tachycardia ICD-10-CM: R00.0 ICD-9-CM: 785.0  10/29/2018 - Present Yes Anorexia nervosa ICD-10-CM: F50.00 ICD-9-CM: 307.1  4/9/2015 - Present Yes Esophageal reflux ICD-10-CM: K21.9 ICD-9-CM: 530.81  4/9/2015 - Present Yes A/P:   
Principal Problem: 
  CAP (community acquired pneumonia) (10/29/2018)--pulm consult appreciated and antibiotic change noted, sputum sent last pm. 
Clinically improving slowly 
  
Active Problems: 
  Anorexia nervosa (4/9/2015) vs. Donna Base, esophageal abn. [place] and scl ordered by pulm. --reticulonod. Findings on ct chest noted. n utrtion consult pending but refusing supplements.  I will check pre-albumin level. 
  
 Esophageal reflux (4/9/2015)--now ppi, carafate Hypokalemia (10/29/2018)--mg ok, decrease ivf and furhter oral kcl, follow up 
  
  Hypoxia (10/29/2018)--oxygen req. Are decreasing 
  
  Tachycardia (10/29/2018)--improved

## 2018-10-31 NOTE — PROGRESS NOTES
Patient requested medication to help with coughing. Patient asleep when nurse returned to room. Will hold medication at this time. Will continue to monitor patient.

## 2018-10-31 NOTE — PROGRESS NOTES
Patient request medication for cough. Patient received Hycodan 5 mL @ 0544. Will continue to monitor.

## 2018-10-31 NOTE — PROGRESS NOTES
Received report from Christy Hogan RN. Patient awake in bed. Respirations present. No signs of distress at this time. Bed low and locked. Call light within reach. Will continue to monitor.

## 2018-10-31 NOTE — PROGRESS NOTES
Patient awake resting in bed at this time. Respirations present. No signs of distress. Bed low and locked. Call light within reach. Report given to oncoming RN.

## 2018-10-31 NOTE — PROGRESS NOTES
Patient stated she felt \"SOB and having a reaction to medication\". Patient VSS. No signs of distress at this time. Will continue to monitor.

## 2018-10-31 NOTE — PROGRESS NOTES
Reassessment completed no changes noted pt in bed resting call light within reach instructed pt to call for assistance no distress noted will continue to monitor

## 2018-10-31 NOTE — PROGRESS NOTES
Problem: Interdisciplinary Rounds Goal: Interdisciplinary Rounds Outcome: Progressing Towards Goal 
Interdisciplinary team rounds were held 10/31/2018 with the following team members:Care Management, Nursing, Physical Therapy and Physician and the patient. Plan of care discussed. See clinical pathway and/or care plan for interventions and desired outcomes.

## 2018-10-31 NOTE — PROGRESS NOTES
PT note: 
 
Chart reviewed and attempted PT evaluation this afternoon however pt declined participating at this time due to increased nausea. Will check back later time/date as schedule allows. Thanks, KATHRYN MooreT

## 2018-11-01 ENCOUNTER — APPOINTMENT (OUTPATIENT)
Dept: GENERAL RADIOLOGY | Age: 38
DRG: 139 | End: 2018-11-01
Attending: INTERNAL MEDICINE
Payer: COMMERCIAL

## 2018-11-01 LAB
ANION GAP SERPL CALC-SCNC: 11 MMOL/L (ref 7–16)
BUN SERPL-MCNC: 3 MG/DL (ref 6–23)
CALCIUM SERPL-MCNC: 6.4 MG/DL (ref 8.3–10.4)
CHLORIDE SERPL-SCNC: 102 MMOL/L (ref 98–107)
CO2 SERPL-SCNC: 25 MMOL/L (ref 21–32)
CREAT SERPL-MCNC: 0.18 MG/DL (ref 0.6–1)
FLUID CULTURE, SPNG2: NORMAL
GLUCOSE SERPL-MCNC: 75 MG/DL (ref 65–100)
ORGANISM ID, SPNG3: NORMAL
PLEASE NOTE, SPNG4: NORMAL
POTASSIUM SERPL-SCNC: 3 MMOL/L (ref 3.5–5.1)
PREALB SERPL-MCNC: 4.91 MG/DL (ref 18–35.7)
PROCALCITONIN SERPL-MCNC: 26.8 NG/ML
S PNEUM AG SPEC QL LA: NEGATIVE
SODIUM SERPL-SCNC: 138 MMOL/L (ref 136–145)
SPECIMEN SOURCE: NORMAL
SPECIMEN, SPNG1: NORMAL
TROPONIN I SERPL-MCNC: <0.02 NG/ML (ref 0.02–0.05)
TROPONIN I SERPL-MCNC: <0.02 NG/ML (ref 0.02–0.05)

## 2018-11-01 PROCEDURE — 80048 BASIC METABOLIC PNL TOTAL CA: CPT

## 2018-11-01 PROCEDURE — 84134 ASSAY OF PREALBUMIN: CPT

## 2018-11-01 PROCEDURE — 99232 SBSQ HOSP IP/OBS MODERATE 35: CPT | Performed by: INTERNAL MEDICINE

## 2018-11-01 PROCEDURE — 74011250637 HC RX REV CODE- 250/637: Performed by: HOSPITALIST

## 2018-11-01 PROCEDURE — 74011250637 HC RX REV CODE- 250/637: Performed by: NURSE PRACTITIONER

## 2018-11-01 PROCEDURE — 74011000258 HC RX REV CODE- 258: Performed by: INTERNAL MEDICINE

## 2018-11-01 PROCEDURE — 94760 N-INVAS EAR/PLS OXIMETRY 1: CPT

## 2018-11-01 PROCEDURE — 77030020263 HC SOL INJ SOD CL0.9% LFCR 1000ML

## 2018-11-01 PROCEDURE — 74011000250 HC RX REV CODE- 250: Performed by: INTERNAL MEDICINE

## 2018-11-01 PROCEDURE — 77010033678 HC OXYGEN DAILY

## 2018-11-01 PROCEDURE — 36415 COLL VENOUS BLD VENIPUNCTURE: CPT

## 2018-11-01 PROCEDURE — 74011250636 HC RX REV CODE- 250/636: Performed by: INTERNAL MEDICINE

## 2018-11-01 PROCEDURE — 71046 X-RAY EXAM CHEST 2 VIEWS: CPT

## 2018-11-01 PROCEDURE — 84484 ASSAY OF TROPONIN QUANT: CPT

## 2018-11-01 PROCEDURE — 74011250636 HC RX REV CODE- 250/636: Performed by: HOSPITALIST

## 2018-11-01 PROCEDURE — 84145 PROCALCITONIN (PCT): CPT

## 2018-11-01 PROCEDURE — 65660000000 HC RM CCU STEPDOWN

## 2018-11-01 RX ORDER — POTASSIUM CHLORIDE 20 MEQ/1
40 TABLET, EXTENDED RELEASE ORAL 3 TIMES DAILY
Status: COMPLETED | OUTPATIENT
Start: 2018-11-01 | End: 2018-11-01

## 2018-11-01 RX ADMIN — Medication 10 ML: at 22:33

## 2018-11-01 RX ADMIN — Medication 10 ML: at 05:34

## 2018-11-01 RX ADMIN — ACETAMINOPHEN 650 MG: 325 TABLET, FILM COATED ORAL at 09:13

## 2018-11-01 RX ADMIN — POTASSIUM CHLORIDE 40 MEQ: 20 TABLET, EXTENDED RELEASE ORAL at 17:43

## 2018-11-01 RX ADMIN — GUAIFENESIN 600 MG: 600 TABLET, EXTENDED RELEASE ORAL at 22:33

## 2018-11-01 RX ADMIN — GUAIFENESIN 600 MG: 600 TABLET, EXTENDED RELEASE ORAL at 09:13

## 2018-11-01 RX ADMIN — HYDROCODONE BITARTRATE AND HOMATROPINE METHYLBROMIDE 5 ML: 5; 1.5 SOLUTION ORAL at 05:30

## 2018-11-01 RX ADMIN — POTASSIUM CHLORIDE 40 MEQ: 20 TABLET, EXTENDED RELEASE ORAL at 09:13

## 2018-11-01 RX ADMIN — ACETAMINOPHEN 650 MG: 325 TABLET, FILM COATED ORAL at 15:20

## 2018-11-01 RX ADMIN — AMPICILLIN SODIUM AND SULBACTAM SODIUM 3 G: 2; 1 INJECTION, POWDER, FOR SOLUTION INTRAMUSCULAR; INTRAVENOUS at 15:19

## 2018-11-01 RX ADMIN — SUCRALFATE 0.5 G: 1 SUSPENSION ORAL at 22:33

## 2018-11-01 RX ADMIN — SUCRALFATE 0.5 G: 1 SUSPENSION ORAL at 17:43

## 2018-11-01 RX ADMIN — HYDROCODONE BITARTRATE AND HOMATROPINE METHYLBROMIDE 5 ML: 5; 1.5 SOLUTION ORAL at 15:19

## 2018-11-01 RX ADMIN — QUETIAPINE FUMARATE 200 MG: 100 TABLET ORAL at 22:32

## 2018-11-01 RX ADMIN — POTASSIUM CHLORIDE: 2 INJECTION, SOLUTION, CONCENTRATE INTRAVENOUS at 17:44

## 2018-11-01 RX ADMIN — ONDANSETRON 4 MG: 2 INJECTION INTRAMUSCULAR; INTRAVENOUS at 15:20

## 2018-11-01 RX ADMIN — AMPICILLIN SODIUM AND SULBACTAM SODIUM 3 G: 2; 1 INJECTION, POWDER, FOR SOLUTION INTRAMUSCULAR; INTRAVENOUS at 00:09

## 2018-11-01 RX ADMIN — AMPICILLIN SODIUM AND SULBACTAM SODIUM 3 G: 2; 1 INJECTION, POWDER, FOR SOLUTION INTRAMUSCULAR; INTRAVENOUS at 05:32

## 2018-11-01 RX ADMIN — HYDROCODONE BITARTRATE AND HOMATROPINE METHYLBROMIDE 5 ML: 5; 1.5 SOLUTION ORAL at 10:39

## 2018-11-01 RX ADMIN — AMPICILLIN SODIUM AND SULBACTAM SODIUM 3 G: 2; 1 INJECTION, POWDER, FOR SOLUTION INTRAMUSCULAR; INTRAVENOUS at 22:32

## 2018-11-01 RX ADMIN — Medication 10 ML: at 15:20

## 2018-11-01 RX ADMIN — SUCRALFATE 0.5 G: 1 SUSPENSION ORAL at 05:32

## 2018-11-01 RX ADMIN — PROMETHAZINE HYDROCHLORIDE 25 MG: 25 TABLET ORAL at 02:24

## 2018-11-01 RX ADMIN — SODIUM CHLORIDE 50 ML/HR: 900 INJECTION, SOLUTION INTRAVENOUS at 10:47

## 2018-11-01 RX ADMIN — POTASSIUM CHLORIDE 40 MEQ: 20 TABLET, EXTENDED RELEASE ORAL at 22:33

## 2018-11-01 RX ADMIN — VITAMIN D, TAB 1000IU (100/BT) 2000 UNITS: 25 TAB at 09:13

## 2018-11-01 RX ADMIN — SUCRALFATE 0.5 G: 1 SUSPENSION ORAL at 10:39

## 2018-11-01 RX ADMIN — AZITHROMYCIN 500 MG: 250 TABLET, FILM COATED ORAL at 17:43

## 2018-11-01 RX ADMIN — ONDANSETRON 4 MG: 2 INJECTION INTRAMUSCULAR; INTRAVENOUS at 10:39

## 2018-11-01 NOTE — PROGRESS NOTES
Hospitalist Progress Note Admit Date:  10/29/2018  6:38 PM  
Name:  Michael Odom Age:  45 y.o. 
:  1980 MRN:  [id number] PCP:  Shannon Aden DO Treatment Team: Attending Provider: Dede Castillo MD; Consulting Provider: Aidan Griffiths MD; Care Manager: Paulie Randle, RN; Utilization Review: Uvaldo Huang Subjective:  
From note 10/31: 
Not eating--wants to drink pepsi only. Diet pizza and other fast food pre hospital.  Albumin serum only 1.5. Prior abnormal eating behavior but also gerd /esophagitis type sx. Cognitive chronic abnormality per father, but pt is semi-independ. Pre hospital.  I spoke with pulmonary service at bedside today and patient with no questions, but per father she functions at a second grade education cognitive level. Claims very nauseated with any food. Today: 
Few bites of chicken fingers mom brought. Acute refusal of oral intake except pepsi essentially since acute pneumonia. Chronic lean body mass and food behaviors strongly suggestive of eating disorder. gerd sx better? But persistant nausea. Objective:  
 
Patient Vitals for the past 24 hrs: 
 Temp Pulse Resp BP SpO2  
18 1221 98.9 °F (37.2 °C) 88 18 124/78 96 % 18 0744 99.2 °F (37.3 °C) (!) 105 18 110/77 96 % 18 0316 98.3 °F (36.8 °C) 81 18 137/84 97 % 10/31/18 2302 98.6 °F (37 °C) 83 18 96/62 96 % 10/31/18 2047  99     
10/31/18 193 97.8 °F (36.6 °C) 92 18 97/62 95 % 10/31/18 1926     96 % 10/31/18 1706     96 % 10/31/18 1628 98.3 °F (36.8 °C) (!) 103 16 102/64 94 % Oxygen Therapy O2 Sat (%): 96 % (18 1221) Pulse via Oximetry: 89 beats per minute (10/31/18 1926) O2 Device: Nasal cannula (10/31/18 1937) O2 Flow Rate (L/min): 2 l/min (10/31/18 1937) FIO2 (%): 28 % (10/31/18 1926) ETCO2 (mmHg): 3 mmHg (10/29/18 190) Intake/Output Summary (Last 24 hours) at 2018 1423 Last data filed at 2018 9993 Gross per 24 hour Intake 2073 ml Output 400 ml Net 1673 ml Physical Examination: 
General:    Cachectic, alert, poor historian--cognitive function of second grader per father. Isma Austin Head:  Normocephalic, atraumatic Eyes:  Extraocular movements intact, normal sclera CV:   RRR. No  Murmurs, clicks, or gallops Lungs:   Rhonchi bilateral with no loud wheeze at time of exam 
Abdomen:   Soft, nondistended, nontender. Extremities: Warm and dry. Lean body mass Skin:     Arm hair Neuro:  No gross focal deficits Psych:  Anxious withdrawn, refusing to eat and did notparticipate pt this am 
 
Data Review: 
I have reviewed all labs, meds, telemetry events, and studies from the last 24 hours. Recent Results (from the past 24 hour(s)) METABOLIC PANEL, BASIC Collection Time: 11/01/18  5:20 AM  
Result Value Ref Range Sodium 138 136 - 145 mmol/L Potassium 3.0 (L) 3.5 - 5.1 mmol/L Chloride 102 98 - 107 mmol/L  
 CO2 25 21 - 32 mmol/L Anion gap 11 7 - 16 mmol/L Glucose 75 65 - 100 mg/dL BUN 3 (L) 6 - 23 MG/DL Creatinine 0.18 (L) 0.6 - 1.0 MG/DL  
 GFR est AA >60 >60 ml/min/1.73m2 GFR est non-AA >60 >60 ml/min/1.73m2 Calcium 6.4 (L) 8.3 - 10.4 MG/DL PREALBUMIN Collection Time: 11/01/18  5:20 AM  
Result Value Ref Range Prealbumin 4.91 (L) 18.0 - 35.7 MG/DL PROCALCITONIN Collection Time: 11/01/18  5:20 AM  
Result Value Ref Range Procalcitonin 26.8 ng/mL TROPONIN I Collection Time: 11/01/18 10:23 AM  
Result Value Ref Range Troponin-I, Qt. <0.02 (L) 0.02 - 0.05 NG/ML All Micro Results Procedure Component Value Units Date/Time CULTURE, RESPIRATORY/SPUTUM/BRONCH Rachel Hopkins [[id number]] Collected:  10/30/18 3065 Order Status:  Completed Specimen:  Sputum Updated:  11/01/18 6142   Special Requests: NO SPECIAL REQUESTS     
  GRAM STAIN NO DEFINITE ORGANISM SEEN     
   22 TO 47 WBC'S SEEN PER OIF  
   NO EPITHELIAL CELLS SEEN     
 4+ MUCUS PRESENT Culture result: SCANT NORMAL RESPIRATORY ALF  
     
 CULTURE, BLOOD [[id number]] Collected:  10/30/18 1352 Order Status:  Completed Specimen:  Blood Updated:  11/01/18 6955 Special Requests: --     
  RIGHT Antecubital 
  
  Culture result: NO GROWTH 2 DAYS     
 CULTURE, BLOOD [[id number]] Collected:  10/30/18 1402 Order Status:  Completed Specimen:  Blood Updated:  11/01/18 8265 Special Requests: --     
  LEFT 
HAND Culture result: NO GROWTH 2 DAYS     
 AFB CULTURE + SMEAR W/RFLX ID FROM CULTURE [[id number]] Order Status:  Sent FUNGUS CULTURE AND SMEAR [[id number]] Order Status:  Sent Specimen:  Other DARRICK Mitchell, UR/CSF [[id number]] Collected:  10/29/18 1927 Order Status:  Completed Specimen:  Other Updated:  10/30/18 9968 Current Meds: 
Current Facility-Administered Medications Medication Dose Route Frequency  potassium chloride (K-DUR, KLOR-CON) SR tablet 40 mEq  40 mEq Oral TID  azithromycin (ZITHROMAX) tablet 500 mg  500 mg Oral Q24H  
 levothyroxine (SYNTHROID) tablet 25 mcg  25 mcg Oral 6am  
 cholecalciferol (VITAMIN D3) tablet 2,000 Units  2,000 Units Oral DAILY  QUEtiapine (SEROquel) tablet 200 mg  200 mg Oral QHS  sucralfate (CARAFATE) 100 mg/mL oral suspension 0.5 g  0.5 g Oral AC&HS  promethazine (PHENERGAN) tablet 25 mg  25 mg Oral Q6H PRN  
 ondansetron (ZOFRAN) injection 4 mg  4 mg IntraVENous Q4H PRN  pantoprazole (PROTONIX) tablet 40 mg  40 mg Oral ACB  ampicillin-sulbactam (UNASYN) 3 g in 0.9% sodium chloride (MBP/ADV) 100 mL  3 g IntraVENous Q6H  
 0.9% sodium chloride infusion  50 mL/hr IntraVENous CONTINUOUS  
 sodium chloride (NS) flush 5-10 mL  5-10 mL IntraVENous Q8H  
 sodium chloride (NS) flush 5-10 mL  5-10 mL IntraVENous PRN  
 acetaminophen (TYLENOL) tablet 650 mg  650 mg Oral Q4H PRN  
 naloxone (NARCAN) injection 0.4 mg  0.4 mg IntraVENous PRN  
  albuterol (PROVENTIL VENTOLIN) nebulizer solution 2.5 mg  2.5 mg Nebulization Q4H PRN  
 bisacodyl (DULCOLAX) tablet 5 mg  5 mg Oral DAILY PRN  
 guaiFENesin ER (MUCINEX) tablet 600 mg  600 mg Oral Q12H  
 HYDROcodone-homatropine (HYCODAN) 5-1.5 mg/5 mL (5 mL) syrup 5 mL  5 mL Oral Q4H PRN Diet: DIET REGULAR 
DIET NUTRITIONAL SUPPLEMENTS Other Studies (last 24 hours): No results found. Assessment and Plan:  
 
Hospital Problems as of 11/1/2018 Date Reviewed: 11/1/2018 Codes Class Noted - Resolved POA Protein-calorie malnutrition, moderate (HCC) ICD-10-CM: E44.0 ICD-9-CM: 263.0  10/31/2018 - Present Unknown Hypokalemia ICD-10-CM: E87.6 ICD-9-CM: 276.8  10/29/2018 - Present Yes * (Principal) CAP (community acquired pneumonia) ICD-10-CM: J18.9 ICD-9-CM: 775  10/29/2018 - Present Yes Hemoptysis ICD-10-CM: R04.2 ICD-9-CM: 786.30  10/29/2018 - Present Yes Hypoxia ICD-10-CM: R09.02 
ICD-9-CM: 799.02  10/29/2018 - Present Yes Tachycardia ICD-10-CM: R00.0 ICD-9-CM: 785.0  10/29/2018 - Present Yes Anorexia nervosa ICD-10-CM: F50.00 ICD-9-CM: 307.1  4/9/2015 - Present Esophageal reflux ICD-10-CM: K21.9 ICD-9-CM: 530.81  4/9/2015 - Present Yes A/P:   
 
  CAP (community acquired pneumonia) (10/29/2018)--immune function likely impaired with protein calorie malnutrition. continue current antibiotics, follow up pa lat cxr. PPD as will likely need ecf for rehab, nutrition rehab, if she becomes well enough---concern is can she clinically improve given current oral intake and pre-existing nutrition impairment. 
  
Active Problems: 
  Anorexia nervosa (4/9/2015) vs. Cristian Ortiz, esophageal abn. Noted on imaging.--consult gastroenterology consider egd. Essentially npo--refusing most food-- but official after midnight.---cognitive impairment would likely not tolerate or keep dobhoff tube. Pre-albumin low. Moderate protein calorie malnutrition -- above--  
  
  Esophageal reflux (4/9/2015)--now ppi, carafate-gi consult as above 
  
  Hypokalemia (10/29/2018)--supplement to k 4 
  
  Hypoxia (10/29/2018)--wean oxygen as able

## 2018-11-01 NOTE — PROGRESS NOTES
Problem: Interdisciplinary Rounds Goal: Interdisciplinary Rounds Interdisciplinary team rounds were held 11/1/2018 with the following team members:Care Management, Physical Therapy, Physician and Clinical Coordinator and the patient and mother. Plan of care discussed. See clinical pathway and/or care plan for interventions and desired outcomes.

## 2018-11-01 NOTE — PROGRESS NOTES
[facility] shift report received from DASIA Garcia. Pt stable. Assessment complete. Pt is lying in bed, watching tv. Resp even, unlabored. Pt is alert, orient X 3. Pt appears in no acute distress at this time. Pt is on 2L nasal cannula 02. Pt has SCD's in place. Pt encouraged to call for assistance, call light in reach. Safety measures in place. Will continue to monitor

## 2018-11-01 NOTE — PROGRESS NOTES
Tello Carmona Admission Date: 10/29/2018 Daily Progress Note: 11/1/2018 The patient's chart is reviewed and the patient is discussed with the staff. 
 
45 y.o. CF evaluated at the request of Dr. Breonna Weston. Has mental delay with reported 2nd grade education equivalent but is reported to be independent, h/o smoking-quit 1 year ago and has been vaping for the past year. H/o pneumonia in 2015, denies h/o asthma or COPD. About 4 days ago began to feel poorly with subjective fever, cough and shortness of breath. Was admitted and CXR and CT showing pneumonia L>R bases. She reported an episode of scant hemoptysis but not produced much over the past 16 hours. She required O2 and has been weaned to 10L HFNC at this time. She reports history of GERD and previously was on PPI. Has HOB elevated and since doing has less reflux symptoms. Has had aspiration episodes in past with reflux, but no overt pneumonia and none recently. Stays at home all day, doesn't work, no infectious contacts.  perhaps had a viral GI infection past week. Subjective:  
 
Complaines of cough treated with Hycodan and on going nausea--receiving Zofran and Phenergan. Staff has not observed vomiting--she states she throws emesis away. States she vomits about every other day at home. Lying in bed, still feels weak and needs assistance to get to [facility]. Denies shortness of breath or cough and remains on NC. Current Facility-Administered Medications Medication Dose Route Frequency  azithromycin (ZITHROMAX) tablet 500 mg  500 mg Oral Q24H  
 levothyroxine (SYNTHROID) tablet 25 mcg  25 mcg Oral 6am  
 cholecalciferol (VITAMIN D3) tablet 2,000 Units  2,000 Units Oral DAILY  QUEtiapine (SEROquel) tablet 200 mg  200 mg Oral QHS  sucralfate (CARAFATE) 100 mg/mL oral suspension 0.5 g  0.5 g Oral AC&HS  promethazine (PHENERGAN) tablet 25 mg  25 mg Oral Q6H PRN  
  ondansetron (ZOFRAN) injection 4 mg  4 mg IntraVENous Q4H PRN  pantoprazole (PROTONIX) tablet 40 mg  40 mg Oral ACB  ampicillin-sulbactam (UNASYN) 3 g in 0.9% sodium chloride (MBP/ADV) 100 mL  3 g IntraVENous Q6H  
 0.9% sodium chloride infusion  50 mL/hr IntraVENous CONTINUOUS  
 sodium chloride (NS) flush 5-10 mL  5-10 mL IntraVENous Q8H  
 sodium chloride (NS) flush 5-10 mL  5-10 mL IntraVENous PRN  
 acetaminophen (TYLENOL) tablet 650 mg  650 mg Oral Q4H PRN  
 naloxone (NARCAN) injection 0.4 mg  0.4 mg IntraVENous PRN  
 albuterol (PROVENTIL VENTOLIN) nebulizer solution 2.5 mg  2.5 mg Nebulization Q4H PRN  
 bisacodyl (DULCOLAX) tablet 5 mg  5 mg Oral DAILY PRN  
 guaiFENesin ER (MUCINEX) tablet 600 mg  600 mg Oral Q12H  
 HYDROcodone-homatropine (HYCODAN) 5-1.5 mg/5 mL (5 mL) syrup 5 mL  5 mL Oral Q4H PRN Review of Systems Constitutional: negative for fever, chills, sweats Cardiovascular: negative for chest pain, palpitations, syncope, edema Gastrointestinal:  Reports vomiting and ongoing nausea, negative for dysphagia, reflux, diarrhea, abdominal pain, or melena Neurologic:  negative for focal weakness, numbness, headache Objective:  
 
Vitals:  
 10/31/18 1937 10/31/18 2047 10/31/18 2302 11/01/18 1708 BP: 97/62  96/62 137/84 Pulse: 92 99 83 81 Resp: 18 18 18 Temp: 97.8 °F (36.6 °C)  98.6 °F (37 °C) 98.3 °F (36.8 °C) SpO2: 95%  96% 97% Intake and Output:  
10/30 0701 - 10/31 1900 In: 3447 [P.O.:1600; I.V.:1847] Out: 700 [Urine:700] 10/31 1901 - 11/01 0700 In: 7702 [I.V.:1593] Out: 200 [Urine:200] Physical Exam:  
Constitution:  the patient is thin and in no acute distress, NC 2L sat 97% EENMT:  Sclera clear, pupils equal, oral mucosa moist 
Respiratory: few scattered crackles, L>R, productive cough at times Cardiovascular:  RRR without M,G,R 
Gastrointestinal: soft and non-tender; with positive bowel sounds. Musculoskeletal: warm without cyanosis. There is no lower leg edema. Skin:  no jaundice or rashes, no wounds Neurologic: no gross neuro deficits Psychiatric:  alert and oriented x 3 CXR: None today Chest CT 10/29/18:   
1. No pulmonary embolism. 2. Extensive multi lobar consolidation with most significant involvement of the left lower lobe CXR 10/29/18:   
 
 
LAB No results for input(s): GLUCPOC in the last 72 hours. No lab exists for component: [place] Recent Labs 10/31/18 
9871 10/29/18 
1645 WBC 16.4* 8.7 HGB 9.9* 11.6* HCT 28.9* 33.6*  
 307 Recent Labs 10/31/18 
5575 10/30/18 
5840 10/29/18 
1645  140 137  
K 3.0* 3.2* 2.6*  
* 107 99 CO2 22 25 25 GLU 86 97 101* BUN 10 14 13 CREA 0.30* 0.43* 0.53* MG 2.3  --  2.1 CA 6.7* 6.9* 6.9*  
TROIQ  --   --  <0.02* ALB 1.5*  --  2.0*  
TBILI 0.5  --  1.3* ALT 23  --  20 SGOT 50*  --  27 Assessment:  (Medical Decision Making) Hospital Problems  Date Reviewed: 11/1/2018 Codes Class Noted POA Hypokalemia ICD-10-CM: E87.6 ICD-9-CM: 276.8  10/29/2018 Yes  
 supplementing --follow up labs in AM  
 * (Principal) CAP (community acquired pneumonia) ICD-10-CM: J18.9 ICD-9-CM: 803  10/29/2018 Yes  
 continue antibiotics Hemoptysis ICD-10-CM: R04.2 ICD-9-CM: 786.30  10/29/2018 Yes  
 resolved Hypoxia ICD-10-CM: R09.02 
ICD-9-CM: 799.02  10/29/2018 Yes  
 remains on NC Tachycardia ICD-10-CM: R00.0 ICD-9-CM: 785.0  10/29/2018 Yes Resolved--HR 80-90s Anorexia nervosa ICD-10-CM: F50.00 ICD-9-CM: 307.1  4/9/2015 Yes  
 chronic Esophageal reflux ICD-10-CM: K21.9 ICD-9-CM: 530.81  4/9/2015 Yes Chronic--would benefit from GI when improved Plan:  (Medical Decision Making) --Unasyn day 3, Zithromax day 4--possible switch to Augmentin or Z-pack if able to take PO. --Blood cultures:  Pending --Respiratory culture:  pending --Recent WBC up to 16.4>>was 8.7 on admission 
--Recent CRP 17.1 
--ARGELIA:  negative, scleroderma antibody: negative- 0.2  
--Strep pneumo antigen:  pending  
--Mucinex, flutter valve  
--NS 50ml/hr 
--K+ low 3.0--give supplement TID today 
--Albumin low 1.5 
--When improved consider barium swallow vs GI consult vs GI follow up as outpatient 
--PT for mobility 
--Follow up labs in AM 
 
More than 50% of the time documented was spent in face-to-face contact with the patient and in the care of the patient on the floor/unit where the patient is located. Cain Zamora NP Lungs:  Clear Heart:  RRR with no Murmur/Rubs/Gallops Additional Comments:  Continue ABX, wean 02, recheck CBC in AM, complains of nausea but no vomiting I have spoken with and examined the patient. I agree with the above assessment and plan as documented.  
 
Akash Darby MD

## 2018-11-01 NOTE — PROGRESS NOTES
PT note: Attempted PT evaluation again this morning however pt continues to refuse PT due to nausea despite max encouragement and education. Will check back later time/date as schedule allows. Thanks, Pb Sanchez, DPT

## 2018-11-01 NOTE — CONSULTS
[facility] Consult Note       Primary GI Physician: Dr Garth Millan    Referring Physician:      Consult Date:  11/1/2018    Admit Date:  10/29/2018    Chief Complaint:  N/V Anorexia     Subjective:     History of Present Illness:  Patient is a 45 y.o. female with PMH of anorexia, GERD and impaired cognitive abilities on diablity, who is seen in consultation at the request of Dr. Martinez Sanches for above. She was admitted for SOB and cough. She has a hx of smoking but no hx of COPD or asthma. Chest Xray with pneumonia. WBC elevated to 16.4 yesterday. Patient on Unasyn and Azithromycin. CT negative for PE. She has a hx of eating disorder and Mother at bedside reports that she has cognitive disability and is on [facility] for this. Reports the patient only eats junk food and no vegetables and will not eat if she is in pain or uncomfortable. Reports intermitted N/V for years. ABD cramping has been an issue and many therapies tried by GYN. She was dx with \"gastritis by PCP 1 week ago. Takes Protonix 40 mg daily. Also takes Aleve several in a day. Denies melena. Diarrhea 2 days ago, none now. Seen in our office in 2012 for N/V and anorexia. Weight at that time was 72 pounds. Librax was continued and appetite stimulant was recommended. She reported EGD and [place] in 2005 for N/V, alternating D/C with SB bx negative. She is s/p abdulaziz in 2005 for cholelithiasis. She denies sig abd pain. CXR 11/1/18  IMPRESSION:   1. Worsening basilar infiltrates and increasing now small to moderate left  basilar effusions suggesting worsening pneumonia.        CT Chest 10/29  IMPRESSION:  1. No pulmonary embolism. 2. Extensive multi lobar consolidation with most significant involvement of the  left lower lobe.     PMH:  Past Medical History:   Diagnosis Date    Anorexia     Anxiety     Depressive disorder, not elsewhere classified 4/9/2015    GERD (gastroesophageal reflux disease)     Loss of appetite  Pneumonia     7/20/15    Unspecified vitamin B deficiency 4/9/2015    Urinary tract infection, site not specified 4/9/2015       PSH:  Past Surgical History:   Procedure Laterality Date    HX COLONOSCOPY  last 2007    [place]    HX CYST INCISION AND DRAINAGE Right     breast    HX GI      anal sphincterotomy    HX LAP CHOLECYSTECTOMY  2005    HX TUBAL LIGATION  2001       Allergies: Allergies   Allergen Reactions    Flagyl [Metronidazole] Nausea and Vomiting    Zyprexa [Olanzapine] Other (comments)     Upset stomach       Home Medications:  Prior to Admission medications    Medication Sig Start Date End Date Taking? Authorizing Provider   cyanocobalamin (VITAMIN B12) 1,000 mcg/mL injection [place]/syringe-50#Dx b12 deficiency,,,DOSE 1 CC EVERY 2 WEEKS 1/2/18  Yes Kian Funez,    promethazine (PHENERGAN) 25 mg tablet Take 1 Tab by mouth every six (6) hours as needed for Nausea. 10/24/18   Kian Funez,    sucralfate (CARAFATE) 100 mg/mL suspension Take 5 mL by mouth four (4) times daily. 10/24/18   Kian Funez,    QUEtiapine (SEROQUEL) 100 mg tablet Take 2 Tabs by mouth nightly. 7/3/18   Kian Funez DO   pantoprazole (PROTONIX) 40 mg tablet Take 1 Tab by mouth daily. 7/3/18   Fara FRANCISCO DO   levothyroxine (SYNTHROID) 25 mcg tablet 1 a day empty stomach  Indications: hypothyroidism 7/3/18   Fara FRANCISCO DO   Cholecalciferol, Vitamin D3, (VITAMIN D3) 2,000 unit cap capsule Take 2,000 Units by mouth two (2) times a day. Indications: VITAMIN D DEFICIENCY 10/3/17   Cristino Weber DO       Hospital Medications:  Current Facility-Administered Medications   Medication Dose Route Frequency    potassium chloride (K-DUR, KLOR-CON) SR tablet 40 mEq  40 mEq Oral TID    dextrose 5 % - 0.45% NaCl 1,000 mL with potassium chloride 20 mEq, thiamine 100 mg, mvi, adult no. 4 with vit K 10 mL infusion   IntraVENous CONTINUOUS    azithromycin (ZITHROMAX) tablet 500 mg  500 mg Oral Q24H  levothyroxine (SYNTHROID) tablet 25 mcg  25 mcg Oral 6am    cholecalciferol (VITAMIN D3) tablet 2,000 Units  2,000 Units Oral DAILY    QUEtiapine (SEROquel) tablet 200 mg  200 mg Oral QHS    sucralfate (CARAFATE) 100 mg/mL oral suspension 0.5 g  0.5 g Oral AC&HS    promethazine (PHENERGAN) tablet 25 mg  25 mg Oral Q6H PRN    ondansetron (ZOFRAN) injection 4 mg  4 mg IntraVENous Q4H PRN    pantoprazole (PROTONIX) tablet 40 mg  40 mg Oral ACB    ampicillin-sulbactam (UNASYN) 3 g in 0.9% sodium chloride (MBP/ADV) 100 mL  3 g IntraVENous Q6H    sodium chloride (NS) flush 5-10 mL  5-10 mL IntraVENous Q8H    sodium chloride (NS) flush 5-10 mL  5-10 mL IntraVENous PRN    acetaminophen (TYLENOL) tablet 650 mg  650 mg Oral Q4H PRN    naloxone (NARCAN) injection 0.4 mg  0.4 mg IntraVENous PRN    albuterol (PROVENTIL VENTOLIN) nebulizer solution 2.5 mg  2.5 mg Nebulization Q4H PRN    bisacodyl (DULCOLAX) tablet 5 mg  5 mg Oral DAILY PRN    guaiFENesin ER (MUCINEX) tablet 600 mg  600 mg Oral Q12H    HYDROcodone-homatropine (HYCODAN) 5-1.5 mg/5 mL (5 mL) syrup 5 mL  5 mL Oral Q4H PRN       Social History:  Social History     Tobacco Use    Smoking status: Current Every Day Smoker     Packs/day: 1.00     Years: 17.00     Pack years: 17.00    Smokeless tobacco: Never Used   Substance Use Topics    Alcohol use: No       Pt denies any history of drug use, blood transfusions, or tattoos. Family History:  Family History   Problem Relation Age of Onset    Cancer Mother         breast    Breast Cancer Mother     No Known Problems Father        Review of Systems:  A detailed 10 system ROS is obtained, with pertinent positives as listed above. All others are negative.     Diet:  Regular    Objective:     Physical Exam:  Vitals:  Visit Vitals  /78 (BP Patient Position: At rest)   Pulse 88   Temp 98.9 °F (37.2 °C)   Resp 18   SpO2 96%     Gen:  Pt is alert, cooperative, no acute distress  Skin:  Extremities and face reveal no rashes. HEENT: Sclerae anicteric. Extra-occular muscles are intact. No oral ulcers. No abnormal pigmentation of the lips. The neck is supple. Cardiovascular: Regular rate and rhythm. No murmurs, gallops, or rubs. Respiratory:  Course BS bilat. GI:  Abdomen nondistended, soft. Mild ttp epigastrium and mid abd. Normal active bowel sounds. Rectal:  Deferred  Musculoskeletal:  No pitting edema of the lower legs. Neurological:  Gross memory appears intact. Patient is alert and oriented. Psychiatric:  Mood appears appropriate with judgement intact. Laboratory:    Recent Labs     11/01/18  0520 10/31/18  0616 10/30/18  0638 10/29/18  1645   WBC  --  16.4*  --  8.7   HGB  --  9.9*  --  11.6*   HCT  --  28.9*  --  33.6*   PLT  --  332  --  307   MCV  --  89.8  --  86.8    143 140 137   K 3.0* 3.0* 3.2* 2.6*    109* 107 99   CO2 25 22 25 25   BUN 3* 10 14 13   CREA 0.18* 0.30* 0.43* 0.53*   CA 6.4* 6.7* 6.9* 6.9*   MG  --  2.3  --  2.1   GLU 75 86 97 101*   AP  --  79  --  71   SGOT  --  50*  --  27   ALT  --  23  --  20   TBILI  --  0.5  --  1.3*   ALB  --  1.5*  --  2.0*   TP  --  5.3*  --  6.5          Assessment:     Principal Problem:    CAP (community acquired pneumonia) (10/29/2018)    Active Problems:    Esophageal reflux (4/9/2015)      Hypokalemia (10/29/2018)      Hemoptysis (10/29/2018)      Hypoxia (10/29/2018)      Tachycardia (10/29/2018)      Protein-calorie malnutrition, moderate ([address] (10/31/2018)      45 y.o. female with PMH of anorexia, GERD and impaired cognitive abilities on disability admitted for pneumonia that reports frequent N/V with meals and NSAID use. No reported melena/tarry stools. Patient on Unasyn and Azithromycin.            Plan:     - Continue Protonix, increase to BID  - Continue Carafate  - Supportive txt of nausea/vomiting   - Consider EGD to evaluate for GOO, PUD other causes of N/V when respiratory status will allow.   - NPO after midnight and re evaluate tomorrow AM       Ziyda Pineda, NP  Patient is seen and examined in collaboration with Dr. Amira Gaspar. Assessment and plan as per Dr. Bruce Vu.

## 2018-11-01 NOTE — PROGRESS NOTES
Pt was able to walk with PT this PM. No c/o pain at current time. Safety measures in place will continue to monitor.

## 2018-11-01 NOTE — PROGRESS NOTES
Patient slept periodically throughout the night. Patient awake in bed at this time. Respirations present. No signs of distress. Bed low and locked. Call light within reach. Bedside report given to oncgrant FORMAN, April.

## 2018-11-01 NOTE — PROGRESS NOTES
Patient request medication for cough. Patient received Hycodan 5 mL @ [phone]. Will continue to monitor.

## 2018-11-01 NOTE — PROGRESS NOTES
Pt is in room alert and oriented with some confusion noted. rr are even and unlabored. O2 in place 2L NC. Lung sounds are diminished. Pt has dyspnea on exertion productive cough noted. Pt has stated she had 20ml emesis this AM. She has stated she does not want anything at this time and she wants to rest. abd is semisoft bowel sounds are active. NS infusing. Family at bedside. Safety measures in place will continue to monitor.

## 2018-11-01 NOTE — H&P (VIEW-ONLY)
[facility] Consult Note Primary GI Physician: Dr Rehan Murray Referring Physician:   
 
Consult Date:  11/1/2018 Admit Date:  10/29/2018 Chief Complaint:  N/V Anorexia Subjective:  
 
History of Present Illness:  Patient is a 45 y.o. female with PMH of anorexia, GERD and impaired cognitive abilities on diablity, who is seen in consultation at the request of Dr. Ousmane Marx for above. She was admitted for SOB and cough. She has a hx of smoking but no hx of COPD or asthma. Chest Xray with pneumonia. WBC elevated to 16.4 yesterday. Patient on Unasyn and Azithromycin. CT negative for PE. She has a hx of eating disorder and Mother at bedside reports that she has cognitive disability and is on [facility] for this. Reports the patient only eats junk food and no vegetables and will not eat if she is in pain or uncomfortable. Reports intermitted N/V for years. ABD cramping has been an issue and many therapies tried by GYN. She was dx with \"gastritis by PCP 1 week ago. Takes Protonix 40 mg daily. Also takes Aleve several in a day. Denies melena. Diarrhea 2 days ago, none now. Seen in our office in 2012 for N/V and anorexia. Weight at that time was 72 pounds. Librax was continued and appetite stimulant was recommended. She reported EGD and [place] in 2005 for N/V, alternating D/C with SB bx negative. She is s/p abdulaziz in 2005 for cholelithiasis. She denies sig abd pain. CXR 11/1/18 IMPRESSION:  
1. Worsening basilar infiltrates and increasing now small to moderate left 
basilar effusions suggesting worsening pneumonia.  
  
 
CT Chest 10/29 IMPRESSION: 
1. No pulmonary embolism. 2. Extensive multi lobar consolidation with most significant involvement of the 
left lower lobe. PMH: 
Past Medical History:  
Diagnosis Date  Anorexia  Anxiety  Depressive disorder, not elsewhere classified 4/9/2015  GERD (gastroesophageal reflux disease)  Loss of appetite  Pneumonia 7/20/15  Unspecified vitamin B deficiency 4/9/2015  Urinary tract infection, site not specified 4/9/2015 PSH: 
Past Surgical History:  
Procedure Laterality Date  HX COLONOSCOPY  last 2007 Breonna Jumbo  HX CYST INCISION AND DRAINAGE Right   
 breast  
 HX GI    
 anal sphincterotomy  HX LAP CHOLECYSTECTOMY  2005  HX TUBAL LIGATION  2001 Allergies: Allergies Allergen Reactions  Flagyl [Metronidazole] Nausea and Vomiting  Zyprexa [Olanzapine] Other (comments) Upset stomach Home Medications: 
Prior to Admission medications Medication Sig Start Date End Date Taking? Authorizing Provider  
cyanocobalamin (VITAMIN B12) 1,000 mcg/mL injection [place]/syringe-50#Dx b12 deficiency,,,DOSE 1 CC EVERY 2 WEEKS 1/2/18  Yes Kian Esparza, DO  
promethazine (PHENERGAN) 25 mg tablet Take 1 Tab by mouth every six (6) hours as needed for Nausea. 10/24/18   Kian Esparza, DO  
sucralfate (CARAFATE) 100 mg/mL suspension Take 5 mL by mouth four (4) times daily. 10/24/18   Kian Esparza, DO  
QUEtiapine (SEROQUEL) 100 mg tablet Take 2 Tabs by mouth nightly. 7/3/18   Kian Esparza, DO  
pantoprazole (PROTONIX) 40 mg tablet Take 1 Tab by mouth daily. 7/3/18   Aparna FRANCISCO, DO  
levothyroxine (SYNTHROID) 25 mcg tablet 1 a day empty stomach  Indications: hypothyroidism 7/3/18   Aparna FRANCISCO, DO Cholecalciferol, Vitamin D3, (VITAMIN D3) 2,000 unit cap capsule Take 2,000 Units by mouth two (2) times a day. Indications: VITAMIN D DEFICIENCY 10/3/17   Aparna FRANCISCO, DO Hospital Medications: 
Current Facility-Administered Medications Medication Dose Route Frequency  potassium chloride (K-DUR, KLOR-CON) SR tablet 40 mEq  40 mEq Oral TID  dextrose 5 % - 0.45% NaCl 1,000 mL with potassium chloride 20 mEq, thiamine 100 mg, mvi, adult no. 4 with vit K 10 mL infusion   IntraVENous CONTINUOUS  
  azithromycin (ZITHROMAX) tablet 500 mg  500 mg Oral Q24H  
 levothyroxine (SYNTHROID) tablet 25 mcg  25 mcg Oral 6am  
 cholecalciferol (VITAMIN D3) tablet 2,000 Units  2,000 Units Oral DAILY  QUEtiapine (SEROquel) tablet 200 mg  200 mg Oral QHS  sucralfate (CARAFATE) 100 mg/mL oral suspension 0.5 g  0.5 g Oral AC&HS  promethazine (PHENERGAN) tablet 25 mg  25 mg Oral Q6H PRN  
 ondansetron (ZOFRAN) injection 4 mg  4 mg IntraVENous Q4H PRN  pantoprazole (PROTONIX) tablet 40 mg  40 mg Oral ACB  ampicillin-sulbactam (UNASYN) 3 g in 0.9% sodium chloride (MBP/ADV) 100 mL  3 g IntraVENous Q6H  
 sodium chloride (NS) flush 5-10 mL  5-10 mL IntraVENous Q8H  
 sodium chloride (NS) flush 5-10 mL  5-10 mL IntraVENous PRN  
 acetaminophen (TYLENOL) tablet 650 mg  650 mg Oral Q4H PRN  
 naloxone (NARCAN) injection 0.4 mg  0.4 mg IntraVENous PRN  
 albuterol (PROVENTIL VENTOLIN) nebulizer solution 2.5 mg  2.5 mg Nebulization Q4H PRN  
 bisacodyl (DULCOLAX) tablet 5 mg  5 mg Oral DAILY PRN  
 guaiFENesin ER (MUCINEX) tablet 600 mg  600 mg Oral Q12H  
 HYDROcodone-homatropine (HYCODAN) 5-1.5 mg/5 mL (5 mL) syrup 5 mL  5 mL Oral Q4H PRN Social History: 
Social History Tobacco Use  Smoking status: Current Every Day Smoker Packs/day: 1.00 Years: 17.00 Pack years: 17.00  Smokeless tobacco: Never Used Substance Use Topics  Alcohol use: No  
 
 
Pt denies any history of drug use, blood transfusions, or tattoos. Family History: 
Family History Problem Relation Age of Onset  Cancer Mother   
     breast  
 Breast Cancer Mother  No Known Problems Father Review of Systems: A detailed 10 system ROS is obtained, with pertinent positives as listed above. All others are negative. Diet:  Regular Objective:  
 
Physical Exam: 
Vitals: 
Visit Vitals /78 (BP Patient Position: At rest) Pulse 88 Temp 98.9 °F (37.2 °C) Resp 18 SpO2 96% Gen:  Pt is alert, cooperative, no acute distress Skin:  Extremities and face reveal no rashes. HEENT: Sclerae anicteric. Extra-occular muscles are intact. No oral ulcers. No abnormal pigmentation of the lips. The neck is supple. Cardiovascular: Regular rate and rhythm. No murmurs, gallops, or rubs. Respiratory:  Course BS bilat. GI:  Abdomen nondistended, soft. Mild ttp epigastrium and mid abd. Normal active bowel sounds. Rectal:  Deferred Musculoskeletal:  No pitting edema of the lower legs. Neurological:  Gross memory appears intact. Patient is alert and oriented. Psychiatric:  Mood appears appropriate with judgement intact. Laboratory:   
Recent Labs 11/01/18 
3726 10/31/18 
2700 10/30/18 
9363 10/29/18 
1645 WBC  --  16.4*  --  8.7 HGB  --  9.9*  --  11.6* HCT  --  28.9*  --  33.6* PLT  --  332  --  307 MCV  --  89.8  --  86.8  143 140 137  
K 3.0* 3.0* 3.2* 2.6*  
 109* 107 99 CO2 25 22 25 25 BUN 3* 10 14 13 CREA 0.18* 0.30* 0.43* 0.53* CA 6.4* 6.7* 6.9* 6.9*  
MG  --  2.3  --  2.1 GLU 75 86 97 101* AP  --  79  --  71 SGOT  --  50*  --  27 ALT  --  23  --  20  
TBILI  --  0.5  --  1.3* ALB  --  1.5*  --  2.0*  
TP  --  5.3*  --  6.5 Assessment:  
 
Principal Problem: 
  CAP (community acquired pneumonia) (10/29/2018) Active Problems: 
  Esophageal reflux (4/9/2015) Hypokalemia (10/29/2018) Hemoptysis (10/29/2018) Hypoxia (10/29/2018) Tachycardia (10/29/2018) Protein-calorie malnutrition, moderate ([address] (10/31/2018) 45 y.o. female with PMH of anorexia, GERD and impaired cognitive abilities on disability admitted for pneumonia that reports frequent N/V with meals and NSAID use. No reported melena/tarry stools. Patient on Unasyn and Azithromycin. Plan:  
 
- Continue Protonix, increase to BID 
- Continue Carafate - Supportive txt of nausea/vomiting - Consider EGD to evaluate for GOO, PUD other causes of N/V when respiratory status will allow.  
- NPO after midnight and re evaluate tomorrow AM  
 
 
Danita Hernandez NP Patient is seen and examined in collaboration with Dr. Ger Curran. Assessment and plan as per Dr. Sofie Alicia.

## 2018-11-02 LAB
ALBUMIN SERPL-MCNC: 1.5 G/DL (ref 3.5–5)
ALBUMIN/GLOB SERPL: 0.4 {RATIO} (ref 1.2–3.5)
ALP SERPL-CCNC: 103 U/L (ref 50–136)
ALT SERPL-CCNC: 20 U/L (ref 12–65)
ANION GAP SERPL CALC-SCNC: 6 MMOL/L (ref 7–16)
AST SERPL-CCNC: 37 U/L (ref 15–37)
BACTERIA SPEC CULT: NORMAL
BACTERIA SPEC CULT: NORMAL
BILIRUB SERPL-MCNC: 0.4 MG/DL (ref 0.2–1.1)
BUN SERPL-MCNC: 2 MG/DL (ref 6–23)
CALCIUM SERPL-MCNC: 6.6 MG/DL (ref 8.3–10.4)
CHLORIDE SERPL-SCNC: 108 MMOL/L (ref 98–107)
CO2 SERPL-SCNC: 28 MMOL/L (ref 21–32)
CREAT SERPL-MCNC: 0.26 MG/DL (ref 0.6–1)
ERYTHROCYTE [SEDIMENTATION RATE] IN BLOOD: 72 MM/HR (ref 0–20)
GLOBULIN SER CALC-MCNC: 3.6 G/DL (ref 2.3–3.5)
GLUCOSE SERPL-MCNC: 97 MG/DL (ref 65–100)
GRAM STN SPEC: NORMAL
POTASSIUM SERPL-SCNC: 3.6 MMOL/L (ref 3.5–5.1)
PROT SERPL-MCNC: 5.1 G/DL (ref 6.3–8.2)
SERVICE CMNT-IMP: NORMAL
SODIUM SERPL-SCNC: 142 MMOL/L (ref 136–145)
TROPONIN I SERPL-MCNC: <0.02 NG/ML (ref 0.02–0.05)
TSH SERPL DL<=0.005 MIU/L-ACNC: 7.15 UIU/ML (ref 0.36–3.74)

## 2018-11-02 PROCEDURE — 76604 US EXAM CHEST: CPT | Performed by: INTERNAL MEDICINE

## 2018-11-02 PROCEDURE — 74011250637 HC RX REV CODE- 250/637: Performed by: HOSPITALIST

## 2018-11-02 PROCEDURE — 77030011256 HC DRSG MEPILEX <16IN NO BORD MOLN -A

## 2018-11-02 PROCEDURE — 36415 COLL VENOUS BLD VENIPUNCTURE: CPT

## 2018-11-02 PROCEDURE — 84443 ASSAY THYROID STIM HORMONE: CPT

## 2018-11-02 PROCEDURE — 85652 RBC SED RATE AUTOMATED: CPT

## 2018-11-02 PROCEDURE — 74011250636 HC RX REV CODE- 250/636: Performed by: HOSPITALIST

## 2018-11-02 PROCEDURE — 74011250636 HC RX REV CODE- 250/636: Performed by: INTERNAL MEDICINE

## 2018-11-02 PROCEDURE — 97161 PT EVAL LOW COMPLEX 20 MIN: CPT

## 2018-11-02 PROCEDURE — 99232 SBSQ HOSP IP/OBS MODERATE 35: CPT | Performed by: INTERNAL MEDICINE

## 2018-11-02 PROCEDURE — 74011000258 HC RX REV CODE- 258: Performed by: INTERNAL MEDICINE

## 2018-11-02 PROCEDURE — 84484 ASSAY OF TROPONIN QUANT: CPT

## 2018-11-02 PROCEDURE — 74011000250 HC RX REV CODE- 250: Performed by: INTERNAL MEDICINE

## 2018-11-02 PROCEDURE — 97530 THERAPEUTIC ACTIVITIES: CPT

## 2018-11-02 PROCEDURE — 65270000029 HC RM PRIVATE

## 2018-11-02 PROCEDURE — 80053 COMPREHEN METABOLIC PANEL: CPT

## 2018-11-02 PROCEDURE — 85025 COMPLETE CBC W/AUTO DIFF WBC: CPT

## 2018-11-02 RX ORDER — LANSOPRAZOLE 30 MG/1
30 TABLET, ORALLY DISINTEGRATING, DELAYED RELEASE ORAL
Status: DISCONTINUED | OUTPATIENT
Start: 2018-11-02 | End: 2018-11-06 | Stop reason: HOSPADM

## 2018-11-02 RX ADMIN — GUAIFENESIN 600 MG: 600 TABLET, EXTENDED RELEASE ORAL at 09:26

## 2018-11-02 RX ADMIN — SUCRALFATE 0.5 G: 1 SUSPENSION ORAL at 05:27

## 2018-11-02 RX ADMIN — SUCRALFATE 0.5 G: 1 SUSPENSION ORAL at 21:56

## 2018-11-02 RX ADMIN — QUETIAPINE FUMARATE 200 MG: 100 TABLET ORAL at 21:56

## 2018-11-02 RX ADMIN — AMPICILLIN SODIUM AND SULBACTAM SODIUM 3 G: 2; 1 INJECTION, POWDER, FOR SOLUTION INTRAMUSCULAR; INTRAVENOUS at 20:32

## 2018-11-02 RX ADMIN — POTASSIUM CHLORIDE: 2 INJECTION, SOLUTION, CONCENTRATE INTRAVENOUS at 19:25

## 2018-11-02 RX ADMIN — ONDANSETRON 4 MG: 2 INJECTION INTRAMUSCULAR; INTRAVENOUS at 17:05

## 2018-11-02 RX ADMIN — SUCRALFATE 0.5 G: 1 SUSPENSION ORAL at 13:52

## 2018-11-02 RX ADMIN — Medication 10 ML: at 05:24

## 2018-11-02 RX ADMIN — Medication 10 ML: at 21:58

## 2018-11-02 RX ADMIN — PROMETHAZINE HYDROCHLORIDE 25 MG: 25 TABLET ORAL at 19:25

## 2018-11-02 RX ADMIN — Medication 10 ML: at 13:53

## 2018-11-02 RX ADMIN — AMPICILLIN SODIUM AND SULBACTAM SODIUM 3 G: 2; 1 INJECTION, POWDER, FOR SOLUTION INTRAMUSCULAR; INTRAVENOUS at 05:23

## 2018-11-02 RX ADMIN — SUCRALFATE 0.5 G: 1 SUSPENSION ORAL at 17:05

## 2018-11-02 RX ADMIN — AZITHROMYCIN 500 MG: 250 TABLET, FILM COATED ORAL at 17:04

## 2018-11-02 RX ADMIN — LEVOTHYROXINE SODIUM 25 MCG: 50 TABLET ORAL at 05:23

## 2018-11-02 RX ADMIN — AMPICILLIN SODIUM AND SULBACTAM SODIUM 3 G: 2; 1 INJECTION, POWDER, FOR SOLUTION INTRAMUSCULAR; INTRAVENOUS at 13:52

## 2018-11-02 RX ADMIN — VITAMIN D, TAB 1000IU (100/BT) 2000 UNITS: 25 TAB at 09:27

## 2018-11-02 RX ADMIN — LANSOPRAZOLE 30 MG: 30 TABLET, ORALLY DISINTEGRATING, DELAYED RELEASE ORAL at 05:23

## 2018-11-02 RX ADMIN — CALCIUM GLUCONATE 2 G: 98 INJECTION, SOLUTION INTRAVENOUS at 13:52

## 2018-11-02 NOTE — PROGRESS NOTES
Problem: Mobility Impaired (Adult and Pediatric) Goal: *Acute Goals and Plan of Care (Insert Text) STG: 
(1.)Ms. Yolis Melo will move from supine to sit and sit to supine  with SUPERVISION within 3 treatment day(s). (2.)Ms. Yolis Melo will transfer from bed to chair and chair to bed with STAND BY ASSIST using the least restrictive device within 3 treatment day(s). (3.)Ms. Yolis Melo will ambulate with STAND BY ASSIST for 100 feet with the least restrictive device within 3 treatment day(s). LTG: 
(1.)Ms. Yolis Melo will move from supine to sit and sit to supine  in bed with INDEPENDENT within 7 treatment day(s). (2.)Ms. Yolis Melo will transfer from bed to chair and chair to bed with INDEPENDENT using the least restrictive device within 7 treatment day(s). (3.)Ms. Yolis Melo will ambulate with INDEPENDENT for 200+ feet with the least restrictive device within 7 treatment day(s). ________________________________________________________________________________________________ PHYSICAL THERAPY: Initial Assessment, Treatment Day: Day of Assessment, AM 11/2/2018INPATIENT: Hospital Day: 5 Payor: ABSOLUTE TOTAL CARE [place] / Product Type: Managed Care Medicaid /  
  
NAME/AGE/GENDER: Maykel Zamora is a 45 y.o. female PRIMARY DIAGNOSIS: CAP (community acquired pneumonia) Hemoptysis Hypokalemia CAP (community acquired pneumonia) CAP (community acquired pneumonia) ICD-10: Treatment Diagnosis:  
 · Generalized Muscle Weakness (M62.81) · Difficulty in walking, Not elsewhere classified (R26.2) Precaution/Allergies: 
Flagyl [metronidazole] and Zyprexa [olanzapine] ASSESSMENT:  
Ms. Yolis Melo is supine in bed upon contact with father at bedside and agreeable to work with PT following max encouragement from both PT and parent. Pt lives with her  in 1 story home with 2 steps to enter. Pt is typically independent with gait and ADLs at baseline.  Pt also with cognitive deficits at baseline per father. Pt is currently on 2L O2 NC with O2 sat at 93% prior to mobility and does not require supplemental O2 at baseline. Pt very reluctant to any mobility this session. Pt transitioned supine to sit EOB with Sarah Beth. Pt performed STS with HHA and ambulated 10 ft with HHA to bathroom. Pt voided and performed toilet hygiene independently. Pt stood from commode with HHA. PT encouraged pt to ambulate in hallway or at least in room to further gait distance but pt adamantly refusing. With max encouragement and education, pt reluctantly agreeable to sit in bedside chair versus return to bed. Pt left sitting up with all needs met and within reach with father at bedside. Jake Rousseau will benefit from skilled PT (medically necessary) to address decreased strength, decreased balance, decreased functional tolerance, decreased cardiopulmonary endurance affecting participation in basic ADLs and functional tasks. This section established at most recent assessment PROBLEM LIST (Impairments causing functional limitations): 1. Decreased Strength 2. Decreased ADL/Functional Activities 3. Decreased Transfer Abilities 4. Decreased Ambulation Ability/Technique 5. Decreased Balance 6. Decreased Activity Tolerance 7. Decreased Pacing Skills 8. Increased Fatigue 9. Increased Shortness of Breath 10. Decreased Ada with Home Exercise Program 
11. Decreased Cognition INTERVENTIONS PLANNED: (Benefits and precautions of physical therapy have been discussed with the patient.) 1. Balance Exercise 2. Bed Mobility 3. Family Education 4. Gait Training 5. Home Exercise Program (HEP) 6. Neuromuscular Re-education/Strengthening 7. Therapeutic Activites 8. Therapeutic Exercise/Strengthening 9. Transfer Training TREATMENT PLAN: Frequency/Duration: 3 times a week for duration of hospital stay Rehabilitation Potential For Stated Goals: Fair RECOMMENDED REHABILITATION/EQUIPMENT: (at time of discharge pending progress): Due to the probability of continued deficits (see above) this patient will not likely need continued skilled physical therapy after discharge. Equipment:  
? None at this time HISTORY:  
History of Present Injury/Illness (Reason for Referral): 
See H&P below Patient is a 43 y/o female with hx GERD, anxiety, depression, hypothyroidism, anorexia who presents to ED with 1 week of progressive shortness of breath, cough and now hemoptysis. Was seen last week and thought she had gastritis and received medications for this. Saw her PCP today and was tachypneic, tachycardic with chest pain and hemoptysis. Then sent to ED to r/o PE or pneumonia. CT of chest negative for PE but she has extensive consolidation in left lower lobe and opacities in RLL, lingula and LLL. O2 sats were 87% on RA. She has no known lung disease such as asthma or COPD but does smoke. She received IV rocephin and azithromycin in ED. Hospitalist service consulted for admission and further treatment.  
  
 
 
Past Medical History/Comorbidities: Ms. Jonelle Barrow  has a past medical history of Anorexia, Anxiety, Depressive disorder, not elsewhere classified, GERD (gastroesophageal reflux disease), Loss of appetite, Pneumonia, Unspecified vitamin B deficiency, and Urinary tract infection, site not specified. Ms. Jonelle Barrow  has a past surgical history that includes hx lap cholecystectomy (2005); hx tubal ligation (2001); hx cyst incision and drainage (Right); hx colonoscopy (last 2007); hx gi; and PRONE LATERAL INTERNAL SPHINCTEROTOMY (N/A, 9/9/2015). Social History/Living Environment:  
Home Environment: Private residence # Steps to Enter: 2 One/Two Story Residence: One story Living Alone: No 
Support Systems: Spouse/Significant Other/Partner, Parent Patient Expects to be Discharged to[de-identified] Private residence Current DME Used/Available at Home: None Prior Level of Function/Work/Activity: 
Lives with , support of parents, indep with mobility Number of Personal Factors/Comorbidities that affect the Plan of Care: 3+: HIGH COMPLEXITY EXAMINATION:  
Most Recent Physical Functioning:  
Gross Assessment: 
Strength: Generally decreased, functional 
         
  
Posture: 
  
Balance: 
Sitting: Intact; Without support Standing: Intact; With support Bed Mobility: 
Supine to Sit: Minimum assistance Wheelchair Mobility: 
  
Transfers: 
Sit to Stand: Contact guard assistance Stand to Sit: Contact guard assistance Gait: 
  
Base of Support: Narrowed Speed/Twyla: Slow;Shuffled Step Length: Left shortened;Right shortened Gait Abnormalities: Decreased step clearance;Shuffling gait Distance (ft): 10 Feet (ft)(x2) 
Assistive Device: (HHA) Ambulation - Level of Assistance: Contact guard assistance Interventions: Safety awareness training; Tactile cues; Verbal cues Body Structures Involved: 1. Nerves 2. Heart 3. Lungs 4. Bones 5. Muscles Body Functions Affected: 1. Mental 
2. Cardio 3. Respiratory 4. Neuromusculoskeletal 
5. Movement Related Activities and Participation Affected: 1. Learning and Applying Knowledge 2. General Tasks and Demands 3. Mobility 4. Self Care 5. Domestic Life 6. Interpersonal Interactions and Relationships 7. Community, Social and [place] Number of elements that affect the Plan of Care: 4+: HIGH COMPLEXITY CLINICAL PRESENTATION:  
Presentation: Evolving clinical presentation with changing clinical characteristics: MODERATE COMPLEXITY CLINICAL DECISION MAKING:  
[employer] 6 Clicks Basic Mobility Inpatient Short Form How much difficulty does the patient currently have. .. Unable A Lot A Little None 1. Turning over in bed (including adjusting bedclothes, sheets and blankets)? [] 1   [] 2   [x] 3   [] 4  
2.   Sitting down on and standing up from a chair with arms ( e.g., wheelchair, bedside commode, etc.)   [] 1   [] 2   [x] 3   [] 4  
3. Moving from lying on back to sitting on the side of the bed? [] 1   [] 2   [x] 3   [] 4 How much help from another person does the patient currently need. .. Total A Lot A Little None 4. Moving to and from a bed to a chair (including a wheelchair)? [] 1   [] 2   [x] 3   [] 4  
5. Need to walk in hospital room? [] 1   [] 2   [x] 3   [] 4  
6. Climbing 3-5 steps with a railing? [] 1   [] 2   [x] 3   [] 4  
© 2007, [employer] of [employer], under license to [employer]. All rights reserved Score:  Initial: 18 Most Recent: X (Date: -- ) Interpretation of Tool:  Represents activities that are increasingly more difficult (i.e. Bed mobility, Transfers, Gait). Score 24 23 22-20 19-15 14-10 9-7 6 Modifier CH CI CJ CK CL CM CN   
 
? Mobility - Walking and Moving Around:  
  - CURRENT STATUS: CK - 40%-59% impaired, limited or restricted  - GOAL STATUS: CJ - 20%-39% impaired, limited or restricted  - D/C STATUS:  ---------------To be determined--------------- Payor: ABSOLUTE TOTAL CARE / [place] / Product Type: Managed Care Medicaid /   
 
Medical Necessity:    
· Patient is expected to demonstrate progress in strength, balance, coordination and functional technique to decrease assistance required with gait, transfers, and functional mobility. Lupe Carvalho Reason for Services/Other Comments: 
· Patient continues to require skilled intervention due to decreased strength, decreased balance, decreased functional tolerance, decreased cardiopulmonary endurance affecting participation in basic ADLs and functional tasks. Use of outcome tool(s) and clinical judgement create a POC that gives a: Clear prediction of patient's progress: LOW COMPLEXITY  
  
 
 
 
TREATMENT:  
(In addition to Assessment/Re-Assessment sessions the following treatments were rendered) Pre-treatment Symptoms/Complaints:  none Pain: Initial:  
Pain Intensity 1: 0  Post Session:  0/10 In addition to evaluation: 
Therapeutic Activity: (    8 minutes): Therapeutic activities including Bed transfers, Toilet transfers, Ambulation on level ground and cues for ease and safety of transfers, pacing techniques, energy conservation to improve mobility, strength, balance and coordination. Required minimal Safety awareness training; Tactile cues; Verbal cues to promote static and dynamic balance in standing and promote coordination of bilateral, lower extremity(s). Braces/Orthotics/Lines/Etc:  
· O2 Device: Nasal cannula Treatment/Session Assessment:   
· Response to Treatment:  Ambulated 10 ft X 2 with HHA · Interdisciplinary Collaboration:  
o Physical Therapist 
o Registered Nurse · After treatment position/precautions:  
o Up in chair 
o Bed/Chair-wheels locked 
o Bed in low position 
o Call light within reach 
o Family at bedside · Compliance with Program/Exercises: Will assess as treatment progresses · Recommendations/Intent for next treatment session: \"Next visit will focus on advancements to more challenging activities and reduction in assistance provided\". Total Treatment Duration: PT Patient Time In/Time Out Time In: 7606 Time Out: 1056 Lindsey Tejeda

## 2018-11-02 NOTE — PROGRESS NOTES
SBAR shift report received from DASIA Garcia. Pt stable. Assessment complete. Pt is lying in bed, watching tv. Resp even, unlabored. Pt is alert, orient X 3. Pt appears in no acute distress at this time. Pt is on 2L nasal cannula 02. Pt has banana bag infusing @ 100mL/hr. Pt c/o nausea, given phenergan. Pt has SCD's in place. Pt encouraged to call for assistance, call light in reach. Safety measures in place. Will continue to monitor

## 2018-11-02 NOTE — PROGRESS NOTES
Problem: Interdisciplinary Rounds Goal: Interdisciplinary Rounds Interdisciplinary team rounds were held 11/2/2018 with the following team members:Care Management, Physical Therapy, Physician and Clinical Coordinator. Plan of care discussed. See clinical pathway and/or care plan for interventions and desired outcomes.

## 2018-11-02 NOTE — PROGRESS NOTES
Pt is in room alert and oriented. rr are even and unlabored some dyspnea noted on exertion. Lung sounds are course. O2 in place 2L NC. She was able to sit in chair this AM for about 45 minutes. She states that was all she could do because she feels weak. No c/o pain or nausea this AM. Family at bedside. Wound to rt elbow was cleaned and bandage applied. She is stable safety measures in place will continue to monitor.

## 2018-11-02 NOTE — PROGRESS NOTES
11/2/2018 Admit Date: 10/29/2018 Subjective: CHIEF COMPLAINT- pneumonia HPI Overall- about the same- poor po intake Diet-reg Appetite-poor Nausea-min Vomiting-no Pain-mild BM-yes Bleeding-no Medications-reviewed and adjusted as appropriate IV FLUIDS-reviewed FAM HX-per H&P SH-per H&P 
 tob-yes 
          etoh-no Past Medical History:  
Diagnosis Date  Anorexia  Anxiety  Depressive disorder, not elsewhere classified 4/9/2015  GERD (gastroesophageal reflux disease)  Loss of appetite  Pneumonia 7/20/15  Unspecified vitamin B deficiency 4/9/2015  Urinary tract infection, site not specified 4/9/2015 Past Surgical History:  
Procedure Laterality Date  HX COLONOSCOPY  last 2007 Angie Landin  HX CYST INCISION AND DRAINAGE Right   
 breast  
 HX GI    
 anal sphincterotomy  HX LAP CHOLECYSTECTOMY  2005  HX TUBAL LIGATION  2001 ROS-- 
               RESP-pneumonia CARDIAC-neg -neg Further ROS as per PMH and PSH- see problem list     
                   
 
Objective:  
 
Visit Vitals /69 (BP 1 Location: Left arm, BP Patient Position: At rest) Pulse (!) 101 Temp 99.4 °F (37.4 °C) Resp 19 SpO2 93% Intake/Output Summary (Last 24 hours) at 11/2/2018 0629 Last data filed at 11/1/2018 1850 Gross per 24 hour Intake 720 ml Output 1000 ml Net -280 ml EXAM:   
 NEURO-a&o HEENT-wnl LUNGS-decreased breath sounds Rita Blackbird ABD-soft , min tenderness, no rebound, good bs EXT-no edema LABS- 
Lab Results Component Value Date/Time WBC 14.1 (H) 11/02/2018 05:16 AM  
 RBC 3.22 (L) 11/02/2018 05:16 AM  
 HGB 9.7 (L) 11/02/2018 05:16 AM  
 HCT 28.8 (L) 11/02/2018 05:16 AM  
 PLATELET 049 89/58/6413 05:16 AM  
 
Lab Results Component Value Date/Time Sodium 142 11/02/2018 05:16 AM  
 Potassium 3.6 11/02/2018 05:16 AM  
 Chloride 108 (H) 11/02/2018 05:16 AM  
 CO2 28 11/02/2018 05:16 AM  
 Anion gap 6 (L) 11/02/2018 05:16 AM  
 Glucose 97 11/02/2018 05:16 AM  
 BUN 2 (L) 11/02/2018 05:16 AM  
 Creatinine 0.26 (L) 11/02/2018 05:16 AM  
 GFR est AA >60 11/02/2018 05:16 AM  
 GFR est non-AA >60 11/02/2018 05:16 AM  
 Calcium 6.6 (L) 11/02/2018 05:16 AM  
 Magnesium 2.3 10/31/2018 06:16 AM  
 Albumin 1.5 (L) 11/02/2018 05:16 AM  
 Bilirubin, total 0.4 11/02/2018 05:16 AM  
 Protein, total 5.1 (L) 11/02/2018 05:16 AM  
 Globulin 3.6 (H) 11/02/2018 05:16 AM  
 A-G Ratio 0.4 (L) 11/02/2018 05:16 AM  
 AST (SGOT) 37 11/02/2018 05:16 AM  
 ALT (SGPT) 20 11/02/2018 05:16 AM  
 
 
   
Assessment:  
 
Principal Problem: 
  CAP (community acquired pneumonia) (10/29/2018) Active Problems: 
  Esophageal reflux (4/9/2015) Hypokalemia (10/29/2018) Hemoptysis (10/29/2018) Hypoxia (10/29/2018) Tachycardia (10/29/2018) Protein-calorie malnutrition, moderate ([address] (10/31/2018) 
 
main problem is underlying eating disorder and malnutrition A trial of reglan may help with her gerd and po intake but she is on Seroquel and there is a high risk of interaction between reglan and antipsychotics with irreversible tardive dyskinesia so this is not an option The only other prokinetic is Motilin but is only available from [facility] Discussed with Dr. Erinn Shearer Will delay egd til pulmonary status improves Plan:  
 
EGD next week Cont reg diet PT SEEN AND EXAMINED AND PLAN DISCUSSED AND IMPLEMENTED.  
Smitha Bhardwaj MD

## 2018-11-02 NOTE — PROGRESS NOTES
Bilateral Ultra Sound done of the chest, picture taken and placed on chart, not enough fluid to perform Thoracentesis.

## 2018-11-02 NOTE — PROGRESS NOTES
Pt has been encouraged to ambulate more. She still refuses. She stated she is SOB and it scares her. She is on 2L NC. Lung sounds are course no distress noted. Dressing to rt elbow in place. Family at bedside. Safety measures in place will continue to monitor.

## 2018-11-02 NOTE — PROGRESS NOTES
Hospitalist Progress Note Admit Date:  10/29/2018  6:38 PM  
Name:  Margarette Luis Age:  45 y.o. 
:  1980 MRN:  [id number] PCP:  Juliana Lieberman DO Treatment Team: Attending Provider: Janee Virgen MD; Consulting Provider: Fredi Claros MD; Care Manager: Pieter Sanchez, DASIA; Utilization Review: Merissa Boone; Consulting Provider: Wayne Saxena RN; Consulting Provider: Germán Da Silva MD 
 
Subjective:  
 
  
From h and p  HPI: 
Patient is a 45 y/o female with hx GERD, anxiety, depression, hypothyroidism, anorexia who presents to ED with 1 week of progressive shortness of breath, cough and now hemoptysis. Was seen last week and thought she had gastritis and received medications for this. Saw her PCP today and was tachypneic, tachycardic with chest pain and hemoptysis. Then sent to ED to r/o PE or pneumonia. CT of chest negative for PE but she has extensive consolidation in left lower lobe and opacities in RLL, lingula and LLL. O2 sats were 87% on RA. She has no known lung disease such as asthma or COPD but does smoke. She received IV rocephin and azithromycin in ED. Hospitalist service consulted for admission and further treatment. Today: 
Finally feeling better but still not eating enough. Corrected calcium level discussed. She does not like to be weighed, fear of scales, restrictive avoidant food behaviors and refusal to accept eating disorder diagnosis is predictive of anorexia. Denies purging(vomitting) behavior. Aware may require tpn. Parents minimizing nutritional status. Weotilia Body has always been a very picky eater\".   
 
 
 
 
 
Objective:  
 
Patient Vitals for the past 24 hrs: 
 Temp Pulse Resp BP SpO2  
18 1102 98.6 °F (37 °C) (!) 108 20 103/71 91 % 18 0725 99.4 °F (37.4 °C) (!) 101 19 105/69 93 % 18 0333 98.1 °F (36.7 °C) 86 18 104/69 91 % 18 2326 98.5 °F (36.9 °C) 88 20 107/70 92 % 11/01/18 2009 97.8 °F (36.6 °C) 91 18 99/63 96 % 11/01/18 1555 97.2 °F (36.2 °C) 86 18 113/80 95 % 11/01/18 1529     96 % 11/01/18 1221 98.9 °F (37.2 °C) 88 18 124/78 96 % Oxygen Therapy O2 Sat (%): 91 % (11/02/18 1102) Pulse via Oximetry: 89 beats per minute (10/31/18 1926) O2 Device: Nasal cannula (11/01/18 1529) O2 Flow Rate (L/min): 2 l/min (11/01/18 1529) FIO2 (%): 28 % (10/31/18 1926) ETCO2 (mmHg): 3 mmHg (10/29/18 1906) Intake/Output Summary (Last 24 hours) at 11/2/2018 1132 Last data filed at 11/2/2018 4053 Gross per 24 hour Intake 480 ml Output 1100 ml Net -620 ml Physical Examination: 
General:    More cooperative Head:  Normocephalic, atraumatic Eyes:  Extraocular movements intact, normal sclera CV:   Tachycardia improving, no gross gallop or murmurs, no exam findings to suggest pericardial effusion Lungs:   Rhonchi decreased, pleuritic type vs m.s. Pain improving Abdomen:   Soft, nondistended, nontender. Extremities: Lanugo hair pattern and dry skin Neuro:  No gross focal deficits, chronic cognitive impairment Psych:  anorexia Data Review: 
I have reviewed all labs, meds, telemetry events, and studies from the last 24 hours. Recent Results (from the past 24 hour(s)) TROPONIN I Collection Time: 11/01/18  6:14 PM  
Result Value Ref Range Troponin-I, Qt. <0.02 (L) 0.02 - 0.05 NG/ML  
TROPONIN I Collection Time: 11/02/18  5:16 AM  
Result Value Ref Range Troponin-I, Qt. <0.02 (L) 0.02 - 0.05 NG/ML  
CBC WITH AUTOMATED DIFF Collection Time: 11/02/18  5:16 AM  
Result Value Ref Range WBC 14.1 (H) 4.3 - 11.1 K/uL  
 RBC 3.22 (L) 4.05 - 5.2 M/uL HGB 9.7 (L) 11.7 - 15.4 g/dL HCT 28.8 (L) 35.8 - 46.3 % MCV 89.4 79.6 - 97.8 FL  
 MCH 30.1 26.1 - 32.9 PG  
 MCHC 33.7 31.4 - 35.0 g/dL  
 RDW 13.6 % PLATELET 199 994 - 897 K/uL MPV 9.6 9.4 - 12.3 FL ABSOLUTE NRBC 0.00 0.0 - 0.2 K/uL  
 DF MANUAL ABS. NEUTROPHILS 12.2 (H) 1.7 - 8.2 K/UL  
 ABS. LYMPHOCYTES 1.6 0.5 - 4.6 K/UL  
 ABS. MONOCYTES 0.3 0.1 - 1.3 K/UL  
 NEUTROPHILS 81 (H) 47 - 75 % LYMPHOCYTES 11 (L) 16 - 44 % MONOCYTES 2 (L) 3 - 9 % METAMYELOCYTES 2 % MYELOCYTES 4 % RBC COMMENTS SLIGHT 
ANISOCYTOSIS + POIKILOCYTOSIS 
    
 WBC COMMENTS Result Confirmed By Smear PLATELET COMMENTS LARGE FORMS PRESENT    
SED RATE, AUTOMATED Collection Time: 11/02/18  5:16 AM  
Result Value Ref Range Sed rate, automated 72 (H) 0 - 20 mm/hr TSH 3RD GENERATION Collection Time: 11/02/18  5:16 AM  
Result Value Ref Range TSH 7.150 (H) 0.358 - 3.740 uIU/mL METABOLIC PANEL, COMPREHENSIVE Collection Time: 11/02/18  5:16 AM  
Result Value Ref Range Sodium 142 136 - 145 mmol/L Potassium 3.6 3.5 - 5.1 mmol/L Chloride 108 (H) 98 - 107 mmol/L  
 CO2 28 21 - 32 mmol/L Anion gap 6 (L) 7 - 16 mmol/L Glucose 97 65 - 100 mg/dL BUN 2 (L) 6 - 23 MG/DL Creatinine 0.26 (L) 0.6 - 1.0 MG/DL  
 GFR est AA >60 >60 ml/min/1.73m2 GFR est non-AA >60 >60 ml/min/1.73m2 Calcium 6.6 (L) 8.3 - 10.4 MG/DL Bilirubin, total 0.4 0.2 - 1.1 MG/DL  
 ALT (SGPT) 20 12 - 65 U/L  
 AST (SGOT) 37 15 - 37 U/L Alk. phosphatase 103 50 - 136 U/L Protein, total 5.1 (L) 6.3 - 8.2 g/dL Albumin 1.5 (L) 3.5 - 5.0 g/dL Globulin 3.6 (H) 2.3 - 3.5 g/dL A-G Ratio 0.4 (L) 1.2 - 3.5 All Micro Results Procedure Component Value Units Date/Time CULTURE, RESPIRATORY/SPUTUM/BRONCH Dangelo [place] [[id number]] Collected:  10/30/18 1827 Order Status:  Completed Specimen:  Sputum Updated:  11/02/18 5169 Special Requests: NO SPECIAL REQUESTS     
  GRAM STAIN NO DEFINITE ORGANISM SEEN     
   22 TO 47 WBC'S SEEN PER OIF  
   NO EPITHELIAL CELLS SEEN     
   4+ MUCUS PRESENT Culture result: SCANT YEAST SCANT NORMAL RESPIRATORY ALF DARRICK Tony, UR/CSF [[id number]] Collected:  10/29/18 1927 Order Status:  Completed Specimen:  Other from Miscellaneous sample Updated:  11/01/18 1438 Source URINE Specimen Urine Streptococcus pneumoniae Ag NEGATIVE Fluid culture Not Indicated Organism ID Not indicated. Please note Comment Comment: (NOTE) College [employer] standards require a culture to be 
performed on CSF specimens submitted for bacterial antigen testing. (CAP [id number]) Urine specimens will not be cultured. Performed At: [address] [id number] Caden Griffin MD [id number] CULTURE, BLOOD [[id number]] Collected:  10/30/18 1352 Order Status:  Completed Specimen:  Blood Updated:  11/01/18 5917 Special Requests: --     
  RIGHT Antecubital 
  
  Culture result: NO GROWTH 2 DAYS     
 CULTURE, BLOOD [[id number]] Collected:  10/30/18 1402 Order Status:  Completed Specimen:  Blood Updated:  11/01/18 5388 Special Requests: --     
  LEFT 
HAND Culture result: NO GROWTH 2 DAYS     
 AFB CULTURE + SMEAR W/RFLX ID FROM CULTURE [[id number]] Order Status:  Sent FUNGUS CULTURE AND SMEAR [[id number]] Order Status:  Sent Specimen:  Other Current Meds: 
Current Facility-Administered Medications Medication Dose Route Frequency  lansoprazole (PREVACID SOLUTAB) disintegrating tablet 30 mg  30 mg Oral ACB  calcium gluconate 2 g in 0.9% sodium chloride 100 mL IVPB  2 g IntraVENous ONCE  
 dextrose 5 % - 0.45% NaCl 1,000 mL with potassium chloride 20 mEq, thiamine 100 mg, mvi, adult no. 4 with vit K 10 mL infusion   IntraVENous CONTINUOUS  
 azithromycin (ZITHROMAX) tablet 500 mg  500 mg Oral Q24H  
 levothyroxine (SYNTHROID) tablet 25 mcg  25 mcg Oral 6am  
 cholecalciferol (VITAMIN D3) tablet 2,000 Units  2,000 Units Oral DAILY  QUEtiapine (SEROquel) tablet 200 mg  200 mg Oral QHS  sucralfate (CARAFATE) 100 mg/mL oral suspension 0.5 g  0.5 g Oral AC&HS  
  promethazine (PHENERGAN) tablet 25 mg  25 mg Oral Q6H PRN  
 ondansetron (ZOFRAN) injection 4 mg  4 mg IntraVENous Q4H PRN  
 ampicillin-sulbactam (UNASYN) 3 g in 0.9% sodium chloride (MBP/ADV) 100 mL  3 g IntraVENous Q6H  
 sodium chloride (NS) flush 5-10 mL  5-10 mL IntraVENous Q8H  
 sodium chloride (NS) flush 5-10 mL  5-10 mL IntraVENous PRN  
 acetaminophen (TYLENOL) tablet 650 mg  650 mg Oral Q4H PRN  
 naloxone (NARCAN) injection 0.4 mg  0.4 mg IntraVENous PRN  
 albuterol (PROVENTIL VENTOLIN) nebulizer solution 2.5 mg  2.5 mg Nebulization Q4H PRN  
 bisacodyl (DULCOLAX) tablet 5 mg  5 mg Oral DAILY PRN  
 guaiFENesin ER (MUCINEX) tablet 600 mg  600 mg Oral Q12H  
 HYDROcodone-homatropine (HYCODAN) 5-1.5 mg/5 mL (5 mL) syrup 5 mL  5 mL Oral Q4H PRN Diet: DIET NUTRITIONAL SUPPLEMENTS 
DIET REGULAR Other Studies (last 24 hours): Xr Chest Pa Lat Result Date: 11/1/2018 CHEST X-RAY, 2 views 11/1/2018 History: Cough, weakness, and pneumonia. Follow-up. Technique: PA and lateral views of the chest. Comparison: Chest x-ray 10/29/2018 Findings: The cardiac silhouette is nonenlarged. Lungs are expanded without pneumothorax. Worsening consolidations are seen in the left mid and bilateral lower lung fields. In addition, there is an increasing now small to moderate left pleural effusion. The patient appears to be status post cholecystectomy. IMPRESSION: 1. Worsening basilar infiltrates and increasing now small to moderate left basilar effusions suggesting worsening pneumonia. Assessment and Plan:  
 
Hospital Problems as of 11/2/2018 Date Reviewed: 11/1/2018 Codes Class Noted - Resolved POA Protein-calorie malnutrition, moderate (HCC) ICD-10-CM: E44.0 ICD-9-CM: 263.0  10/31/2018 - Present Unknown Hypokalemia ICD-10-CM: E87.6 ICD-9-CM: 276.8  10/29/2018 - Present Yes * (Principal) CAP (community acquired pneumonia) ICD-10-CM: J18.9 ICD-9-CM: 319  10/29/2018 - Present Yes Hemoptysis ICD-10-CM: R04.2 ICD-9-CM: 786.30  10/29/2018 - Present Yes Hypoxia ICD-10-CM: R09.02 
ICD-9-CM: 799.02  10/29/2018 - Present Yes Tachycardia ICD-10-CM: R00.0 ICD-9-CM: 785.0  10/29/2018 - Present Yes Anorexia nervosa ICD-10-CM: F50.00 ICD-9-CM: 307.1  4/9/2015 - Present Esophageal reflux ICD-10-CM: K21.9 ICD-9-CM: 530.81  4/9/2015 - Present Yes A/P:   
  CAP (community acquired pneumonia) (10/29/2018)- Starting to feel some better, still not eating. Appreciate pulm. recommendations/mgmt.  
  
Active Problems: 
  Anorexia nervosa (4/9/2015) -- Possible associated gastroparesis but no safe motility agent given potential med interactions 
 
  
Moderate protein calorie malnutrition -- check orthostatics, Hypocalcemia--check vit d, corrects to 7.1--iv calcium gluconoate consider ppn vs dobhoff She claims she will try to eat more today.  
  
  Esophageal reflux (4/9/2015)--now ppi, carafate-gi consult as above--prudent to delay any sedation egd until pneumonia improved furthrer. 
  
  Hypokalemia (10/29/2018)--supplement to k 4--continue monitor 
  
  Hypoxia (10/29/2018)--wean oxygen as able --slow improvement. DC planning/Dispo:  Eventual home if heals, need outpt eval. Eating disorder Code status:full

## 2018-11-02 NOTE — PROCEDURES
PROCEDURE:  DIAGNOSTIC ULTRASOUND OF CHEST    DIAGNOSIS:  LEFTPLEURAL EFFUSION    CHEST ULTRASOUND FINDINGS:    A [employer] ([employer]) ultrasound was used to image the chest and localize the pleural effusion on the left chest. No significant effusion on right chest.    A small anechoic space was seen on the left consistent with an uncomplicated pleural effusion. IMAGE: in chart    Too small to sample; will monitor clinically.  Overall improving      Josue Portillo MD

## 2018-11-02 NOTE — PROGRESS NOTES
Bianca Kohler Admission Date: 10/29/2018 Daily Progress Note: 11/2/2018 The patient's chart is reviewed and the patient is discussed with the staff. 
 
45 y.o. CF evaluated at the request of Dr. Stephanie Conrad. Has mental delay with reported 2nd grade education equivalent but is reported to be independent, h/o smoking-quit 1 year ago and has been vaping for the past year. H/o pneumonia in 2015, denies h/o asthma or COPD. About 4 days ago began to feel poorly with subjective fever, cough and shortness of breath. Was admitted and CXR and CT showing pneumonia L>R bases. She reported an episode of scant hemoptysis but not produced much over the past 16 hours. She required O2 and has been weaned to 10L HFNC at this time. She reports history of GERD and previously was on PPI. Has HOB elevated and since doing has less reflux symptoms. Has had aspiration episodes in past with reflux, but no overt pneumonia and none recently. Stays at home all day, doesn't work, no infectious contacts.  perhaps had a viral GI infection past week. Subjective:  
 
Feeling slightly better. Did eat some yesterday and this morning. Mild central chest discomfort, none on left flank/chest. O2 requirement down to 2LPM. Overall does state feels better. Doesn't want to work with PT as she gets lightheaded with walking. Current Facility-Administered Medications Medication Dose Route Frequency  lansoprazole (PREVACID SOLUTAB) disintegrating tablet 30 mg  30 mg Oral ACB  dextrose 5 % - 0.45% NaCl 1,000 mL with potassium chloride 20 mEq, thiamine 100 mg, mvi, adult no. 4 with vit K 10 mL infusion   IntraVENous CONTINUOUS  
 azithromycin (ZITHROMAX) tablet 500 mg  500 mg Oral Q24H  
 levothyroxine (SYNTHROID) tablet 25 mcg  25 mcg Oral 6am  
 cholecalciferol (VITAMIN D3) tablet 2,000 Units  2,000 Units Oral DAILY  QUEtiapine (SEROquel) tablet 200 mg  200 mg Oral QHS  sucralfate (CARAFATE) 100 mg/mL oral suspension 0.5 g  0.5 g Oral AC&HS  promethazine (PHENERGAN) tablet 25 mg  25 mg Oral Q6H PRN  
 ondansetron (ZOFRAN) injection 4 mg  4 mg IntraVENous Q4H PRN  
 ampicillin-sulbactam (UNASYN) 3 g in 0.9% sodium chloride (MBP/ADV) 100 mL  3 g IntraVENous Q6H  
 sodium chloride (NS) flush 5-10 mL  5-10 mL IntraVENous Q8H  
 sodium chloride (NS) flush 5-10 mL  5-10 mL IntraVENous PRN  
 acetaminophen (TYLENOL) tablet 650 mg  650 mg Oral Q4H PRN  
 naloxone (NARCAN) injection 0.4 mg  0.4 mg IntraVENous PRN  
 albuterol (PROVENTIL VENTOLIN) nebulizer solution 2.5 mg  2.5 mg Nebulization Q4H PRN  
 bisacodyl (DULCOLAX) tablet 5 mg  5 mg Oral DAILY PRN  
 guaiFENesin ER (MUCINEX) tablet 600 mg  600 mg Oral Q12H  
 HYDROcodone-homatropine (HYCODAN) 5-1.5 mg/5 mL (5 mL) syrup 5 mL  5 mL Oral Q4H PRN Review of Systems Constitutional: negative for fever, chills, sweats Cardiovascular: negative for chest pain, palpitations, syncope, edema Gastrointestinal:  Reports vomiting and ongoing nausea, negative for dysphagia, reflux, diarrhea, abdominal pain, or melena Neurologic:  negative for focal weakness, numbness, headache Objective:  
 
Vitals:  
 11/01/18 2009 11/01/18 2326 11/02/18 0333 11/02/18 0725 BP: 99/63 107/70 104/69 105/69 Pulse: 91 88 86 (!) 101 Resp: 18 20 18 19 Temp: 97.8 °F (36.6 °C) 98.5 °F (36.9 °C) 98.1 °F (36.7 °C) 99.4 °F (37.4 °C) SpO2: 96% 92% 91% 93% Intake and Output:  
10/31 1901 - 11/02 0700 In: 2313 [P.O.:720; I.V.:1593] Out: 1200 [Urine:1200] 11/02 0701 - 11/02 1900 In: 0 Out: 300 [Urine:300] Physical Exam:  
Constitution:  the patient is thin and in no acute distress, NC 2L sat 97% EENMT:  Sclera clear, pupils equal, oral mucosa moist 
Respiratory: few scattered crackles, L>R, productive cough at times Cardiovascular:  RRR without M,G,R 
Gastrointestinal: soft and non-tender; with positive bowel sounds. Musculoskeletal: warm without cyanosis. There is no lower leg edema. Skin:  no jaundice or rashes, no wounds Neurologic: no gross neuro deficits Psychiatric:  alert and oriented x 3 CXR: 11/1: increased left infiltrate vs more likely effusion Chest CT 10/29/18:   
1. No pulmonary embolism. 2. Extensive multi lobar consolidation with most significant involvement of the left lower lobe CXR 10/29/18:   
 
 
LAB No results for input(s): GLUCPOC in the last 72 hours. No lab exists for component: [place] Recent Labs 11/02/18 
9982 10/31/18 
9013 WBC 14.1* 16.4* HGB 9.7* 9.9*  
HCT 28.8* 28.9*  
 332 Recent Labs 11/02/18 
0516 11/01/18 2629 11/01/18 
1023 11/01/18 
0520 10/31/18 
4180   --   --  138 143  
K 3.6  --   --  3.0* 3.0*  
*  --   --  102 109* CO2 28  --   --  25 22 GLU 97  --   --  75 86 BUN 2*  --   --  3* 10  
CREA 0.26*  --   --  0.18* 0.30* MG  --   --   --   --  2.3 CA 6.6*  --   --  6.4* 6.7*  
TROIQ <0.02* <0.02* <0.02*  --   --   
ALB 1.5*  --   --   --  1.5* TBILI 0.4  --   --   --  0.5 ALT 20  --   --   --  23 SGOT 37  --   --   --  50* Assessment:  (Medical Decision Making) Hospital Problems  Date Reviewed: 11/1/2018 Codes Class Noted POA Hypokalemia ICD-10-CM: E87.6 ICD-9-CM: 276.8  10/29/2018 Yes  
 supplementing --follow up labs in AM  
 * (Principal) CAP (community acquired pneumonia) ICD-10-CM: J18.9 ICD-9-CM: 708  10/29/2018 Yes  
 continue antibiotics Hemoptysis ICD-10-CM: R04.2 ICD-9-CM: 786.30  10/29/2018 Yes  
 resolved Hypoxia ICD-10-CM: R09.02 
ICD-9-CM: 799.02  10/29/2018 Yes  
 remains on NC Tachycardia ICD-10-CM: R00.0 ICD-9-CM: 785.0  10/29/2018 Yes Resolved--HR 80-90s Anorexia nervosa ICD-10-CM: F50.00 ICD-9-CM: 307.1  4/9/2015 Yes  
 chronic Esophageal reflux ICD-10-CM: K21.9 ICD-9-CM: 530.81  4/9/2015 Yes Chronic--would benefit from GI when improved Plan:  (Medical Decision Making) --Unasyn day 4, Zithromax day 5--stop Zithro after today --Blood cultures:  Pending, NGTD 
--Respiratory culture:  Pending, NGTD 
--WBC down to 14 
--ARGELIA:  negative, scleroderma antibody: negative- 0.2  
--Strep pneumo antigen:  negative 
--K+ low 3.0--supplementing 
--GI following for nausea and poor PO tolerance 
--PT for mobility, encouraged to work with PT 
--Will U/S chest today for possible thoracentesis for likely parapneumonic effusion 
--wean O2 to off as tolerated, overall appears improving from pneumonia More than 50% of the time documented was spent in face-to-face contact with the patient and in the care of the patient on the floor/unit where the patient is located.  
 
Edgar Urbano MD

## 2018-11-03 LAB
ANION GAP SERPL CALC-SCNC: 6 MMOL/L (ref 7–16)
BUN SERPL-MCNC: 2 MG/DL (ref 6–23)
CALCIUM SERPL-MCNC: 7.3 MG/DL (ref 8.3–10.4)
CHLORIDE SERPL-SCNC: 105 MMOL/L (ref 98–107)
CO2 SERPL-SCNC: 31 MMOL/L (ref 21–32)
CREAT SERPL-MCNC: 0.37 MG/DL (ref 0.6–1)
GLUCOSE SERPL-MCNC: 104 MG/DL (ref 65–100)
POTASSIUM SERPL-SCNC: 2.8 MMOL/L (ref 3.5–5.1)
SODIUM SERPL-SCNC: 142 MMOL/L (ref 136–145)

## 2018-11-03 PROCEDURE — 99232 SBSQ HOSP IP/OBS MODERATE 35: CPT | Performed by: INTERNAL MEDICINE

## 2018-11-03 PROCEDURE — 74011250636 HC RX REV CODE- 250/636: Performed by: INTERNAL MEDICINE

## 2018-11-03 PROCEDURE — 80048 BASIC METABOLIC PNL TOTAL CA: CPT

## 2018-11-03 PROCEDURE — 74011250637 HC RX REV CODE- 250/637: Performed by: HOSPITALIST

## 2018-11-03 PROCEDURE — 74011250637 HC RX REV CODE- 250/637: Performed by: INTERNAL MEDICINE

## 2018-11-03 PROCEDURE — 65270000029 HC RM PRIVATE

## 2018-11-03 PROCEDURE — 74011000258 HC RX REV CODE- 258: Performed by: INTERNAL MEDICINE

## 2018-11-03 PROCEDURE — 36415 COLL VENOUS BLD VENIPUNCTURE: CPT

## 2018-11-03 PROCEDURE — 82306 VITAMIN D 25 HYDROXY: CPT

## 2018-11-03 PROCEDURE — 74011000250 HC RX REV CODE- 250: Performed by: INTERNAL MEDICINE

## 2018-11-03 RX ORDER — POTASSIUM CHLORIDE 20 MEQ/1
40 TABLET, EXTENDED RELEASE ORAL EVERY 4 HOURS
Status: COMPLETED | OUTPATIENT
Start: 2018-11-03 | End: 2018-11-04

## 2018-11-03 RX ADMIN — POTASSIUM CHLORIDE: 2 INJECTION, SOLUTION, CONCENTRATE INTRAVENOUS at 13:12

## 2018-11-03 RX ADMIN — QUETIAPINE FUMARATE 200 MG: 100 TABLET ORAL at 21:47

## 2018-11-03 RX ADMIN — Medication 10 ML: at 05:21

## 2018-11-03 RX ADMIN — AMPICILLIN SODIUM AND SULBACTAM SODIUM 3 G: 2; 1 INJECTION, POWDER, FOR SOLUTION INTRAMUSCULAR; INTRAVENOUS at 14:59

## 2018-11-03 RX ADMIN — LEVOTHYROXINE SODIUM 25 MCG: 50 TABLET ORAL at 05:21

## 2018-11-03 RX ADMIN — SUCRALFATE 0.5 G: 1 SUSPENSION ORAL at 11:27

## 2018-11-03 RX ADMIN — SUCRALFATE 0.5 G: 1 SUSPENSION ORAL at 17:17

## 2018-11-03 RX ADMIN — SUCRALFATE 0.5 G: 1 SUSPENSION ORAL at 05:22

## 2018-11-03 RX ADMIN — AMPICILLIN SODIUM AND SULBACTAM SODIUM 3 G: 2; 1 INJECTION, POWDER, FOR SOLUTION INTRAMUSCULAR; INTRAVENOUS at 21:48

## 2018-11-03 RX ADMIN — VITAMIN D, TAB 1000IU (100/BT) 2000 UNITS: 25 TAB at 08:34

## 2018-11-03 RX ADMIN — CALCIUM CHLORIDE: 100 INJECTION INTRAVENOUS; INTRAVENTRICULAR at 17:17

## 2018-11-03 RX ADMIN — POTASSIUM CHLORIDE 40 MEQ: 20 TABLET, EXTENDED RELEASE ORAL at 21:46

## 2018-11-03 RX ADMIN — Medication 5 ML: at 13:36

## 2018-11-03 RX ADMIN — AMPICILLIN SODIUM AND SULBACTAM SODIUM 3 G: 2; 1 INJECTION, POWDER, FOR SOLUTION INTRAMUSCULAR; INTRAVENOUS at 08:34

## 2018-11-03 RX ADMIN — AZITHROMYCIN 500 MG: 250 TABLET, FILM COATED ORAL at 17:17

## 2018-11-03 RX ADMIN — Medication 5 ML: at 22:08

## 2018-11-03 RX ADMIN — SUCRALFATE 0.5 G: 1 SUSPENSION ORAL at 22:00

## 2018-11-03 RX ADMIN — POTASSIUM CHLORIDE 40 MEQ: 20 TABLET, EXTENDED RELEASE ORAL at 15:17

## 2018-11-03 RX ADMIN — AMPICILLIN SODIUM AND SULBACTAM SODIUM 3 G: 2; 1 INJECTION, POWDER, FOR SOLUTION INTRAMUSCULAR; INTRAVENOUS at 03:05

## 2018-11-03 RX ADMIN — LANSOPRAZOLE 30 MG: 30 TABLET, ORALLY DISINTEGRATING, DELAYED RELEASE ORAL at 05:21

## 2018-11-03 NOTE — PROGRESS NOTES
11/3/2018 Admit Date: 10/29/2018 Subjective: CHIEF COMPLAINT- pneumonia HPI Overall- about the same- poor po intake states her heartburn is keeping he up Diet-reg Appetite-fair Nausea-min Vomiting-no Pain-mild BM-yes Bleeding-no Medications-reviewed and adjusted as appropriate IV FLUIDS-reviewed FAM HX-per H&P SH-per H&P 
 tob-yes 
          etoh-no Past Medical History:  
Diagnosis Date  Anorexia  Anxiety  Depressive disorder, not elsewhere classified 4/9/2015  GERD (gastroesophageal reflux disease)  Loss of appetite  Pneumonia 7/20/15  Unspecified vitamin B deficiency 4/9/2015  Urinary tract infection, site not specified 4/9/2015 Past Surgical History:  
Procedure Laterality Date  HX COLONOSCOPY  last 2007 Jake Wendy  HX CYST INCISION AND DRAINAGE Right   
 breast  
 HX GI    
 anal sphincterotomy  HX LAP CHOLECYSTECTOMY  2005  HX TUBAL LIGATION  2001 ROS-- 
               RESP-pneumonia- feels better CARDIAC-neg -neg Further ROS as per PMH and PSH- see problem list     
                   
 
Objective:  
 
Visit Vitals /74 Pulse 90 Temp 98.4 °F (36.9 °C) Resp 21 SpO2 95% Intake/Output Summary (Last 24 hours) at 11/3/2018 0736 Last data filed at 11/2/2018 1745 Gross per 24 hour Intake 480 ml Output 400 ml Net 80 ml EXAM:   
 NEURO-a&o HEENT-wnl LUNGS-decreased breath sounds- improved Romie Zimmer ABD-soft , min tenderness, no rebound, good bs EXT-no edema LABS- 
Lab Results Component Value Date/Time WBC 14.1 (H) 11/02/2018 05:16 AM  
 RBC 3.22 (L) 11/02/2018 05:16 AM  
 HGB 9.7 (L) 11/02/2018 05:16 AM  
 HCT 28.8 (L) 11/02/2018 05:16 AM  
 PLATELET 080 58/71/0427 05:16 AM  
 
Lab Results Component Value Date/Time Sodium 142 11/03/2018 05:49 AM  
 Potassium 2.8 (LL) 11/03/2018 05:49 AM  
 Chloride 105 11/03/2018 05:49 AM  
 CO2 31 11/03/2018 05:49 AM  
 Anion gap 6 (L) 11/03/2018 05:49 AM  
 Glucose 104 (H) 11/03/2018 05:49 AM  
 BUN 2 (L) 11/03/2018 05:49 AM  
 Creatinine 0.37 (L) 11/03/2018 05:49 AM  
 GFR est AA >60 11/03/2018 05:49 AM  
 GFR est non-AA >60 11/03/2018 05:49 AM  
 Calcium 7.3 (L) 11/03/2018 05:49 AM  
 Magnesium 2.3 10/31/2018 06:16 AM  
 Albumin 1.5 (L) 11/02/2018 05:16 AM  
 Bilirubin, total 0.4 11/02/2018 05:16 AM  
 Protein, total 5.1 (L) 11/02/2018 05:16 AM  
 Globulin 3.6 (H) 11/02/2018 05:16 AM  
 A-G Ratio 0.4 (L) 11/02/2018 05:16 AM  
 AST (SGOT) 37 11/02/2018 05:16 AM  
 ALT (SGPT) 20 11/02/2018 05:16 AM  
 
 
   
Assessment:  
 
Principal Problem: 
  CAP (community acquired pneumonia) (10/29/2018) Active Problems: 
  Esophageal reflux (4/9/2015) Hypokalemia (10/29/2018) Hemoptysis (10/29/2018) Hypoxia (10/29/2018) Tachycardia (10/29/2018) Protein-calorie malnutrition, moderate ([address] (10/31/2018) 
 
11/2/18 
main problem is underlying eating disorder and malnutrition A trial of reglan may help with her gerd and po intake but she is on Seroquel and there is a high risk of interaction between reglan and antipsychotics with irreversible tardive dyskinesia so this is not an option The only other prokinetic is Motilin but is only available from [facility]) Discussed with Dr. Jordy Wiseman Will delay egd til pulmonary status improves 11/3/18 Looks a little better today Not many options for her gerd When family available can discuss ordering Domperidone ( Motolin)  from [facility]) Baclofen and erythromycin are short term options Plan: EGD mon Cont reg diet PT SEEN AND EXAMINED AND PLAN DISCUSSED AND IMPLEMENTED.  
Charlotte Olson MD

## 2018-11-03 NOTE — PROGRESS NOTES
Reassessment completed, no changes noted pt in bed resting call light within reach no distress noted will continue to monitor

## 2018-11-03 NOTE — PROGRESS NOTES
Hospitalist Progress Note Admit Date:  10/29/2018  6:38 PM  
Name:  Pauly Pastor Age:  45 y.o. 
:  1980 MRN:  [id number] PCP:  Oni Cortes DO Treatment Team: Attending Provider: Wu Ortiz MD; Consulting Provider: Miley Shearer MD; Care Manager: Nadine Cunningham RN; Utilization Review: Sergio Collins; Consulting Provider: Miguel Lopez RN; Consulting Provider: Emanuel Sena MD 
 
Subjective:  
 
From h and p  HPI: 
Patient is a 43 y/o female with hx GERD, anxiety, depression, hypothyroidism, anorexia who presents to ED with 1 week of progressive shortness of breath, cough and now hemoptysis. Was seen last week and thought she had gastritis and received medications for this. Saw her PCP today and was tachypneic, tachycardic with chest pain and hemoptysis. Then sent to ED to r/o PE or pneumonia. CT of chest negative for PE but she has extensive consolidation in left lower lobe and opacities in RLL, lingula and LLL. O2 sats were 87% on RA. She has no known lung disease such as asthma or COPD but does smoke. She received IV rocephin and azithromycin in ED. Hospitalist service consulted for admission and further treatment.  
 
Today: 
Father at bedside. Pt has received counseling for eating disorder and follow up may be arranged by father at discharge. Oral intake still negligible but improving. Nausea better and cough and dyspnea improving. Serum ca improving tsh slightly elevated, but I recommend follow up as outpt. -- If she is able to increase oral intake she will likey need outpt monitoring for refeeding syndrome. Objective:  
 
Patient Vitals for the past 24 hrs: 
 Temp Pulse Resp BP SpO2  
18 1152 98 °F (36.7 °C) 97 20 93/60 97 % 18 0807 98.4 °F (36.9 °C) 90 21 103/74 95 % 18 0358 98.5 °F (36.9 °C) 94 20 101/66 94 % 18 2303 98.3 °F (36.8 °C) 85 20 100/63 95 % 18 1918 97.9 °F (36.6 °C) 87 20 101/66 96 % 11/02/18 1452 98.4 °F (36.9 °C) 89 20 102/68 95 % Oxygen Therapy O2 Sat (%): 97 % (11/03/18 1152) Pulse via Oximetry: 89 beats per minute (10/31/18 1926) O2 Device: Nasal cannula (11/03/18 1413) O2 Flow Rate (L/min): 2 l/min (11/03/18 1413) FIO2 (%): 28 % (10/31/18 1926) ETCO2 (mmHg): 3 mmHg (10/29/18 1906) Intake/Output Summary (Last 24 hours) at 11/3/2018 1437 Last data filed at 11/3/2018 1312 Gross per 24 hour Intake 480 ml Output 200 ml Net 280 ml Physical Examination: 
General:    Lean body mass, more alert Head:  No facial asymmetry Eyes:  Extraocular movements intact, normal sclera CV:   RRR. No  Murmurs, clicks, or gallops Lungs:   Decreased breath sounds bilateral no loud wheeze at time of exam 
Abdomen:   Soft, nondistended, nontender. No ascites Extremities: Lean muscle mass no deformity Skin:     No rashes or jaundice. Dry skin and lanugo Neuro:  No gross focal deficits, no tremor Psych:  anxious but improving Data Review: 
I have reviewed all labs, meds, telemetry events, and studies from the last 24 hours. Recent Results (from the past 24 hour(s)) METABOLIC PANEL, BASIC Collection Time: 11/03/18  5:49 AM  
Result Value Ref Range Sodium 142 136 - 145 mmol/L Potassium 2.8 (LL) 3.5 - 5.1 mmol/L Chloride 105 98 - 107 mmol/L  
 CO2 31 21 - 32 mmol/L Anion gap 6 (L) 7 - 16 mmol/L Glucose 104 (H) 65 - 100 mg/dL BUN 2 (L) 6 - 23 MG/DL Creatinine 0.37 (L) 0.6 - 1.0 MG/DL  
 GFR est AA >60 >60 ml/min/1.73m2 GFR est non-AA >60 >60 ml/min/1.73m2 Calcium 7.3 (L) 8.3 - 10.4 MG/DL All Micro Results Procedure Component Value Units Date/Time CULTURE, BLOOD [[id number]] Collected:  10/30/18 1352 Order Status:  Completed Specimen:  Blood Updated:  11/03/18 5454 Special Requests: --     
  RIGHT Antecubital 
  
  Culture result: NO GROWTH 4 DAYS     
 CULTURE, BLOOD [[id number]] Collected:  10/30/18 3137 Order Status:  Completed Specimen:  Blood Updated:  11/03/18 8304 Special Requests: --     
  LEFT 
HAND Culture result: NO GROWTH 4 DAYS     
 CULTURE, RESPIRATORY/SPUTUM/BRONCH Bharti Garcia [[id number]] Collected:  10/30/18 1827 Order Status:  Completed Specimen:  Sputum Updated:  11/02/18 6464 Special Requests: NO SPECIAL REQUESTS     
  GRAM STAIN NO DEFINITE ORGANISM SEEN     
   22 TO 47 WBC'S SEEN PER OIF  
   NO EPITHELIAL CELLS SEEN     
   4+ MUCUS PRESENT Culture result: SCANT YEAST SCANT NORMAL RESPIRATORY ALF S. Pervis Renato, UR/CSF [[id number]] Collected:  10/29/18 1927 Order Status:  Completed Specimen:  Other from Miscellaneous sample Updated:  11/01/18 1438 Source URINE Specimen Urine Streptococcus pneumoniae Ag NEGATIVE Fluid culture Not Indicated Organism ID Not indicated. Please note Comment Comment: (NOTE) [employer] standards require a culture to be 
performed on CSF specimens submitted for bacterial antigen testing. (CAP [id number]) Urine specimens will not be cultured. Performed At: [address] [id number] Chiqui Vale MD VC:[id number] [facility] CULTURE + SMEAR W/RFLX ID FROM CULTURE [[id number]] Collected:  10/30/18 1215 Order Status:  Canceled FUNGUS CULTURE AND SMEAR [[id number]] Collected:  10/30/18 1215 Order Status:  Canceled Specimen:  Other Current Meds: 
Current Facility-Administered Medications Medication Dose Route Frequency  potassium chloride (K-DUR, KLOR-CON) SR tablet 40 mEq  40 mEq Oral Q4H  
 dextrose 5 % - 0.45% NaCl 1,000 mL with potassium chloride 20 mEq, thiamine 100 mg, mvi, adult no. 4 with vit K 10 mL, calcium chloride 1,000 mg infusion   IntraVENous CONTINUOUS  
 lansoprazole (PREVACID SOLUTAB) disintegrating tablet 30 mg  30 mg Oral ACB  azithromycin (ZITHROMAX) tablet 500 mg  500 mg Oral Q24H  
 levothyroxine (SYNTHROID) tablet 25 mcg  25 mcg Oral 6am  
 cholecalciferol (VITAMIN D3) tablet 2,000 Units  2,000 Units Oral DAILY  QUEtiapine (SEROquel) tablet 200 mg  200 mg Oral QHS  sucralfate (CARAFATE) 100 mg/mL oral suspension 0.5 g  0.5 g Oral AC&HS  promethazine (PHENERGAN) tablet 25 mg  25 mg Oral Q6H PRN  
 ondansetron (ZOFRAN) injection 4 mg  4 mg IntraVENous Q4H PRN  
 ampicillin-sulbactam (UNASYN) 3 g in 0.9% sodium chloride (MBP/ADV) 100 mL  3 g IntraVENous Q6H  
 sodium chloride (NS) flush 5-10 mL  5-10 mL IntraVENous Q8H  
 sodium chloride (NS) flush 5-10 mL  5-10 mL IntraVENous PRN  
 acetaminophen (TYLENOL) tablet 650 mg  650 mg Oral Q4H PRN  
 naloxone (NARCAN) injection 0.4 mg  0.4 mg IntraVENous PRN  
 albuterol (PROVENTIL VENTOLIN) nebulizer solution 2.5 mg  2.5 mg Nebulization Q4H PRN  
 bisacodyl (DULCOLAX) tablet 5 mg  5 mg Oral DAILY PRN  
 guaiFENesin ER (MUCINEX) tablet 600 mg  600 mg Oral Q12H  
 HYDROcodone-homatropine (HYCODAN) 5-1.5 mg/5 mL (5 mL) syrup 5 mL  5 mL Oral Q4H PRN Diet: DIET NUTRITIONAL SUPPLEMENTS 
DIET REGULAR Other Studies (last 24 hours): No results found. Assessment and Plan:  
 
Hospital Problems as of 11/3/2018 Date Reviewed: 11/1/2018 Codes Class Noted - Resolved POA Protein-calorie malnutrition, moderate (HCC) ICD-10-CM: E44.0 ICD-9-CM: 263.0  10/31/2018 - Present Unknown Hypokalemia ICD-10-CM: E87.6 ICD-9-CM: 276.8  10/29/2018 - Present Yes * (Principal) CAP (community acquired pneumonia) ICD-10-CM: J18.9 ICD-9-CM: 604  10/29/2018 - Present Yes Hemoptysis ICD-10-CM: R04.2 ICD-9-CM: 786.30  10/29/2018 - Present Yes Hypoxia ICD-10-CM: R09.02 
ICD-9-CM: 799.02  10/29/2018 - Present Yes Tachycardia ICD-10-CM: R00.0 ICD-9-CM: 785.0  10/29/2018 - Present Yes Anorexia nervosa ICD-10-CM: F50.00 ICD-9-CM: 307.1  4/9/2015 - Present Esophageal reflux ICD-10-CM: K21.9 ICD-9-CM: 530.81  4/9/2015 - Present Yes A/P:   
CAP (community acquired pneumonia) (10/29/2018)- Anorexia nervosa (4/9/2015) -- Possible associated gastroparesis but no safe motility agent given potential med interactions 
  
Moderate protein calorie malnutrition -- oral , supplement, see iv solution Hypocalcemia--pending vit d, corrects to 8.1 added cacl to ivf. 
  
  Esophageal reflux (4/9/2015)- current meds have helped symptoms 
  
  Hypokalemia (10/29/2018)--supplement to k 4-significant decrease noted today--see new order 
  
  Hypoxia (10/29/2018)--wean oxygen 
  
  
  
DC planning/Dispo:  goal home  
  
  
Code status:full

## 2018-11-03 NOTE — PROGRESS NOTES
Pt rested throughout the entirety of the shift with no adverse events. Pt is resting in bed, resp even, unlabored. Pt voice no complaints or issues at this time. SBAR end of shift report given to oncoming RN.

## 2018-11-03 NOTE — PROGRESS NOTES
Patient spilled Sucralfate in bed, an additional dose was removed from Pyxis, a note made to pharmacy to make aware of extra dose removed

## 2018-11-03 NOTE — PROGRESS NOTES
BSSR received from [facility] GARSIA, RN. Patient is AAOX4, no acute distress, on O2 at 2L/min via NC, denies pain at this time. SOB on exertion. Right arm heplock intact and patent. Luis Angel light within reach.

## 2018-11-03 NOTE — PROGRESS NOTES
[employer] Payment Admission Date: 10/29/2018 Daily Progress Note: 11/3/2018 The patient's chart is reviewed and the patient is discussed with the staff. 
 
45 y.o. CF evaluated at the request of Dr. Corie Olivarez. Has mental delay with reported 2nd grade education equivalent but is reported to be independent, h/o smoking-quit 1 year ago and has been vaping for the past year. H/o pneumonia in 2015, denies h/o asthma or COPD. About 4 days ago began to feel poorly with subjective fever, cough and shortness of breath. Was admitted and CXR and CT showing pneumonia L>R bases. She reported an episode of scant hemoptysis but not produced much over the past 16 hours. She required O2 and has been weaned to 10L HFNC at this time. She reports history of GERD and previously was on PPI. Has HOB elevated and since doing has less reflux symptoms. Has had aspiration episodes in past with reflux, but no overt pneumonia and none recently. Stays at home all day, doesn't work, no infectious contacts.  perhaps had a viral GI infection past week. Subjective:  
 
Feels about the same. Complains of stomach upset and poor appetite. O2 requirement down. Still not getting up and walking. U/S yesterday with only tiny left pleural effusion. No fevers. Current Facility-Administered Medications Medication Dose Route Frequency  lansoprazole (PREVACID SOLUTAB) disintegrating tablet 30 mg  30 mg Oral ACB  dextrose 5 % - 0.45% NaCl 1,000 mL with potassium chloride 20 mEq, thiamine 100 mg, mvi, adult no. 4 with vit K 10 mL infusion   IntraVENous CONTINUOUS  
 azithromycin (ZITHROMAX) tablet 500 mg  500 mg Oral Q24H  
 levothyroxine (SYNTHROID) tablet 25 mcg  25 mcg Oral 6am  
 cholecalciferol (VITAMIN D3) tablet 2,000 Units  2,000 Units Oral DAILY  QUEtiapine (SEROquel) tablet 200 mg  200 mg Oral QHS  sucralfate (CARAFATE) 100 mg/mL oral suspension 0.5 g  0.5 g Oral AC&HS  
  promethazine (PHENERGAN) tablet 25 mg  25 mg Oral Q6H PRN  
 ondansetron (ZOFRAN) injection 4 mg  4 mg IntraVENous Q4H PRN  
 ampicillin-sulbactam (UNASYN) 3 g in 0.9% sodium chloride (MBP/ADV) 100 mL  3 g IntraVENous Q6H  
 sodium chloride (NS) flush 5-10 mL  5-10 mL IntraVENous Q8H  
 sodium chloride (NS) flush 5-10 mL  5-10 mL IntraVENous PRN  
 acetaminophen (TYLENOL) tablet 650 mg  650 mg Oral Q4H PRN  
 naloxone (NARCAN) injection 0.4 mg  0.4 mg IntraVENous PRN  
 albuterol (PROVENTIL VENTOLIN) nebulizer solution 2.5 mg  2.5 mg Nebulization Q4H PRN  
 bisacodyl (DULCOLAX) tablet 5 mg  5 mg Oral DAILY PRN  
 guaiFENesin ER (MUCINEX) tablet 600 mg  600 mg Oral Q12H  
 HYDROcodone-homatropine (HYCODAN) 5-1.5 mg/5 mL (5 mL) syrup 5 mL  5 mL Oral Q4H PRN Review of Systems Constitutional: negative for fever, chills, sweats Cardiovascular: negative for chest pain, palpitations, syncope, edema Gastrointestinal:  Reports vomiting and ongoing nausea, negative for dysphagia, reflux, diarrhea, abdominal pain, or melena Neurologic:  negative for focal weakness, numbness, headache Objective:  
 
Vitals:  
 11/02/18 1918 11/02/18 2303 11/03/18 0358 11/03/18 4898 BP: 101/66 100/63 101/66 103/74 Pulse: 87 85 94 90 Resp: 20 20 20 21 Temp: 97.9 °F (36.6 °C) 98.3 °F (36.8 °C) 98.5 °F (36.9 °C) 98.4 °F (36.9 °C) SpO2: 96% 95% 94% 95% Intake and Output:  
11/01 1901 - 11/03 0700 In: 480 [P.O.:480] Out: 600 [Urine:600] No intake/output data recorded. Physical Exam:  
Constitution:  the patient is thin and in no acute distress, NC 2L sat 97% EENMT:  Sclera clear, pupils equal, oral mucosa moist 
Respiratory: few scattered crackles, L>R, productive cough at times Cardiovascular:  RRR without M,G,R 
Gastrointestinal: soft and non-tender; with positive bowel sounds. Musculoskeletal: warm without cyanosis. There is no lower leg edema. Skin:  no jaundice or rashes, no wounds Neurologic: no gross neuro deficits Psychiatric:  alert and oriented x 3 CXR: 11/1: increased left infiltrate vs more likely effusion Chest CT 10/29/18:   
1. No pulmonary embolism. 2. Extensive multi lobar consolidation with most significant involvement of the left lower lobe CXR 10/29/18:   
 
 
LAB No results for input(s): GLUCPOC in the last 72 hours. No lab exists for component: [place] Recent Labs 11/02/18 
1069 WBC 14.1* HGB 9.7* HCT 28.8*  Recent Labs 11/03/18 
0549 11/02/18 
0516 11/01/18 7007 11/01/18 
1023 11/01/18 
0724  142  --   --  138  
K 2.8* 3.6  --   --  3.0*  
 108*  --   --  102 CO2 31 28  --   --  25 * 97  --   --  75 BUN 2* 2*  --   --  3*  
CREA 0.37* 0.26*  --   --  0.18* CA 7.3* 6.6*  --   --  6.4*  
TROIQ  --  <0.02* <0.02* <0.02*  --   
ALB  --  1.5*  --   --   --   
TBILI  --  0.4  --   --   --   
ALT  --  20  --   --   --   
SGOT  --  37  --   --   --   
 
 
Assessment:  (Medical Decision Making) Hospital Problems  Date Reviewed: 11/1/2018 Codes Class Noted POA Hypokalemia ICD-10-CM: E87.6 ICD-9-CM: 276.8  10/29/2018 Yes  
 supplementing --follow up labs in AM  
 * (Principal) CAP (community acquired pneumonia) ICD-10-CM: J18.9 ICD-9-CM: 579  10/29/2018 Yes  
 continue antibiotics Hemoptysis ICD-10-CM: R04.2 ICD-9-CM: 786.30  10/29/2018 Yes  
 resolved Hypoxia ICD-10-CM: R09.02 
ICD-9-CM: 799.02  10/29/2018 Yes  
 remains on NC Tachycardia ICD-10-CM: R00.0 ICD-9-CM: 785.0  10/29/2018 Yes Resolved--HR 80-90s Anorexia nervosa ICD-10-CM: F50.00 ICD-9-CM: 307.1  4/9/2015 Yes  
 chronic Esophageal reflux ICD-10-CM: K21.9 ICD-9-CM: 530.81  4/9/2015 Yes Chronic--would benefit from GI when improved Plan:  (Medical Decision Making) --Unasyn day 5/7, Zithromax complete --Blood cultures:  Pending, NGTD 
--Respiratory culture:  Pending, NGTD --ARGELIA:  negative, scleroderma antibody: negative- 0.2  
--Strep pneumo antigen:  negative 
--GI following for nausea and poor PO tolerance 
--PT for mobility, encouraged to work with PT, added IS 
--wean O2 to off as tolerated, overall appears improving from pneumonia 
--Will repeat CXR PA/Lat on Monday if still admitted to check left effusion More than 50% of the time documented was spent in face-to-face contact with the patient and in the care of the patient on the floor/unit where the patient is located.  
 
Radha Burton MD

## 2018-11-04 LAB
ANION GAP SERPL CALC-SCNC: 5 MMOL/L (ref 7–16)
BACTERIA SPEC CULT: NORMAL
BACTERIA SPEC CULT: NORMAL
BUN SERPL-MCNC: 1 MG/DL (ref 6–23)
CALCIUM SERPL-MCNC: 8.1 MG/DL (ref 8.3–10.4)
CHLORIDE SERPL-SCNC: 107 MMOL/L (ref 98–107)
CO2 SERPL-SCNC: 30 MMOL/L (ref 21–32)
CREAT SERPL-MCNC: 0.34 MG/DL (ref 0.6–1)
DIFFERENTIAL METHOD BLD: ABNORMAL
ERYTHROCYTE [DISTWIDTH] IN BLOOD BY AUTOMATED COUNT: 14 %
GLUCOSE SERPL-MCNC: 100 MG/DL (ref 65–100)
HCT VFR BLD AUTO: 28.1 % (ref 35.8–46.3)
HGB BLD-MCNC: 9.4 G/DL (ref 11.7–15.4)
LYMPHOCYTES # BLD: 1.3 K/UL (ref 0.5–4.6)
LYMPHOCYTES NFR BLD MANUAL: 15 % (ref 16–44)
MCH RBC QN AUTO: 30.8 PG (ref 26.1–32.9)
MCHC RBC AUTO-ENTMCNC: 33.5 G/DL (ref 31.4–35)
MCV RBC AUTO: 92.1 FL (ref 79.6–97.8)
METAMYELOCYTES NFR BLD MANUAL: 6 %
MONOCYTES # BLD: 0.5 K/UL (ref 0.1–1.3)
MONOCYTES NFR BLD MANUAL: 6 % (ref 3–9)
MYELOCYTES NFR BLD MANUAL: 2 %
NEUTS SEG # BLD: 7.1 K/UL (ref 1.7–8.2)
NEUTS SEG NFR BLD MANUAL: 71 % (ref 47–75)
NRBC # BLD: 0 K/UL (ref 0–0.2)
PLATELET # BLD AUTO: 509 K/UL (ref 150–450)
PLATELET COMMENTS,PCOM: ABNORMAL
PMV BLD AUTO: 9.3 FL (ref 9.4–12.3)
POTASSIUM SERPL-SCNC: 4.4 MMOL/L (ref 3.5–5.1)
RBC # BLD AUTO: 3.05 M/UL (ref 4.05–5.2)
RBC MORPH BLD: ABNORMAL
RBC MORPH BLD: ABNORMAL
SERVICE CMNT-IMP: NORMAL
SERVICE CMNT-IMP: NORMAL
SODIUM SERPL-SCNC: 142 MMOL/L (ref 136–145)
WBC # BLD AUTO: 8.9 K/UL (ref 4.3–11.1)
WBC MORPH BLD: ABNORMAL

## 2018-11-04 PROCEDURE — 74011250636 HC RX REV CODE- 250/636: Performed by: INTERNAL MEDICINE

## 2018-11-04 PROCEDURE — 80048 BASIC METABOLIC PNL TOTAL CA: CPT

## 2018-11-04 PROCEDURE — 99232 SBSQ HOSP IP/OBS MODERATE 35: CPT | Performed by: INTERNAL MEDICINE

## 2018-11-04 PROCEDURE — 74011000258 HC RX REV CODE- 258: Performed by: INTERNAL MEDICINE

## 2018-11-04 PROCEDURE — 74011250637 HC RX REV CODE- 250/637: Performed by: HOSPITALIST

## 2018-11-04 PROCEDURE — 36415 COLL VENOUS BLD VENIPUNCTURE: CPT

## 2018-11-04 PROCEDURE — 77030020120 HC VLV RESP PEP HI -B

## 2018-11-04 PROCEDURE — 74011000250 HC RX REV CODE- 250: Performed by: INTERNAL MEDICINE

## 2018-11-04 PROCEDURE — 74011250637 HC RX REV CODE- 250/637: Performed by: INTERNAL MEDICINE

## 2018-11-04 PROCEDURE — 94760 N-INVAS EAR/PLS OXIMETRY 1: CPT

## 2018-11-04 PROCEDURE — 85025 COMPLETE CBC W/AUTO DIFF WBC: CPT

## 2018-11-04 PROCEDURE — 65270000029 HC RM PRIVATE

## 2018-11-04 PROCEDURE — 77010033678 HC OXYGEN DAILY

## 2018-11-04 RX ADMIN — AMPICILLIN SODIUM AND SULBACTAM SODIUM 3 G: 2; 1 INJECTION, POWDER, FOR SOLUTION INTRAMUSCULAR; INTRAVENOUS at 21:11

## 2018-11-04 RX ADMIN — VITAMIN D, TAB 1000IU (100/BT) 2000 UNITS: 25 TAB at 08:56

## 2018-11-04 RX ADMIN — SUCRALFATE 0.5 G: 1 SUSPENSION ORAL at 17:55

## 2018-11-04 RX ADMIN — CALCIUM CHLORIDE: 100 INJECTION INTRAVENOUS; INTRAVENTRICULAR at 21:00

## 2018-11-04 RX ADMIN — AMPICILLIN SODIUM AND SULBACTAM SODIUM 3 G: 2; 1 INJECTION, POWDER, FOR SOLUTION INTRAMUSCULAR; INTRAVENOUS at 14:06

## 2018-11-04 RX ADMIN — QUETIAPINE FUMARATE 200 MG: 100 TABLET ORAL at 21:10

## 2018-11-04 RX ADMIN — AZITHROMYCIN 500 MG: 250 TABLET, FILM COATED ORAL at 17:55

## 2018-11-04 RX ADMIN — SUCRALFATE 0.5 G: 1 SUSPENSION ORAL at 21:10

## 2018-11-04 RX ADMIN — Medication 10 ML: at 13:03

## 2018-11-04 RX ADMIN — AMPICILLIN SODIUM AND SULBACTAM SODIUM 3 G: 2; 1 INJECTION, POWDER, FOR SOLUTION INTRAMUSCULAR; INTRAVENOUS at 08:56

## 2018-11-04 RX ADMIN — ACETAMINOPHEN 650 MG: 325 TABLET, FILM COATED ORAL at 08:57

## 2018-11-04 RX ADMIN — PROMETHAZINE HYDROCHLORIDE 25 MG: 25 TABLET ORAL at 20:21

## 2018-11-04 RX ADMIN — POTASSIUM CHLORIDE 40 MEQ: 20 TABLET, EXTENDED RELEASE ORAL at 01:14

## 2018-11-04 RX ADMIN — Medication 10 ML: at 21:11

## 2018-11-04 RX ADMIN — LEVOTHYROXINE SODIUM 25 MCG: 50 TABLET ORAL at 05:47

## 2018-11-04 RX ADMIN — LANSOPRAZOLE 30 MG: 30 TABLET, ORALLY DISINTEGRATING, DELAYED RELEASE ORAL at 05:47

## 2018-11-04 RX ADMIN — ACETAMINOPHEN 650 MG: 325 TABLET, FILM COATED ORAL at 21:10

## 2018-11-04 RX ADMIN — SUCRALFATE 0.5 G: 1 SUSPENSION ORAL at 07:15

## 2018-11-04 RX ADMIN — AMPICILLIN SODIUM AND SULBACTAM SODIUM 3 G: 2; 1 INJECTION, POWDER, FOR SOLUTION INTRAMUSCULAR; INTRAVENOUS at 03:24

## 2018-11-04 RX ADMIN — Medication 5 ML: at 05:47

## 2018-11-04 RX ADMIN — SUCRALFATE 0.5 G: 1 SUSPENSION ORAL at 11:11

## 2018-11-04 RX ADMIN — CALCIUM CHLORIDE: 100 INJECTION INTRAVENOUS; INTRAVENTRICULAR at 02:36

## 2018-11-04 NOTE — PROGRESS NOTES
[facility] Admission Date: 10/29/2018 Daily Progress Note: 11/4/2018 The patient's chart is reviewed and the patient is discussed with the staff. 
 
45 y.o. CF evaluated at the request of Dr. Myron Montano. Has mental delay with reported 2nd grade education equivalent but is reported to be independent, h/o smoking-quit 1 year ago and has been vaping for the past year. H/o pneumonia in 2015, denies h/o asthma or COPD. About 4 days ago began to feel poorly with subjective fever, cough and shortness of breath. Was admitted and CXR and CT showing pneumonia L>R bases. She reported an episode of scant hemoptysis but not produced much over the past 16 hours. She required O2 and has been weaned to 10L HFNC at this time. She reports history of GERD and previously was on PPI. Has HOB elevated and since doing has less reflux symptoms. Has had aspiration episodes in past with reflux, but no overt pneumonia and none recently. Stays at home all day, doesn't work, no infectious contacts.  perhaps had a viral GI infection past week. Subjective:  
 
Feels about the same. Still refusing to walk. Doesn't say why. Just doesn't feel like it. Current Facility-Administered Medications Medication Dose Route Frequency  dextrose 5 % - 0.45% NaCl 1,000 mL with potassium chloride 20 mEq, thiamine 100 mg, mvi, adult no. 4 with vit K 10 mL, calcium chloride 1,000 mg infusion   IntraVENous CONTINUOUS  
 lansoprazole (PREVACID SOLUTAB) disintegrating tablet 30 mg  30 mg Oral ACB  azithromycin (ZITHROMAX) tablet 500 mg  500 mg Oral Q24H  
 levothyroxine (SYNTHROID) tablet 25 mcg  25 mcg Oral 6am  
 cholecalciferol (VITAMIN D3) tablet 2,000 Units  2,000 Units Oral DAILY  QUEtiapine (SEROquel) tablet 200 mg  200 mg Oral QHS  sucralfate (CARAFATE) 100 mg/mL oral suspension 0.5 g  0.5 g Oral AC&HS  promethazine (PHENERGAN) tablet 25 mg  25 mg Oral Q6H PRN  
  ondansetron (ZOFRAN) injection 4 mg  4 mg IntraVENous Q4H PRN  
 ampicillin-sulbactam (UNASYN) 3 g in 0.9% sodium chloride (MBP/ADV) 100 mL  3 g IntraVENous Q6H  
 sodium chloride (NS) flush 5-10 mL  5-10 mL IntraVENous Q8H  
 sodium chloride (NS) flush 5-10 mL  5-10 mL IntraVENous PRN  
 acetaminophen (TYLENOL) tablet 650 mg  650 mg Oral Q4H PRN  
 naloxone (NARCAN) injection 0.4 mg  0.4 mg IntraVENous PRN  
 albuterol (PROVENTIL VENTOLIN) nebulizer solution 2.5 mg  2.5 mg Nebulization Q4H PRN  
 bisacodyl (DULCOLAX) tablet 5 mg  5 mg Oral DAILY PRN  
 guaiFENesin ER (MUCINEX) tablet 600 mg  600 mg Oral Q12H  
 HYDROcodone-homatropine (HYCODAN) 5-1.5 mg/5 mL (5 mL) syrup 5 mL  5 mL Oral Q4H PRN Review of Systems Constitutional: negative for fever, chills, sweats Cardiovascular: negative for chest pain, palpitations, syncope, edema Gastrointestinal:  Reports vomiting and ongoing nausea, negative for dysphagia, reflux, diarrhea, abdominal pain, or melena Neurologic:  negative for focal weakness, numbness, headache Objective:  
 
Vitals:  
 11/03/18 2320 11/04/18 0329 11/04/18 2619 11/04/18 0745 BP: 105/69 100/67  102/69 Pulse: 80 78  80 Resp: 18 18  19 Temp: 98.1 °F (36.7 °C) 98.4 °F (36.9 °C)  98 °F (36.7 °C) SpO2: 96% 96% 94% 93% Intake and Output:  
11/02 1901 - 11/04 0700 In: 3158 [P.O.:420; I.V.:802] Out: -  
11/04 0701 - 11/04 1900 In: 120 [P.O.:120] Out: - Physical Exam:  
Constitution:  the patient is thin and in no acute distress, NC 2L sat 97% EENMT:  Sclera clear, pupils equal, oral mucosa moist 
Respiratory: few scattered crackles, L>R, productive cough at times Cardiovascular:  RRR without M,G,R 
Gastrointestinal: soft and non-tender; with positive bowel sounds. Musculoskeletal: warm without cyanosis. There is no lower leg edema. Skin:  no jaundice or rashes, no wounds Neurologic: no gross neuro deficits Psychiatric:  alert and oriented x 3 
 
 CXR: 11/1: increased left infiltrate vs more likely effusion Chest CT 10/29/18:   
1. No pulmonary embolism. 2. Extensive multi lobar consolidation with most significant involvement of the left lower lobe CXR 10/29/18:   
 
 
LAB No results for input(s): GLUCPOC in the last 72 hours. No lab exists for component: [place] Recent Labs 11/04/18 
0543 11/02/18 
9309 WBC 8.9 14.1* HGB 9.4* 9.7* HCT 28.1* 28.8*  
* 428 Recent Labs 11/04/18 
0543 11/03/18 
0549 11/02/18 
0516 11/01/18 [address]  142 142  --   
K 4.4 2.8* 3.6  --   
 105 108*  --   
CO2 30 31 28  --   
 104* 97  --   
BUN 1* 2* 2*  --   
CREA 0.34* 0.37* 0.26*  --   
CA 8.1* 7.3* 6.6*  --   
TROIQ  --   --  <0.02* <0.02* ALB  --   --  1.5*  --   
TBILI  --   --  0.4  --   
ALT  --   --  20  --   
SGOT  --   --  37  --   
 
 
Assessment:  (Medical Decision Making) Hospital Problems  Date Reviewed: 11/1/2018 Codes Class Noted POA Hypokalemia ICD-10-CM: E87.6 ICD-9-CM: 276.8  10/29/2018 Yes  
 supplementing --follow up labs in AM  
 * (Principal) CAP (community acquired pneumonia) ICD-10-CM: J18.9 ICD-9-CM: 845  10/29/2018 Yes  
 continue antibiotics Hemoptysis ICD-10-CM: R04.2 ICD-9-CM: 786.30  10/29/2018 Yes  
 resolved Hypoxia ICD-10-CM: R09.02 
ICD-9-CM: 799.02  10/29/2018 Yes  
 remains on NC Tachycardia ICD-10-CM: R00.0 ICD-9-CM: 785.0  10/29/2018 Yes Resolved--HR 80-90s Anorexia nervosa ICD-10-CM: F50.00 ICD-9-CM: 307.1  4/9/2015 Yes  
 chronic Esophageal reflux ICD-10-CM: K21.9 ICD-9-CM: 530.81  4/9/2015 Yes Chronic--would benefit from GI when improved Plan:  (Medical Decision Making) --Unasyn day 6/7, Zithromax complete --Blood cultures:  Pending, NGTD 
--Respiratory culture:  Pending, NGTD 
--ARGELIA:  negative, scleroderma antibody: negative- 0.2  
--Strep pneumo antigen:  negative 
--GI-EGD tomorrow --PT for mobility, encouraged to work with PT, added IS 
--wean O2 to off as tolerated, overall appears improving from pneumonia 
--Will repeat CXR PA/Lat on Monday if still admitted to check left effusion More than 50% of the time documented was spent in face-to-face contact with the patient and in the care of the patient on the floor/unit where the patient is located.  
 
Mariah Ignacio MD

## 2018-11-04 NOTE — PROGRESS NOTES
Hospitalist Progress Note Admit Date:  10/29/2018  6:38 PM  
Name:  Alyssa Montes Age:  45 y.o. 
:  1980 MRN:  [id number] PCP:  Cj Hough DO Treatment Team: Attending Provider: Jose David Duval MD; Consulting Provider: Meredith Aguilar MD; Care Manager: Dangelo Garcia RN; Utilization Review: Chaparro Wills; Consulting Provider: Larissa Sandifer, RN; Consulting Provider: Brant Longoria MD 
 
Subjective:  
 
 
From h and p  HPI: 
Patient is a 43 y/o female with hx GERD, anxiety, depression, hypothyroidism, anorexia who presents to ED with 1 week of progressive shortness of breath, cough and now hemoptysis. Was seen last week and thought she had gastritis and received medications for this. Saw her PCP today and was tachypneic, tachycardic with chest pain and hemoptysis. Then sent to ED to r/o PE or pneumonia. CT of chest negative for PE but she has extensive consolidation in left lower lobe and opacities in RLL, lingula and LLL. O2 sats were 87% on RA. She has no known lung disease such as asthma or COPD but does smoke. She received IV rocephin and azithromycin in ED. Hospitalist service consulted for admission and further treatment.  
  
Today: 
Eating more. Discussed need to increase activity and wean oxygen. Muscle spasm parathoracic discussed with her stretching. Objective:  
 
Patient Vitals for the past 24 hrs: 
 Temp Pulse Resp BP SpO2  
18 1203 97.6 °F (36.4 °C) 73 20 99/67 95 % 18 0745 98 °F (36.7 °C) 80 19 102/69 93 % 18 0713     94 % 18 0329 98.4 °F (36.9 °C) 78 18 100/67 96 % 18 2320 98.1 °F (36.7 °C) 80 18 105/69 96 % 18 1934 97.2 °F (36.2 °C) 93 20 115/72 94 % 18 1548 97.8 °F (36.6 °C) 88 20 105/73 94 % Oxygen Therapy O2 Sat (%): 95 % (18 1203) Pulse via Oximetry: 83 beats per minute (18 0713) O2 Device: Nasal cannula (18 1331) O2 Flow Rate (L/min): 2 l/min (11/04/18 1331) FIO2 (%): 28 % (10/31/18 1926) ETCO2 (mmHg): 3 mmHg (10/29/18 1906) Intake/Output Summary (Last 24 hours) at 11/4/2018 1331 Last data filed at 11/4/2018 3525 Gross per 24 hour Intake 1102 ml Output  Net 1102 ml Physical Examination: 
General:    Lean,. Head:  Normocephalic, atraumatic lean facies Eyes:  Extraocular movements intact, normal sclera CV:   RRR. No  Murmurs, clicks, or gallops Lungs:   Unlabored, no cyanosis thoracic muscle spasm Abdomen:   Soft, nondistended, nontender. Extremities: Warm and dry. No cyanosis or edema. Skin:     No rashes or jaundice. Neuro:  No gross focal deficits Psych:  Mood and affect appropriate Data Review: 
I have reviewed all labs, meds, telemetry events, and studies from the last 24 hours. Recent Results (from the past 24 hour(s)) METABOLIC PANEL, BASIC Collection Time: 11/04/18  5:43 AM  
Result Value Ref Range Sodium 142 136 - 145 mmol/L Potassium 4.4 3.5 - 5.1 mmol/L Chloride 107 98 - 107 mmol/L  
 CO2 30 21 - 32 mmol/L Anion gap 5 (L) 7 - 16 mmol/L Glucose 100 65 - 100 mg/dL BUN 1 (L) 6 - 23 MG/DL Creatinine 0.34 (L) 0.6 - 1.0 MG/DL  
 GFR est AA >60 >60 ml/min/1.73m2 GFR est non-AA >60 >60 ml/min/1.73m2 Calcium 8.1 (L) 8.3 - 10.4 MG/DL  
CBC WITH AUTOMATED DIFF Collection Time: 11/04/18  5:43 AM  
Result Value Ref Range WBC 8.9 4.3 - 11.1 K/uL  
 RBC 3.05 (L) 4.05 - 5.2 M/uL HGB 9.4 (L) 11.7 - 15.4 g/dL HCT 28.1 (L) 35.8 - 46.3 % MCV 92.1 79.6 - 97.8 FL  
 MCH 30.8 26.1 - 32.9 PG  
 MCHC 33.5 31.4 - 35.0 g/dL  
 RDW 14.0 % PLATELET 831 (H) 901 - 450 K/uL MPV 9.3 (L) 9.4 - 12.3 FL ABSOLUTE NRBC 0.00 0.0 - 0.2 K/uL NEUTROPHILS 71 47 - 75 % LYMPHOCYTES 15 (L) 16 - 44 % MONOCYTES 6 3 - 9 % METAMYELOCYTES 6 % MYELOCYTES 2 %  
 ABS. NEUTROPHILS 7.1 1.7 - 8.2 K/UL  
 ABS. LYMPHOCYTES 1.3 0.5 - 4.6 K/UL ABS. MONOCYTES 0.5 0.1 - 1.3 K/UL  
 RBC COMMENTS SLIGHT 
ANISOCYTOSIS + POIKILOCYTOSIS 
    
 RBC COMMENTS SLIGHT 
HYPOCHROMIA 
    
 WBC COMMENTS TOXIC GRANULATION    
 PLATELET COMMENTS INCREASED    
 DF MANUAL All Micro Results Procedure Component Value Units Date/Time CULTURE, BLOOD [[id number]] Collected:  10/30/18 1252 Order Status:  Completed Specimen:  Blood Updated:  11/04/18 6407 Special Requests: --     
  RIGHT Antecubital 
  
  Culture result: NO GROWTH 5 DAYS     
 CULTURE, BLOOD [[id number]] Collected:  10/30/18 1302 Order Status:  Completed Specimen:  Blood Updated:  11/04/18 5524 Special Requests: --     
  LEFT 
HAND Culture result: NO GROWTH 5 DAYS     
 CULTURE, RESPIRATORY/SPUTUM/BRONCH Elysia Royals [[id number]] Collected:  10/30/18 1827 Order Status:  Completed Specimen:  Sputum Updated:  11/02/18 6469 Special Requests: NO SPECIAL REQUESTS     
  GRAM STAIN NO DEFINITE ORGANISM SEEN     
   22 TO 47 WBC'S SEEN PER OIF  
   NO EPITHELIAL CELLS SEEN     
   4+ MUCUS PRESENT Culture result: SCANT YEAST SCANT NORMAL RESPIRATORY ALF [facility] [place], UR/CSF [[id number]] Collected:  10/29/18 1927 Order Status:  Completed Specimen:  Other from Miscellaneous sample Updated:  11/01/18 1438 Source URINE Specimen Urine Streptococcus pneumoniae Ag NEGATIVE Fluid culture Not Indicated Organism ID Not indicated. Please note Comment Comment: (NOTE) College of [employer] Pathologists standards require a culture to be 
performed on CSF specimens submitted for bacterial antigen testing. (CAP [id number]) Urine specimens will not be cultured. Performed At: [address] [id number] Dilan Arzate MD XO:[id number] AFB CULTURE + SMEAR W/RFLX ID FROM CULTURE [[id number]] Collected:  10/30/18 1215 Order Status:  Canceled FUNGUS CULTURE AND SMEAR [[id number]] Collected:  10/30/18 1215 Order Status:  Canceled Specimen:  Other Current Meds: 
Current Facility-Administered Medications Medication Dose Route Frequency  dextrose 5 % - 0.45% NaCl 1,000 mL with potassium chloride 20 mEq, thiamine 100 mg, mvi, adult no. 4 with vit K 10 mL, calcium chloride 1,000 mg infusion   IntraVENous CONTINUOUS  
 lansoprazole (PREVACID SOLUTAB) disintegrating tablet 30 mg  30 mg Oral ACB  azithromycin (ZITHROMAX) tablet 500 mg  500 mg Oral Q24H  
 levothyroxine (SYNTHROID) tablet 25 mcg  25 mcg Oral 6am  
 cholecalciferol (VITAMIN D3) tablet 2,000 Units  2,000 Units Oral DAILY  QUEtiapine (SEROquel) tablet 200 mg  200 mg Oral QHS  sucralfate (CARAFATE) 100 mg/mL oral suspension 0.5 g  0.5 g Oral AC&HS  promethazine (PHENERGAN) tablet 25 mg  25 mg Oral Q6H PRN  
 ondansetron (ZOFRAN) injection 4 mg  4 mg IntraVENous Q4H PRN  
 ampicillin-sulbactam (UNASYN) 3 g in 0.9% sodium chloride (MBP/ADV) 100 mL  3 g IntraVENous Q6H  
 sodium chloride (NS) flush 5-10 mL  5-10 mL IntraVENous Q8H  
 sodium chloride (NS) flush 5-10 mL  5-10 mL IntraVENous PRN  
 acetaminophen (TYLENOL) tablet 650 mg  650 mg Oral Q4H PRN  
 naloxone (NARCAN) injection 0.4 mg  0.4 mg IntraVENous PRN  
 albuterol (PROVENTIL VENTOLIN) nebulizer solution 2.5 mg  2.5 mg Nebulization Q4H PRN  
 bisacodyl (DULCOLAX) tablet 5 mg  5 mg Oral DAILY PRN  
 guaiFENesin ER (MUCINEX) tablet 600 mg  600 mg Oral Q12H  
 HYDROcodone-homatropine (HYCODAN) 5-1.5 mg/5 mL (5 mL) syrup 5 mL  5 mL Oral Q4H PRN Diet: DIET NUTRITIONAL SUPPLEMENTS 
DIET REGULAR 
DIET NPO Other Studies (last 24 hours): No results found. Assessment and Plan:  
 
Hospital Problems as of 11/4/2018 Date Reviewed: 11/1/2018 Codes Class Noted - Resolved POA Protein-calorie malnutrition, moderate (HCC) ICD-10-CM: E44.0 ICD-9-CM: 263.0  10/31/2018 - Present Unknown Hypokalemia ICD-10-CM: E87.6 ICD-9-CM: 276.8  10/29/2018 - Present Yes * (Principal) CAP (community acquired pneumonia) ICD-10-CM: J18.9 ICD-9-CM: 708  10/29/2018 - Present Yes Hemoptysis ICD-10-CM: R04.2 ICD-9-CM: 786.30  10/29/2018 - Present Yes Hypoxia ICD-10-CM: R09.02 
ICD-9-CM: 799.02  10/29/2018 - Present Yes Tachycardia ICD-10-CM: R00.0 ICD-9-CM: 785.0  10/29/2018 - Present Yes Anorexia nervosa ICD-10-CM: F50.00 ICD-9-CM: 307.1  4/9/2015 - Present Esophageal reflux ICD-10-CM: K21.9 ICD-9-CM: 530.81  4/9/2015 - Present Yes A/P:   
1. CAP (community acquired pneumonia) (10/29/2018)- 
 finishes iv antibiotics tomorrow. Wean oxygen.  
  
Anorexia nervosa (4/9/2015) -- Slight improvement in oral intake. 
  
Moderate protein calorie malnutrition -- oral , supplement, see iv solution likely discharge early to mid of this week if remains stable and continues to progress 
  
Hypocalcemia--pending vit d, corrects now 
  
  Esophageal reflux (4/9/2015)- current meds have helped symptoms-egd am 
  
  Hypokalemia (10/29/2018)--corrected

## 2018-11-04 NOTE — PROGRESS NOTES
11/4/2018 Admit Date: 10/29/2018 Subjective: CHIEF COMPLAINT- pneumonia HPI Overall- about the same- poor po intake states her heartburn is better but has pain with inspiration Diet-reg Appetite-fair Nausea-min Vomiting-no Pain-mild BM-yes Bleeding-no Medications-reviewed and adjusted as appropriate IV FLUIDS-reviewed FAM HX-per H&P SH-per H&P 
 tob-yes 
          etoh-no Past Medical History:  
Diagnosis Date  Anorexia  Anxiety  Depressive disorder, not elsewhere classified 4/9/2015  GERD (gastroesophageal reflux disease)  Loss of appetite  Pneumonia 7/20/15  Unspecified vitamin B deficiency 4/9/2015  Urinary tract infection, site not specified 4/9/2015 Past Surgical History:  
Procedure Laterality Date  HX COLONOSCOPY  last 2007 [facility] Arpita  HX CYST INCISION AND DRAINAGE Right   
 breast  
 HX GI    
 anal sphincterotomy  HX LAP CHOLECYSTECTOMY  2005  HX TUBAL LIGATION  2001 ROS-- 
               RESP-pneumonia- feels better but some chest pain today CARDIAC-neg -neg Further ROS as per PMH and PSH- see problem list     
                   
 
Objective:  
 
Visit Vitals /69 Pulse 80 Temp 98 °F (36.7 °C) Resp 19 SpO2 93% Intake/Output Summary (Last 24 hours) at 11/4/2018 8500 Last data filed at 11/4/2018 7829 Gross per 24 hour Intake 1342 ml Output  Net 1342 ml EXAM:   
 NEURO-a&o HEENT-wnl LUNGS-decreased breath sounds- improved [place] Quintana ABD-soft , min tenderness, no rebound, good bs EXT-no edema LABS- 
Lab Results Component Value Date/Time WBC 8.9 11/04/2018 05:43 AM  
 RBC 3.05 (L) 11/04/2018 05:43 AM  
 HGB 9.4 (L) 11/04/2018 05:43 AM  
 HCT 28.1 (L) 11/04/2018 05:43 AM  
 PLATELET 430 (H) 57/57/3218 05:43 AM  
 
Lab Results Component Value Date/Time Sodium 142 11/04/2018 05:43 AM  
 Potassium 4.4 11/04/2018 05:43 AM  
 Chloride 107 11/04/2018 05:43 AM  
 CO2 30 11/04/2018 05:43 AM  
 Anion gap 5 (L) 11/04/2018 05:43 AM  
 Glucose 100 11/04/2018 05:43 AM  
 BUN 1 (L) 11/04/2018 05:43 AM  
 Creatinine 0.34 (L) 11/04/2018 05:43 AM  
 GFR est AA >60 11/04/2018 05:43 AM  
 GFR est non-AA >60 11/04/2018 05:43 AM  
 Calcium 8.1 (L) 11/04/2018 05:43 AM  
 Magnesium 2.3 10/31/2018 06:16 AM  
 Albumin 1.5 (L) 11/02/2018 05:16 AM  
 Bilirubin, total 0.4 11/02/2018 05:16 AM  
 Protein, total 5.1 (L) 11/02/2018 05:16 AM  
 Globulin 3.6 (H) 11/02/2018 05:16 AM  
 A-G Ratio 0.4 (L) 11/02/2018 05:16 AM  
 AST (SGOT) 37 11/02/2018 05:16 AM  
 ALT (SGPT) 20 11/02/2018 05:16 AM  
 
 
   
Assessment:  
 
Principal Problem: 
  CAP (community acquired pneumonia) (10/29/2018) Active Problems: 
  Esophageal reflux (4/9/2015) Hypokalemia (10/29/2018) Hemoptysis (10/29/2018) Hypoxia (10/29/2018) Tachycardia (10/29/2018) Protein-calorie malnutrition, moderate ([address] (10/31/2018) 
 
11/2/18 
main problem is underlying eating disorder and malnutrition A trial of reglan may help with her gerd and po intake but she is on Seroquel and there is a high risk of interaction between reglan and antipsychotics with irreversible tardive dyskinesia so this is not an option The only other prokinetic is Motilin but is only available from General acute hospital) Discussed with Dr. Lomax Lowers Will delay egd til pulmonary status improves 11/3/18 Looks a little better today Not many options for her gerd When family available can discuss ordering Domperidone ( Motolin)  from General acute hospital) Baclofen and erythromycin are short term options 11/4/18 Still have not seen family Discussed Motolin with pt 
EGD tomorrow Plan:  
 
EGD tomorrow Cont reg diet PT SEEN AND EXAMINED AND PLAN DISCUSSED AND IMPLEMENTED.  
Vanesa Gatica MD

## 2018-11-04 NOTE — PROGRESS NOTES
BSR received from DASIA Ornelas shift assessment completed pt alert and oriented in bed resting call light within reach no distress noted will continue to monitor

## 2018-11-05 ENCOUNTER — ANESTHESIA (OUTPATIENT)
Dept: ENDOSCOPY | Age: 38
DRG: 139 | End: 2018-11-05
Payer: COMMERCIAL

## 2018-11-05 ENCOUNTER — APPOINTMENT (OUTPATIENT)
Dept: GENERAL RADIOLOGY | Age: 38
DRG: 139 | End: 2018-11-05
Attending: INTERNAL MEDICINE
Payer: COMMERCIAL

## 2018-11-05 ENCOUNTER — ANESTHESIA EVENT (OUTPATIENT)
Dept: ENDOSCOPY | Age: 38
DRG: 139 | End: 2018-11-05
Payer: COMMERCIAL

## 2018-11-05 LAB — 25(OH)D3+25(OH)D2 SERPL-MCNC: 23.5 NG/ML (ref 30–100)

## 2018-11-05 PROCEDURE — 74011000258 HC RX REV CODE- 258: Performed by: INTERNAL MEDICINE

## 2018-11-05 PROCEDURE — 94640 AIRWAY INHALATION TREATMENT: CPT

## 2018-11-05 PROCEDURE — 0DJ08ZZ INSPECTION OF UPPER INTESTINAL TRACT, VIA NATURAL OR ARTIFICIAL OPENING ENDOSCOPIC: ICD-10-PCS | Performed by: INTERNAL MEDICINE

## 2018-11-05 PROCEDURE — 74011250636 HC RX REV CODE- 250/636: Performed by: INTERNAL MEDICINE

## 2018-11-05 PROCEDURE — 65270000029 HC RM PRIVATE

## 2018-11-05 PROCEDURE — 94760 N-INVAS EAR/PLS OXIMETRY 1: CPT

## 2018-11-05 PROCEDURE — 74011250637 HC RX REV CODE- 250/637: Performed by: HOSPITALIST

## 2018-11-05 PROCEDURE — 74011250637 HC RX REV CODE- 250/637: Performed by: INTERNAL MEDICINE

## 2018-11-05 PROCEDURE — 74011000250 HC RX REV CODE- 250: Performed by: INTERNAL MEDICINE

## 2018-11-05 PROCEDURE — 71046 X-RAY EXAM CHEST 2 VIEWS: CPT

## 2018-11-05 PROCEDURE — 97530 THERAPEUTIC ACTIVITIES: CPT

## 2018-11-05 PROCEDURE — 76040000025: Performed by: INTERNAL MEDICINE

## 2018-11-05 PROCEDURE — 76060000031 HC ANESTHESIA FIRST 0.5 HR: Performed by: INTERNAL MEDICINE

## 2018-11-05 PROCEDURE — 99232 SBSQ HOSP IP/OBS MODERATE 35: CPT | Performed by: INTERNAL MEDICINE

## 2018-11-05 PROCEDURE — 74011250636 HC RX REV CODE- 250/636

## 2018-11-05 RX ORDER — PROPOFOL 10 MG/ML
INJECTION, EMULSION INTRAVENOUS AS NEEDED
Status: DISCONTINUED | OUTPATIENT
Start: 2018-11-05 | End: 2018-11-05 | Stop reason: HOSPADM

## 2018-11-05 RX ORDER — MIRTAZAPINE 15 MG/1
15 TABLET, FILM COATED ORAL
Status: DISCONTINUED | OUTPATIENT
Start: 2018-11-05 | End: 2018-11-06 | Stop reason: HOSPADM

## 2018-11-05 RX ORDER — LIDOCAINE HYDROCHLORIDE 20 MG/ML
INJECTION, SOLUTION EPIDURAL; INFILTRATION; INTRACAUDAL; PERINEURAL AS NEEDED
Status: DISCONTINUED | OUTPATIENT
Start: 2018-11-05 | End: 2018-11-05 | Stop reason: HOSPADM

## 2018-11-05 RX ORDER — SODIUM CHLORIDE, SODIUM LACTATE, POTASSIUM CHLORIDE, CALCIUM CHLORIDE 600; 310; 30; 20 MG/100ML; MG/100ML; MG/100ML; MG/100ML
1000 INJECTION, SOLUTION INTRAVENOUS CONTINUOUS
Status: DISCONTINUED | OUTPATIENT
Start: 2018-11-05 | End: 2018-11-05 | Stop reason: HOSPADM

## 2018-11-05 RX ADMIN — LEVOTHYROXINE SODIUM 25 MCG: 50 TABLET ORAL at 05:27

## 2018-11-05 RX ADMIN — LANSOPRAZOLE 30 MG: 30 TABLET, ORALLY DISINTEGRATING, DELAYED RELEASE ORAL at 05:27

## 2018-11-05 RX ADMIN — AZITHROMYCIN 500 MG: 250 TABLET, FILM COATED ORAL at 16:50

## 2018-11-05 RX ADMIN — LIDOCAINE HYDROCHLORIDE 40 MG: 20 INJECTION, SOLUTION EPIDURAL; INFILTRATION; INTRACAUDAL; PERINEURAL at 11:25

## 2018-11-05 RX ADMIN — AMPICILLIN SODIUM AND SULBACTAM SODIUM 3 G: 2; 1 INJECTION, POWDER, FOR SOLUTION INTRAMUSCULAR; INTRAVENOUS at 02:40

## 2018-11-05 RX ADMIN — SUCRALFATE 0.5 G: 1 SUSPENSION ORAL at 16:48

## 2018-11-05 RX ADMIN — CALCIUM CHLORIDE: 100 INJECTION INTRAVENOUS; INTRAVENTRICULAR at 16:49

## 2018-11-05 RX ADMIN — AMPICILLIN SODIUM AND SULBACTAM SODIUM 3 G: 2; 1 INJECTION, POWDER, FOR SOLUTION INTRAMUSCULAR; INTRAVENOUS at 21:48

## 2018-11-05 RX ADMIN — ACETAMINOPHEN 650 MG: 325 TABLET, FILM COATED ORAL at 13:17

## 2018-11-05 RX ADMIN — Medication 10 ML: at 05:27

## 2018-11-05 RX ADMIN — QUETIAPINE FUMARATE 200 MG: 100 TABLET ORAL at 21:49

## 2018-11-05 RX ADMIN — MIRTAZAPINE 15 MG: 15 TABLET, FILM COATED ORAL at 21:49

## 2018-11-05 RX ADMIN — SUCRALFATE 0.5 G: 1 SUSPENSION ORAL at 13:17

## 2018-11-05 RX ADMIN — Medication 10 ML: at 13:18

## 2018-11-05 RX ADMIN — Medication 5 ML: at 21:49

## 2018-11-05 RX ADMIN — AMPICILLIN SODIUM AND SULBACTAM SODIUM 3 G: 2; 1 INJECTION, POWDER, FOR SOLUTION INTRAMUSCULAR; INTRAVENOUS at 16:49

## 2018-11-05 RX ADMIN — SUCRALFATE 0.5 G: 1 SUSPENSION ORAL at 21:49

## 2018-11-05 RX ADMIN — SODIUM CHLORIDE, SODIUM LACTATE, POTASSIUM CHLORIDE, AND CALCIUM CHLORIDE 1000 ML: 600; 310; 30; 20 INJECTION, SOLUTION INTRAVENOUS at 11:08

## 2018-11-05 RX ADMIN — PROPOFOL 70 MG: 10 INJECTION, EMULSION INTRAVENOUS at 11:25

## 2018-11-05 RX ADMIN — AMPICILLIN SODIUM AND SULBACTAM SODIUM 3 G: 2; 1 INJECTION, POWDER, FOR SOLUTION INTRAMUSCULAR; INTRAVENOUS at 08:59

## 2018-11-05 NOTE — OP NOTES
Esophagogastroduodenoscopy    DATE of PROCEDURE: 11/5/2018    INDICATION: GERD    POSTPROCEDURE DIAGNOSIS:    MEDICATIONS ADMINISTERED: MAC anesthesia (see anesthesia report)    INSTRUMENT:GIF H190    PROCEDURE:  After obtaining informed consent, the patient was placed in the left lateral position and sedated. The endoscope was advanced under direct vision without difficulty. The esophagus, stomach (including retroflexed views) and duodenum were evaluated. The patient was taken to the recovery area in stable condition.     FINDINGS:  ESOPHAGUS: Normal  STOMACH: Normal  DUODENUM:  Normal    Estimated blood loss: 0-minimal   Specimens obtained during procedure: none    PLAN:  Continue symptomatic treatment with PPI and Carafate

## 2018-11-05 NOTE — PROGRESS NOTES
Hospitalist Progress Note Admit Date:  10/29/2018  6:38 PM  
Name:  Araceli Ruffin Age:  45 y.o. 
:  1980 MRN:  [id number] PCP:  Toni Whitehead DO Treatment Team: Attending Provider: Olivia Arnett MD; Consulting Provider: Dana Coelho MD; Care Manager: Esequiel Cunningham RN; Utilization Review: Flori Bailey; Consulting Provider: Cari Montes RN; Consulting Provider: Yrn Aguirre MD 
 
Subjective:  
 
 
From h and p  HPI: 
Patient is a 45 y/o female with hx GERD, anxiety, depression, hypothyroidism, anorexia who presents to ED with 1 week of progressive shortness of breath, cough and now hemoptysis. Was seen last week and thought she had gastritis and received medications for this. Saw her PCP today and was tachypneic, tachycardic with chest pain and hemoptysis. Then sent to ED to r/o PE or pneumonia. CT of chest negative for PE but she has extensive consolidation in left lower lobe and opacities in RLL, lingula and LLL. O2 sats were 87% on RA. She has no known lung disease such as asthma or COPD but does smoke. She received IV rocephin and azithromycin in ED. Hospitalist service consulted for admission and further treatment.  
  
Today: 
Still not eating well. Father reports cognitive level of function is equivalent to a second grader. Patient is  and her parents pay her bills and \"look after her\". Parents claim she has seen counselor in past for  Eating disorder. Pt.cant recall when. Parents report significant wt gain past few yrs and oral intake \"picky\" choices but recent lack of oral intake secondary to nausea which likely coorelates with lower lobe pneumonia. Physical markers of longstanding calorie restriction with lanugo,lean body mass. Serum calcium low but corrected. Discussed need for outpt close follow up if able to discharge next few days. Gi has planned possible egd in am, and recommended trial of remeron for appetite. Objective: Patient Vitals for the past 24 hrs: 
 Temp Pulse Resp BP SpO2  
11/05/18 1105  89 12 114/70 97 % 11/05/18 0753 98.3 °F (36.8 °C) 84 18 121/84 97 % 11/05/18 0350 99.5 °F (37.5 °C) 80 19 94/60 95 % 11/04/18 2333 99.3 °F (37.4 °C) 84 19 116/72 96 % 11/04/18 2007 99.1 °F (37.3 °C) 89 20 114/79 98 % 11/04/18 1912     98 % 11/04/18 1457 98.1 °F (36.7 °C) 89 21 104/71 96 % 11/04/18 1203 97.6 °F (36.4 °C) 73 20 99/67 95 % Oxygen Therapy O2 Sat (%): 97 % (11/05/18 1105) Pulse via Oximetry: 87 beats per minute (11/05/18 1105) O2 Device: Nasal cannula (11/05/18 1105) O2 Flow Rate (L/min): 2 l/min (11/05/18 1105) FIO2 (%): 24 % (11/04/18 1912) ETCO2 (mmHg): 3 mmHg (10/29/18 1906) Intake/Output Summary (Last 24 hours) at 11/5/2018 1115 Last data filed at 11/4/2018 2111 Gross per 24 hour Intake 460 ml Output  Net 460 ml Physical Examination: 
General:    malnourished Head:  Normocephalic, atraumatic, some mild temporal wasting Eyes:  Extraocular movements intact, normal sclera CV:   RRR. No  Murmurs, clicks, or gallops Lungs:   Few scattered rhonchi, clearer without wheeze Abdomen:   Soft, nondistended, nontender. Extremities: Warm and dry. No cyanosis or edema. Skin:     No rashes positive lanugo, ? Mottling? .  
Neuro:  No gross focal deficits Psych:  Less anxious. Data Review: 
I have reviewed all labs, meds, telemetry events, and studies from the last 24 hours. No results found for this or any previous visit (from the past 24 hour(s)). All Micro Results Procedure Component Value Units Date/Time CULTURE, BLOOD [[id number]] Collected:  10/30/18 1252 Order Status:  Completed Specimen:  Blood Updated:  11/04/18 4591 Special Requests: --     
  RIGHT Antecubital 
  
  Culture result: NO GROWTH 5 DAYS     
 CULTURE, BLOOD [[id number]] Collected:  10/30/18 1302 Order Status:  Completed Specimen:  Blood Updated:  11/04/18 6600 Special Requests: --     
  LEFT 
HAND Culture result: NO GROWTH 5 DAYS     
 CULTURE, RESPIRATORY/SPUTUM/BRONCH Moni Gonzalez [[id number]] Collected:  10/30/18 1827 Order Status:  Completed Specimen:  Sputum Updated:  11/02/18 6038 Special Requests: NO SPECIAL REQUESTS     
  GRAM STAIN NO DEFINITE ORGANISM SEEN     
   22 TO 47 WBC'S SEEN PER OIF  
   NO EPITHELIAL CELLS SEEN     
   4+ MUCUS PRESENT Culture result: SCANT YEAST SCANT NORMAL RESPIRATORY ALF [facility], UR/CSF [[id number]] Collected:  10/29/18 1927 Order Status:  Completed Specimen:  Other from Miscellaneous sample Updated:  11/01/18 1438 Source URINE Specimen Urine Streptococcus pneumoniae Ag NEGATIVE Fluid culture Not Indicated Organism ID Not indicated. Please note Comment Comment: (NOTE) College of American Pathologists standards require a culture to be 
performed on CSF specimens submitted for bacterial antigen testing. (CAP [id number]) Urine specimens will not be cultured. Performed At: [address] [id number] Nicki Alvarado MD FT:[id number] AFB CULTURE + SMEAR W/RFLX ID FROM CULTURE [[id number]] Collected:  10/30/18 1215 Order Status:  Canceled FUNGUS CULTURE AND SMEAR [[id number]] Collected:  10/30/18 1215 Order Status:  Canceled Specimen:  Other Current Meds: 
Current Facility-Administered Medications Medication Dose Route Frequency  lactated Ringers infusion 1,000 mL  1,000 mL IntraVENous CONTINUOUS  
 dextrose 5 % - 0.45% NaCl 1,000 mL with potassium chloride 20 mEq, thiamine 100 mg, mvi, adult no. 4 with vit K 10 mL, calcium chloride 1,000 mg infusion   IntraVENous CONTINUOUS  
 lansoprazole (PREVACID SOLUTAB) disintegrating tablet 30 mg  30 mg Oral ACB  azithromycin (ZITHROMAX) tablet 500 mg  500 mg Oral Q24H  
 levothyroxine (SYNTHROID) tablet 25 mcg  25 mcg Oral 6am  
  cholecalciferol (VITAMIN D3) tablet 2,000 Units  2,000 Units Oral DAILY  QUEtiapine (SEROquel) tablet 200 mg  200 mg Oral QHS  sucralfate (CARAFATE) 100 mg/mL oral suspension 0.5 g  0.5 g Oral AC&HS  promethazine (PHENERGAN) tablet 25 mg  25 mg Oral Q6H PRN  
 ondansetron (ZOFRAN) injection 4 mg  4 mg IntraVENous Q4H PRN  
 ampicillin-sulbactam (UNASYN) 3 g in 0.9% sodium chloride (MBP/ADV) 100 mL  3 g IntraVENous Q6H  
 sodium chloride (NS) flush 5-10 mL  5-10 mL IntraVENous Q8H  
 sodium chloride (NS) flush 5-10 mL  5-10 mL IntraVENous PRN  
 acetaminophen (TYLENOL) tablet 650 mg  650 mg Oral Q4H PRN  
 naloxone (NARCAN) injection 0.4 mg  0.4 mg IntraVENous PRN  
 albuterol (PROVENTIL VENTOLIN) nebulizer solution 2.5 mg  2.5 mg Nebulization Q4H PRN  
 bisacodyl (DULCOLAX) tablet 5 mg  5 mg Oral DAILY PRN  
 guaiFENesin ER (MUCINEX) tablet 600 mg  600 mg Oral Q12H  
 HYDROcodone-homatropine (HYCODAN) 5-1.5 mg/5 mL (5 mL) syrup 5 mL  5 mL Oral Q4H PRN Diet: DIET NUTRITIONAL SUPPLEMENTS 
DIET NPO Other Studies (last 24 hours): Xr Chest Pa Lat Result Date: 11/5/2018 EXAMINATION: CHEST RADIOGRAPH 11/5/2018 9:30 AM ACCESSION NUMBER: [id number] INDICATION: pneumonia COMPARISON: Chest x-ray 11/1/2018, chest x-ray 10/29/2018 TECHNIQUE: AP and lateral views of the chest were obtained. FINDINGS: Cardiac Silhouette: Within normal limits in size. Lungs: Unchanged left greater than right lung base opacities Pleura: Unchanged moderate left and small right pleural effusions. No pneumothorax. Osseous Structures: Thoracic spine spondylosis. Upper Abdomen: Unremarkable. IMPRESSION: No significant change in comparison to the x-rays of 11/1/2018, with findings suggestive of a bibasilar pneumonia and bilateral pleural effusions. Follow-up to resolution is recommended. VOICE DICTATED BY: Dr. Fierro Like Assessment and Plan:  
 
Hospital Problems as of 11/5/2018 Date Reviewed: 11/1/2018 Codes Class Noted - Resolved POA Protein-calorie malnutrition, moderate (HCC) ICD-10-CM: E44.0 ICD-9-CM: 263.0  10/31/2018 - Present Unknown Hypokalemia ICD-10-CM: E87.6 ICD-9-CM: 276.8  10/29/2018 - Present Yes * (Principal) CAP (community acquired pneumonia) ICD-10-CM: J18.9 ICD-9-CM: 673  10/29/2018 - Present Yes Hemoptysis ICD-10-CM: R04.2 ICD-9-CM: 786.30  10/29/2018 - Present Yes Hypoxia ICD-10-CM: R09.02 
ICD-9-CM: 799.02  10/29/2018 - Present Yes Tachycardia ICD-10-CM: R00.0 ICD-9-CM: 785.0  10/29/2018 - Present Yes Anorexia nervosa ICD-10-CM: F50.00 ICD-9-CM: 307.1  4/9/2015 - Present Esophageal reflux ICD-10-CM: K21.9 ICD-9-CM: 530.81  4/9/2015 - Present Yes A/P:  
1. CAP (community acquired pneumonia) (10/29/2018)- 
 finishes iv antibiotics today-- cxr with persistant bibasilar infiltrates and effusions, protein malnutrition definitely contributing.  
  
Anorexia nervosa (4/9/2015) and 
Moderate protein calorie malnutrition  -- 
Slight improvement in oral intake. recheck pre-albumin, resume outpt counselling ADD Connie Meyer as gastroenterology suggested 
  
Hypocalcemia--pending vit d, corrected--follow 
  
  Esophageal reflux (4/9/2015)- current meds have helped symptoms-egd possible in am--see gi note 
  
 
Physical rx for eval. Continue wean oxygen and if unable to wean may need to consider further eval. /therapeutic thoracent. await pulm follow up/recommendations. Add I.s. But unclear if she will comply.

## 2018-11-05 NOTE — PROGRESS NOTES
Jake Rousseau Admission Date: 10/29/2018 Daily Progress Note: 11/5/2018 The patient's chart is reviewed and the patient is discussed with the staff. 
 
45 y.o. CF evaluated at the request of Dr. Henrry Ware mental delay with reported 2nd grade education equivalent but is reported to be independent, h/o smoking-quit 1 year ago and has been vaping for the past year. H/o pneumonia in 2015, denies h/o asthma or COPD. About 4 days ago began to feel poorly with subjective fever, cough and shortness of breath. Was admitted and CXR and CT showing pneumonia L>R bases. She reported an episode of scant hemoptysis but not produced much over the past 16 hours. She required O2 and has been weaned to 10L HFNC at this time. She reports history of GERD and previously was on PPI. Has HOB elevated and since doing has less reflux symptoms. Has had aspiration episodes in past with reflux, but no overt pneumonia and none recently. Stays at home all day, doesn't work, no infectious contacts.  perhaps had a viral GI infection past week.  
 
 
 
Subjective:  
 
Patient states her breathing \"comes and goes\". Minimal coughing. Down to 1L O2. No new complaints. Current Facility-Administered Medications Medication Dose Route Frequency  dextrose 5 % - 0.45% NaCl 1,000 mL with potassium chloride 20 mEq, thiamine 100 mg, mvi, adult no. 4 with vit K 10 mL, calcium chloride 1,000 mg infusion   IntraVENous CONTINUOUS  
 lansoprazole (PREVACID SOLUTAB) disintegrating tablet 30 mg  30 mg Oral ACB  azithromycin (ZITHROMAX) tablet 500 mg  500 mg Oral Q24H  
 levothyroxine (SYNTHROID) tablet 25 mcg  25 mcg Oral 6am  
 cholecalciferol (VITAMIN D3) tablet 2,000 Units  2,000 Units Oral DAILY  QUEtiapine (SEROquel) tablet 200 mg  200 mg Oral QHS  sucralfate (CARAFATE) 100 mg/mL oral suspension 0.5 g  0.5 g Oral AC&HS  promethazine (PHENERGAN) tablet 25 mg  25 mg Oral Q6H PRN  
  ondansetron (ZOFRAN) injection 4 mg  4 mg IntraVENous Q4H PRN  
 ampicillin-sulbactam (UNASYN) 3 g in 0.9% sodium chloride (MBP/ADV) 100 mL  3 g IntraVENous Q6H  
 sodium chloride (NS) flush 5-10 mL  5-10 mL IntraVENous Q8H  
 sodium chloride (NS) flush 5-10 mL  5-10 mL IntraVENous PRN  
 acetaminophen (TYLENOL) tablet 650 mg  650 mg Oral Q4H PRN  
 naloxone (NARCAN) injection 0.4 mg  0.4 mg IntraVENous PRN  
 albuterol (PROVENTIL VENTOLIN) nebulizer solution 2.5 mg  2.5 mg Nebulization Q4H PRN  
 bisacodyl (DULCOLAX) tablet 5 mg  5 mg Oral DAILY PRN  
 guaiFENesin ER (MUCINEX) tablet 600 mg  600 mg Oral Q12H  
 HYDROcodone-homatropine (HYCODAN) 5-1.5 mg/5 mL (5 mL) syrup 5 mL  5 mL Oral Q4H PRN Review of Systems Constitutional: negative for fever, chills, sweats Cardiovascular: negative for chest pain, palpitations, syncope, edema Gastrointestinal: negative for dysphagia, reflux, vomiting, diarrhea, abdominal pain, or melena Neurologic: negative for focal weakness, numbness, headache Objective:  
 
Vitals:  
 11/04/18 2007 11/04/18 2333 11/05/18 0350 11/05/18 0388 BP: 114/79 116/72 94/60 121/84 Pulse: 89 84 80 84 Resp: 20 19 19 18 Temp: 99.1 °F (37.3 °C) 99.3 °F (37.4 °C) 99.5 °F (37.5 °C) 98.3 °F (36.8 °C) SpO2: 98% 96% 95% 97% Intake and Output:  
11/03 1901 - 11/05 0700 In: 9084 [P.O.:540; I.V.:902] Out: - No intake/output data recorded. Physical Exam:  
Constitution:  the patient is well developed and in no acute distress EENMT:  Sclera clear, pupils equal, oral mucosa moist 
Respiratory: CTA B in anterior lung fields. Cardiovascular:  RRR without M,G,R 
Gastrointestinal: soft and non-tender; with positive bowel sounds. Musculoskeletal: warm without cyanosis. There is no lower leg edema. Skin:  no jaundice or rashes, no wounds Neurologic: no gross neuro deficits Psychiatric:  alert and oriented x ppt CHEST XRAY:  
Today's pending LAB No lab exists for component: [place] Recent Labs 11/04/18 
0543 WBC 8.9 HGB 9.4* HCT 28.1*  
* Recent Labs 11/04/18 
0543 11/03/18 
6991  142  
K 4.4 2.8*  
 105 CO2 30 31  104* BUN 1* 2*  
CREA 0.34* 0.37* CA 8.1* 7.3* No results for input(s): PH, PCO2, PO2, HCO3 in the last 72 hours. MICRO Blood - negative Sputum - normal jermain Assessment:  (Medical Decision Making) Hospital Problems  Date Reviewed: 11/1/2018 Codes Class Noted POA Protein-calorie malnutrition, moderate (HCC) ICD-10-CM: E44.0 ICD-9-CM: 263.0  10/31/2018 Unknown Hypokalemia ICD-10-CM: E87.6 ICD-9-CM: 276.8  10/29/2018 Yes * (Principal) CAP (community acquired pneumonia) ICD-10-CM: J18.9 ICD-9-CM: 409  10/29/2018 Yes Hemoptysis ICD-10-CM: R04.2 ICD-9-CM: 786.30  10/29/2018 Yes Hypoxia ICD-10-CM: R09.02 
ICD-9-CM: 799.02  10/29/2018 Yes Tachycardia ICD-10-CM: R00.0 ICD-9-CM: 785.0  10/29/2018 Yes Esophageal reflux ICD-10-CM: K21.9 ICD-9-CM: 530.81  4/9/2015 Yes Patient with CAP. WBC normalized. Hypoxia nearly resolved. Clinically improving. Plan:  (Medical Decision Making)  
-f/u repeat CXR ordered for this morning.  
-try off o2 today and leave off if sats >92% 
-completes abx tomorrow.   
 
 
 
 
Logan Sims MD

## 2018-11-05 NOTE — PROGRESS NOTES
Problem: Interdisciplinary Rounds Goal: Interdisciplinary Rounds Interdisciplinary team rounds were held 11/5/2018 with the following team members:Care Management, Physical Therapy, Physician and Clinical Coordinator and the patient. Plan of care discussed. See clinical pathway and/or care plan for interventions and desired outcomes.

## 2018-11-05 NOTE — PROGRESS NOTES
Pt is in room alert oriented. No distress noted. Pt states her appetite is coming back. She is stable. No distress noted will continue to monitor.

## 2018-11-05 NOTE — PROGRESS NOTES
Pt is in bed alert and oriented rr are even and unlabored. Lung sounds are diminished O2 in place 2L NC. No distress noted. abd is soft bowel sounds are active. Pt has been NPO since midnight meds held until after EGD this AM. IV abt given. She is stable. Safety measures in place will continue to monitor.

## 2018-11-05 NOTE — ANESTHESIA PREPROCEDURE EVALUATION
Anesthetic History PONV Review of Systems / Medical History Patient summary reviewed and pertinent labs reviewed Pulmonary Pneumonia (Recent admission - currently on 2L NC. Reports coughing is better. ) and smoker Neuro/Psych Psychiatric history Cardiovascular Within defined limits Exercise tolerance: <4 METS 
  
GI/Hepatic/Renal 
  
GERD Endo/Other Within defined limits Other Findings Comments: Fatigue Physical Exam 
 
Airway Mallampati: I 
TM Distance: 4 - 6 cm Neck ROM: normal range of motion Mouth opening: Normal 
 
 Cardiovascular Regular rate and rhythm,  S1 and S2 normal,  no murmur, click, rub, or gallop Rhythm: regular Rate: normal 
 
 
 
 Dental 
 
 
Comments: No lowers, studs only Pulmonary Decreased breath sounds: bilateral 
 
 
 
Pertinent negatives: No wheezes Abdominal 
GI exam deferred Other Findings Anesthetic Plan ASA: 3 Anesthesia type: total IV anesthesia Induction: Intravenous Anesthetic plan and risks discussed with: Patient

## 2018-11-05 NOTE — PROGRESS NOTES
Problem: Mobility Impaired (Adult and Pediatric) Goal: *Acute Goals and Plan of Care (Insert Text) STG: 
(1.)Ms. Yosef Arias will move from supine to sit and sit to supine  with SUPERVISION within 3 treatment day(s). (2.)Ms. Yosef Arias will transfer from bed to chair and chair to bed with STAND BY ASSIST using the least restrictive device within 3 treatment day(s). (3.)Ms. Yosef Arias will ambulate with STAND BY ASSIST for 100 feet with the least restrictive device within 3 treatment day(s). LTG: 
(1.)Ms. Yosef Arias will move from supine to sit and sit to supine  in bed with INDEPENDENT within 7 treatment day(s). (2.)Ms. Yosef Arias will transfer from bed to chair and chair to bed with INDEPENDENT using the least restrictive device within 7 treatment day(s). (3.)Ms. Yosef Arias will ambulate with INDEPENDENT for 200+ feet with the least restrictive device within 7 treatment day(s). ________________________________________________________________________________________________ PHYSICAL THERAPY: Daily Note, Treatment Day: 1st, PM 11/5/2018INPATIENT: Hospital Day: 8 Payor: ABSOLUTE TOTAL CARE / [place] / Product Type: Managed Care Medicaid /  
  
NAME/AGE/GENDER: Grady Christopher is a 45 y.o. female PRIMARY DIAGNOSIS: CAP (community acquired pneumonia) Hemoptysis Hypokalemia CAP (community acquired pneumonia) CAP (community acquired pneumonia) Procedure(s) (LRB): ESOPHAGOGASTRODUODENOSCOPY (EGD) (N/A) Day of Surgery ICD-10: Treatment Diagnosis:  
 · Generalized Muscle Weakness (M62.81) · Difficulty in walking, Not elsewhere classified (R26.2) Precaution/Allergies: 
Flagyl [metronidazole] and Zyprexa [olanzapine] ASSESSMENT:  
Ms. Yosef Arias is supine in bed upon contact  and agreeable to work with PT following max encouragement. Pt lives with her  in 1 story home with 2 steps to enter.  Pt is typically independent with gait and ADLs at baseline. No O2. Patient states that her side hurts. Bed mobility is independent. Sitting balance good. Sit to stand with contact guard assist.  Gait with rolling walker x 20 feet with slow but steady lester. Patient did require verbal cues for safety with walker. Patient is returned to supine in bed with needs within reach. Good session. Some progress demonstrated. Patient encouraged to increase mobility. Michael Odom will benefit from skilled PT (medically necessary) to address decreased strength, decreased balance, decreased functional tolerance, decreased cardiopulmonary endurance affecting participation in basic ADLs and functional tasks. Will continue PT efforts. This section established at most recent assessment PROBLEM LIST (Impairments causing functional limitations): 1. Decreased Strength 2. Decreased ADL/Functional Activities 3. Decreased Transfer Abilities 4. Decreased Ambulation Ability/Technique 5. Decreased Balance 6. Decreased Activity Tolerance 7. Decreased Pacing Skills 8. Increased Fatigue 9. Increased Shortness of Breath 10. Decreased [place] with Home Exercise Program 
11. Decreased Cognition INTERVENTIONS PLANNED: (Benefits and precautions of physical therapy have been discussed with the patient.) 1. Balance Exercise 2. Bed Mobility 3. Family Education 4. Gait Training 5. Home Exercise Program (HEP) 6. Neuromuscular Re-education/Strengthening 7. Therapeutic Activites 8. Therapeutic Exercise/Strengthening 9. Transfer Training TREATMENT PLAN: Frequency/Duration: 3 times a week for duration of hospital stay Rehabilitation Potential For Stated Goals: Fair RECOMMENDED REHABILITATION/EQUIPMENT: (at time of discharge pending progress): Due to the probability of continued deficits (see above) this patient will not likely need continued skilled physical therapy after discharge. Equipment: ? None at this time HISTORY:  
History of Present Injury/Illness (Reason for Referral): 
See H&P below Patient is a 43 y/o female with hx GERD, anxiety, depression, hypothyroidism, anorexia who presents to ED with 1 week of progressive shortness of breath, cough and now hemoptysis. Was seen last week and thought she had gastritis and received medications for this. Saw her PCP today and was tachypneic, tachycardic with chest pain and hemoptysis. Then sent to ED to r/o PE or pneumonia. CT of chest negative for PE but she has extensive consolidation in left lower lobe and opacities in RLL, lingula and LLL. O2 sats were 87% on RA. She has no known lung disease such as asthma or COPD but does smoke. She received IV rocephin and azithromycin in ED. Hospitalist service consulted for admission and further treatment.  
  
 
 
Past Medical History/Comorbidities: Ms. Clinton Pimentel  has a past medical history of Anorexia, Anxiety, Depressive disorder, not elsewhere classified, GERD (gastroesophageal reflux disease), Loss of appetite, Pneumonia, Unspecified vitamin B deficiency, and Urinary tract infection, site not specified. Ms. Clinton Pimentel  has a past surgical history that includes hx lap cholecystectomy (2005); hx tubal ligation (2001); hx cyst incision and drainage (Right); hx colonoscopy (last 2007); hx gi; ULTRASOUND (Bilateral, 11/2/2018); and PRONE LATERAL INTERNAL SPHINCTEROTOMY (N/A, 9/9/2015). Social History/Living Environment:  
Home Environment: Private residence # Steps to Enter: 2 One/Two Story Residence: One story Living Alone: No 
Support Systems: Spouse/Significant Other/Partner, Parent Patient Expects to be Discharged to[de-identified] Private residence Current DME Used/Available at Home: None Prior Level of Function/Work/Activity: 
Lives with , support of parents, indep with mobility Number of Personal Factors/Comorbidities that affect the Plan of Care: 3+: HIGH COMPLEXITY EXAMINATION:  
 Most Recent Physical Functioning:  
Gross Assessment: 
  
         
  
Posture: 
  
Balance: 
Sitting: Intact Standing: Intact Bed Mobility: 
Rolling: Modified independent Supine to Sit: Modified independent Sit to Supine: Modified independent Scooting: Modified independent Wheelchair Mobility: 
  
Transfers: 
Sit to Stand: Contact guard assistance Stand to Sit: Contact guard assistance Gait: 
  
Speed/Twyla: Shuffled; Slow Step Length: Left shortened;Right shortened Gait Abnormalities: Decreased step clearance Distance (ft): 20 Feet (ft) Assistive Device: Walker, rolling Ambulation - Level of Assistance: Contact guard assistance;Minimal assistance Interventions: Safety awareness training Body Structures Involved: 1. Nerves 2. Heart 3. Lungs 4. Bones 5. Muscles Body Functions Affected: 1. Mental 
2. Cardio 3. Respiratory 4. Neuromusculoskeletal 
5. Movement Related Activities and Participation Affected: 1. Learning and Applying Knowledge 2. General Tasks and Demands 3. Mobility 4. Self Care 5. Domestic Life 6. Interpersonal Interactions and Relationships 7. Community, Social and [place] Number of elements that affect the Plan of Care: 4+: HIGH COMPLEXITY CLINICAL PRESENTATION:  
Presentation: Evolving clinical presentation with changing clinical characteristics: MODERATE COMPLEXITY CLINICAL DECISION MAKIN[address] [place], [place] [address] 6 Clicks Basic Mobility Inpatient Short Form How much difficulty does the patient currently have. .. Unable A Lot A Little None 1. Turning over in bed (including adjusting bedclothes, sheets and blankets)? [] 1   [] 2   [x] 3   [] 4  
2. Sitting down on and standing up from a chair with arms ( e.g., wheelchair, bedside commode, etc.)   [] 1   [] 2   [x] 3   [] 4  
3. Moving from lying on back to sitting on the side of the bed? [] 1   [] 2   [x] 3   [] 4 How much help from another person does the patient currently need. ..  Total A Lot A Little None 4. Moving to and from a bed to a chair (including a wheelchair)? [] 1   [] 2   [x] 3   [] 4  
5. Need to walk in hospital room? [] 1   [] 2   [x] 3   [] 4  
6. Climbing 3-5 steps with a railing? [] 1   [] 2   [x] 3   [] 4  
© 2007, Trustees of [facility] [employer], under license to [employer]. All rights reserved Score:  Initial: 18 Most Recent: X (Date: -- ) Interpretation of Tool:  Represents activities that are increasingly more difficult (i.e. Bed mobility, Transfers, Gait). Score 24 23 22-20 19-15 14-10 9-7 6 Modifier CH CI CJ CK CL CM CN   
 
? Mobility - Walking and Moving Around:  
  - CURRENT STATUS: CK - 40%-59% impaired, limited or restricted  - GOAL STATUS: CJ - 20%-39% impaired, limited or restricted  - D/C STATUS:  ---------------To be determined--------------- Payor: ABSOLUTE TOTAL CARE / [place] / Product Type: Managed Care Medicaid /   
 
Medical Necessity:    
· Patient is expected to demonstrate progress in strength, balance, coordination and functional technique to decrease assistance required with gait, transfers, and functional mobility. Amanda Gibbs Reason for Services/Other Comments: 
· Patient continues to require skilled intervention due to decreased strength, decreased balance, decreased functional tolerance, decreased cardiopulmonary endurance affecting participation in basic ADLs and functional tasks. Use of outcome tool(s) and clinical judgement create a POC that gives a: Clear prediction of patient's progress: LOW COMPLEXITY  
  
 
 
 
TREATMENT:  
(In addition to Assessment/Re-Assessment sessions the following treatments were rendered) Pre-treatment Symptoms/Complaints: \"I don't know if I can\" Pain: Initial:  
Pain Intensity 1: (no number given) Pain Location 1: Abdomen(side)  Post Session:  0/10 Therapeutic Activity: (    10 minutes):   Therapeutic activities including Bed transfers and Ambulation on level ground and cues for ease and safety of transfers, pacing techniques, energy conservation to improve mobility, strength, balance and coordination. Required minimal to contact guard assist with  Safety awareness training to promote static and dynamic balance in standing and promote coordination of bilateral, lower extremity(s). Braces/Orthotics/Lines/Etc:  
· O2 Device: Nasal cannula Treatment/Session Assessment:   
· Response to Treatment:  Tolerated well · Interdisciplinary Collaboration:  
o Physical Therapy Assistant 
o Registered Nurse · After treatment position/precautions:  
o Supine in bed 
o Bed/Chair-wheels locked 
o Bed in low position 
o Call light within reach 
o RN notified · Compliance with Program/Exercises: Will assess as treatment progresses · Recommendations/Intent for next treatment session: \"Next visit will focus on advancements to more challenging activities and reduction in assistance provided\". Total Treatment Duration: PT Patient Time In/Time Out Time In: 3274 Time Out: 1540 Ashley Eli, KELL

## 2018-11-05 NOTE — PROGRESS NOTES
Patient resting in bed on 1L NC. Patient has slept most of the night and has no complaints at this time. Patient remains NPO for upcoming EGD. Bed in low, locked position with call light in reach. Bedside shift report to be given to oncoming nurse.

## 2018-11-05 NOTE — ADT AUTH CERT NOTES
LOC:Acute Adult-Infection: Pneumonia (11/4/2018) by Kirk Alvarez Review Status Review Entered In Primary 11/5/2018 15:41 Criteria Review REVIEW SUMMARY 
  
Patient: Dilshad Treviño Review Number: [id number] Review Status: In Primary 
  
Condition Specific: Yes 
  
Condition Level Of Care Code: ACUTE Condition Level Of Care Description: Acute 
  
  
OUTCOMES Outcome Type: Primary 
  
  
  
REVIEW DETAILS 
  
Service Date: 11/04/2018 Admit Date: 10/29/2018 Product: Nita Patience Adult Subset: Infection: Pneumonia 
    (Symptom or finding within 24h) 
  (Excludes PO medications unless noted) Select Day, One: 
            [ ] Episode Day 7, One: 
                [ ] ACUTE, One: 
                    [ ] Partial responder, not clinically stable for discharge and requires continued stay, >= One: 
                        [ ] Inadequate oral intake and IV fluid, One: 
                        ~--Admin, IQ Admin Admin on 11- 03:41 PM--~ Hospitalist Progress Note Eating more. Discussed need to increase activity and wean oxygen. Muscle spasm parathoracic discussed with her stretching. EXAM:  Lungs:             Unlabored, no cyanosis thoracic muscle spasm T 98, /69, P 80,R 20, 02 SAT 93% 2L NC 
                         
                        RBC 3.05, HGB 9.4, HCT 28.1, , BUN 1, CREAT 0.34, CA 8.1 A/P:   
                        1. CAP (community acquired pneumonia) (10/29/2018)- 
                         finishes iv antibiotics tomorrow. Wean oxygen. Anorexia nervosa (4/9/2015) -- Slight improvement in oral intake. Moderate protein calorie malnutrition -- oral , supplement, see iv solution likely discharge early to mid of this week if remains stable and continues to progress Hypocalcemia--pending vit d, corrects now Esophageal reflux (4/9/2015)- current meds have helped symptoms-egd am 
                          
                          Hypokalemia (10/29/2018)--corrected GI NOTE: 
                        CHIEF COMPLAINT- pneumonia HPI Overall- about the same- poor po intake states her heartburn is better but has pain with inspiration Diet-reg Appetite-fair Nausea-min Vomiting-no Pain-mild BM-yes Bleeding-no 11/4/18 Still have not seen family Discussed Motolin with pt 
                        EGD tomorrow OBTAIN CONSENT FOR EGD, NPO AFTER MN, RT-FLUTTER VALVE TX QID, TYLENOL PO Q 4 HRS PRN X2, UNASYN 3G IV Q 6 HRS, ZITHROMAX 500 MG PO Q 24 HRS, IV GOODY BAG 50 ML/HR, SYNTHROID PO QD, PHENERGAN 25 MGPO Q 6 HRS PRN X1, CARAFATE AC & HS, KCL 40 MEQ PO X1 
                         
                         
                         
  
Version: InterQual® 2018.1 Oksana Jarvis  © 2018 [employer] and/or one of its [place]. All Rights Reserved. CPT only © 2017 American Medical Association. All Rights Reserved. LOC:Acute Adult-Infection: Pneumonia (11/3/2018) by Carlyn Levin Review Status Review Entered In Primary 11/5/2018 15:38 Criteria Review REVIEW SUMMARY 
  
Patient: Maggie Hernandez Review Number: [id number] Review Status: In Primary 
  
Condition Specific: Yes 
  
Condition Level Of Care Code: ACUTE Condition Level Of Care Description: Acute 
  
  
OUTCOMES Outcome Type: Primary 
  
  
  
REVIEW DETAILS 
  
Service Date: 11/03/2018 Admit Date: 10/29/2018 Product: Falcon Rung Adult Subset: Infection: Pneumonia 
    (Symptom or finding within 24h) 
  (Excludes PO medications unless noted) [X] Select Day, One: 
            [X] Episode Day 6, One: 
                [X] ACUTE, One: 
                    [X] Partial responder, not clinically stable for discharge and requires continued stay, >= One: 
                    ~--Admin, IQ Admin Admin on 11- 03:38 PM--~ 
                    GI NOTE: 
                                         
                                          Overall- about the same- poor po intake states her heartburn is keeping he up Diet-reg Appetite-fair Nausea-min Vomiting-no Pain-mild BM-yes Bleeding-no 11/3/18 Looks a little better today Not many options for her gerd When family available can discuss ordering Domperidone ( Motolin)  from [facility] Baclofen and erythromycin are short term options Plan: 
                                          
                                        EGD mon Cont reg diet Hospitalist Progress Note Pt has received counseling for eating disorder and follow up may be arranged by father at discharge. Oral intake still negligible but improving. Nausea better and cough and dyspnea improving. Serum ca improving tsh slightly elevated, but I recommend follow up as outpt. -- If she is able to increase oral intake she will likey need outpt monitoring for refeeding syndrome. EXAM:  Lungs: Decreased breath sounds bilateral no loud wheeze at time of exam 
                                         
                                        Extremities: Lean muscle mass no deformity. Skin:   No rashes or jaundice. Dry skin and lanugo. Psych:  anxious but improving T 98.4, bp 103/74, p 90, r 21, 02 sat 95% 2l nc 
                                         
                                         
                                        K 2.8, GLUC 104, BUN 2, CREAT 0.37, CA 7.3 CAP (community acquired pneumonia) (10/29/2018)-                                         Anorexia nervosa (4/9/2015) -- 
 Possible associated gastroparesis but no safe motility agent given potential med interactions Moderate protein calorie malnutrition -- oral , supplement, see iv solution Hypocalcemia--pending vit d, corrects to 8.1 added cacl to ivf. Esophageal reflux (4/9/2015)- current meds have helped symptoms Hypokalemia (10/29/2018)--supplement to k 4-significant decrease noted today--see new order Hypoxia (10/29/2018)--wean oxygen CONTINUOUS INCENT SPIR, AMBULATE W/ASSIST, AMBULATE W/ASSIST, RT-FLUTTER VALVE TX QID, SYNTHROID 25 MCG PO QD,  CARAFATE 0.5 MG PO AC & HS, KCL 40 MEQ PO X2, IV GOODY BAG 50 ML/HR, 
                     
                     
                     
                        [X] Treatment resistant pneumonia, Both: 
                            [X] Finding, >= One: 
                                [X] WBC >= 13,000/cu.mm(13x10 exp 9/L) 
                                ~--Admin, IQ Admin Admin on 11- 03:37 PM--~ 
                                WBC 11/2/18 14.1                                 WBC ON 11/3 NOT CHECKED 
                                 
                                 
                                 
                            [X] Intervention, >= One: 
                                [X] Anti-infective 
                                ~--Admin, IQ Admin Admin on 11- 03:38 PM--~ 
 UNASYN 3G IV Q 6 HRS, ZITHROMAX 500 MG PO Q 24 HRS 
                                 
                                 
                                 
  
Version: InterQual® 2018.1 Marcos Felt  © 2018 [employer] and/or one of its [place]. All Rights Reserved. CPT only © 2017 American Medical Association. All Rights Reserved.

## 2018-11-05 NOTE — PROGRESS NOTES
Problem: Falls - Risk of 
Goal: *Absence of Falls Document Jason Hunger Fall Risk and appropriate interventions in the flowsheet. Outcome: Progressing Towards Goal 
Fall Risk Interventions: 
  
 
  
 
Medication Interventions: Evaluate medications/consider consulting pharmacy Elimination Interventions: Call light in reach Problem: Pressure Injury - Risk of 
Goal: *Prevention of pressure injury Document Paras Scale and appropriate interventions in the flowsheet. Outcome: Progressing Towards Goal 
Pressure Injury Interventions: Activity Interventions: Increase time out of bed Nutrition Interventions: Document food/fluid/supplement intake

## 2018-11-05 NOTE — ROUTINE PROCESS
TRANSFER - OUT REPORT: 
 
Verbal report given to April, RN(name) on Erin Degree  being transferred to 8th floor(unit) for routine post - op Report consisted of patients Situation, Background, Assessment and  
Recommendations(SBAR). Information from the following report(s) SBAR, Kardex, Procedure Summary and MAR was reviewed with the receiving nurse. Lines:  
Peripheral IV 11/05/18 Right Antecubital (Active) Site Assessment Clean, dry, & intact 11/5/2018 11:00 AM  
Phlebitis Assessment 0 11/5/2018 11:00 AM  
Infiltration Assessment 0 11/5/2018 11:00 AM  
Dressing Status Clean, dry, & intact 11/5/2018 11:00 AM  
Dressing Type Transparent;Tape 11/5/2018 11:00 AM  
Hub Color/Line Status Blue; Infusing 11/5/2018 11:00 AM  
  
 
Opportunity for questions and clarification was provided. Patient transported with: 
 O2 @ 1 liters

## 2018-11-05 NOTE — PROGRESS NOTES
TRANSFER - IN REPORT: 
 
Verbal report received from April(name) on Elane Dry  being received from 803(unit) for routine progression of care Report consisted of patients Situation, Background, Assessment and  
Recommendations(SBAR). Information from the following report(s) SBAR and Kardex was reviewed with the receiving nurse. Opportunity for questions and clarification was provided. Assessment completed upon patients arrival to unit and care assumed.

## 2018-11-05 NOTE — PROGRESS NOTES
Patient drowsy, but oriented x4. Patient on 1 L NC has tachypnea but is in no apparent distress. Bilateral breath sounds coarse and diminished. IV patent and flushed. Patient reports pain in side that improves with repositioning. Bed in low, locked position. Will continue to monitor.

## 2018-11-06 VITALS
DIASTOLIC BLOOD PRESSURE: 63 MMHG | SYSTOLIC BLOOD PRESSURE: 95 MMHG | TEMPERATURE: 98.5 F | RESPIRATION RATE: 20 BRPM | HEART RATE: 94 BPM | OXYGEN SATURATION: 94 %

## 2018-11-06 LAB
ALBUMIN SERPL-MCNC: 2 G/DL (ref 3.5–5)
ALBUMIN/GLOB SERPL: 0.5 {RATIO} (ref 1.2–3.5)
ALP SERPL-CCNC: 101 U/L (ref 50–136)
ALT SERPL-CCNC: 21 U/L (ref 12–65)
ANION GAP SERPL CALC-SCNC: 6 MMOL/L (ref 7–16)
AST SERPL-CCNC: 22 U/L (ref 15–37)
BILIRUB SERPL-MCNC: 0.3 MG/DL (ref 0.2–1.1)
BUN SERPL-MCNC: 7 MG/DL (ref 6–23)
CALCIUM SERPL-MCNC: 8.3 MG/DL (ref 8.3–10.4)
CHLORIDE SERPL-SCNC: 103 MMOL/L (ref 98–107)
CO2 SERPL-SCNC: 29 MMOL/L (ref 21–32)
CREAT SERPL-MCNC: 0.39 MG/DL (ref 0.6–1)
GLOBULIN SER CALC-MCNC: 4.3 G/DL (ref 2.3–3.5)
GLUCOSE SERPL-MCNC: 93 MG/DL (ref 65–100)
MAGNESIUM SERPL-MCNC: 2.2 MG/DL (ref 1.8–2.4)
POTASSIUM SERPL-SCNC: 4 MMOL/L (ref 3.5–5.1)
PREALB SERPL-MCNC: 20.9 MG/DL (ref 18–35.7)
PROT SERPL-MCNC: 6.3 G/DL (ref 6.3–8.2)
SODIUM SERPL-SCNC: 138 MMOL/L (ref 136–145)

## 2018-11-06 PROCEDURE — 97530 THERAPEUTIC ACTIVITIES: CPT

## 2018-11-06 PROCEDURE — 84134 ASSAY OF PREALBUMIN: CPT

## 2018-11-06 PROCEDURE — 74011250636 HC RX REV CODE- 250/636: Performed by: INTERNAL MEDICINE

## 2018-11-06 PROCEDURE — 99232 SBSQ HOSP IP/OBS MODERATE 35: CPT | Performed by: INTERNAL MEDICINE

## 2018-11-06 PROCEDURE — 83735 ASSAY OF MAGNESIUM: CPT

## 2018-11-06 PROCEDURE — 36415 COLL VENOUS BLD VENIPUNCTURE: CPT

## 2018-11-06 PROCEDURE — 74011000258 HC RX REV CODE- 258: Performed by: INTERNAL MEDICINE

## 2018-11-06 PROCEDURE — 80053 COMPREHEN METABOLIC PANEL: CPT

## 2018-11-06 PROCEDURE — 74011250637 HC RX REV CODE- 250/637: Performed by: HOSPITALIST

## 2018-11-06 PROCEDURE — 74011250637 HC RX REV CODE- 250/637: Performed by: INTERNAL MEDICINE

## 2018-11-06 RX ORDER — PANTOPRAZOLE SODIUM 40 MG/1
40 TABLET, DELAYED RELEASE ORAL DAILY
Qty: 30 TAB | Refills: 1 | Status: SHIPPED | OUTPATIENT
Start: 2018-11-06 | End: 2018-12-07 | Stop reason: SDUPTHER

## 2018-11-06 RX ORDER — BISACODYL 5 MG
5 TABLET, DELAYED RELEASE (ENTERIC COATED) ORAL
Qty: 30 TAB | Refills: 0 | Status: SHIPPED | OUTPATIENT
Start: 2018-11-06

## 2018-11-06 RX ORDER — MIRTAZAPINE 15 MG/1
15 TABLET, FILM COATED ORAL
Qty: 30 TAB | Refills: 0 | Status: SHIPPED | OUTPATIENT
Start: 2018-11-06 | End: 2018-12-07

## 2018-11-06 RX ORDER — SUCRALFATE 1 G/10ML
1 SUSPENSION ORAL 4 TIMES DAILY
Qty: 414 ML | Refills: 1 | Status: SHIPPED | OUTPATIENT
Start: 2018-11-06 | End: 2018-12-07

## 2018-11-06 RX ADMIN — LANSOPRAZOLE 30 MG: 30 TABLET, ORALLY DISINTEGRATING, DELAYED RELEASE ORAL at 04:42

## 2018-11-06 RX ADMIN — SUCRALFATE 0.5 G: 1 SUSPENSION ORAL at 12:36

## 2018-11-06 RX ADMIN — AMPICILLIN SODIUM AND SULBACTAM SODIUM 3 G: 2; 1 INJECTION, POWDER, FOR SOLUTION INTRAMUSCULAR; INTRAVENOUS at 16:51

## 2018-11-06 RX ADMIN — SUCRALFATE 0.5 G: 1 SUSPENSION ORAL at 16:51

## 2018-11-06 RX ADMIN — ACETAMINOPHEN 650 MG: 325 TABLET, FILM COATED ORAL at 04:39

## 2018-11-06 RX ADMIN — LEVOTHYROXINE SODIUM 25 MCG: 50 TABLET ORAL at 04:42

## 2018-11-06 RX ADMIN — AMPICILLIN SODIUM AND SULBACTAM SODIUM 3 G: 2; 1 INJECTION, POWDER, FOR SOLUTION INTRAMUSCULAR; INTRAVENOUS at 08:52

## 2018-11-06 RX ADMIN — SUCRALFATE 0.5 G: 1 SUSPENSION ORAL at 04:45

## 2018-11-06 RX ADMIN — AMPICILLIN SODIUM AND SULBACTAM SODIUM 3 G: 2; 1 INJECTION, POWDER, FOR SOLUTION INTRAMUSCULAR; INTRAVENOUS at 03:22

## 2018-11-06 RX ADMIN — Medication 10 ML: at 04:46

## 2018-11-06 RX ADMIN — VITAMIN D, TAB 1000IU (100/BT) 2000 UNITS: 25 TAB at 08:52

## 2018-11-06 RX ADMIN — Medication 10 ML: at 13:36

## 2018-11-06 RX ADMIN — AZITHROMYCIN 500 MG: 250 TABLET, FILM COATED ORAL at 16:52

## 2018-11-06 RX ADMIN — GUAIFENESIN 600 MG: 600 TABLET, EXTENDED RELEASE ORAL at 08:52

## 2018-11-06 NOTE — PROGRESS NOTES
Patient slept well throughout the night. Patient asleep in bed at this time. Respirations present. No signs of distress at this time. Bed low and locked. Call light within reach. Will continue to monitor.

## 2018-11-06 NOTE — PROGRESS NOTES
Problem: Falls - Risk of 
Goal: *Absence of Falls Document Emilie Baumann Fall Risk and appropriate interventions in the flowsheet. Outcome: Progressing Towards Goal 
Fall Risk Interventions: 
  
 
  
 
Medication Interventions: Evaluate medications/consider consulting pharmacy Elimination Interventions: Call light in reach Problem: Pressure Injury - Risk of 
Goal: *Prevention of pressure injury Document Paras Scale and appropriate interventions in the flowsheet. Outcome: Progressing Towards Goal 
Pressure Injury Interventions: Activity Interventions: Pressure redistribution bed/mattress(bed type) Nutrition Interventions: Document food/fluid/supplement intake

## 2018-11-06 NOTE — PROGRESS NOTES
Problem: Mobility Impaired (Adult and Pediatric) Goal: *Acute Goals and Plan of Care (Insert Text) STG: 
(1.)Ms. Clinton Pimentel will move from supine to sit and sit to supine  with SUPERVISION within 3 treatment day(s). (2.)Ms. Clinton Pimentel will transfer from bed to chair and chair to bed with STAND BY ASSIST using the least restrictive device within 3 treatment day(s). (3.)Ms. Clinton Pimentel will ambulate with STAND BY ASSIST for 100 feet with the least restrictive device within 3 treatment day(s). LTG: 
(1.)Ms. Clinton Pimentel will move from supine to sit and sit to supine  in bed with INDEPENDENT within 7 treatment day(s). (2.)Ms. Clinton Pimentel will transfer from bed to chair and chair to bed with INDEPENDENT using the least restrictive device within 7 treatment day(s). (3.)Ms. Clinton Pimentel will ambulate with INDEPENDENT for 200+ feet with the least restrictive device within 7 treatment day(s). ________________________________________________________________________________________________ PHYSICAL THERAPY: Daily Note, Treatment Day: 2nd, AM 11/6/2018INPATIENT: Hospital Day: 9 Payor: ABSOLUTE TOTAL CARE / [place] [employer] / Product Type: Managed Care Medicaid /  
  
NAME/AGE/GENDER: Valerio Barr is a 45 y.o. female PRIMARY DIAGNOSIS: CAP (community acquired pneumonia) Hemoptysis Hypokalemia CAP (community acquired pneumonia) CAP (community acquired pneumonia) Procedure(s) (LRB): ESOPHAGOGASTRODUODENOSCOPY (EGD) (N/A) 1 Day Post-Op ICD-10: Treatment Diagnosis:  
 · Generalized Muscle Weakness (M62.81) · Difficulty in walking, Not elsewhere classified (R26.2) Precaution/Allergies: 
Flagyl [metronidazole] and Zyprexa [olanzapine] ASSESSMENT:  
Ms. Clinton Pimentel is supine in bed upon contact  and agreeable to work with PT. She is independent in bed mobility and with encouragement walked a little further today.   She used the walker and appears very self limiting and much more able than she willing to do. Could not talk her into sitting up in the chair. Working toward [employer]. This section established at most recent assessment PROBLEM LIST (Impairments causing functional limitations): 1. Decreased Strength 2. Decreased ADL/Functional Activities 3. Decreased Transfer Abilities 4. Decreased Ambulation Ability/Technique 5. Decreased Balance 6. Decreased Activity Tolerance 7. Decreased Pacing Skills 8. Increased Fatigue 9. Increased Shortness of Breath 10. Decreased [place] with Home Exercise Program 
11. Decreased Cognition INTERVENTIONS PLANNED: (Benefits and precautions of physical therapy have been discussed with the patient.) 1. Balance Exercise 2. Bed Mobility 3. Family Education 4. Gait Training 5. Home Exercise Program (HEP) 6. Neuromuscular Re-education/Strengthening 7. Therapeutic Activites 8. Therapeutic Exercise/Strengthening 9. Transfer Training TREATMENT PLAN: Frequency/Duration: 3 times a week for duration of hospital stay Rehabilitation Potential For Stated Goals: Fair RECOMMENDED REHABILITATION/EQUIPMENT: (at time of discharge pending progress): Due to the probability of continued deficits (see above) this patient will not likely need continued skilled physical therapy after discharge. Equipment:  
? None at this time HISTORY:  
History of Present Injury/Illness (Reason for Referral): 
See H&P below Patient is a 45 y/o female with hx GERD, anxiety, depression, hypothyroidism, anorexia who presents to ED with 1 week of progressive shortness of breath, cough and now hemoptysis. Was seen last week and thought she had gastritis and received medications for this. Saw her PCP today and was tachypneic, tachycardic with chest pain and hemoptysis. Then sent to ED to r/o PE or pneumonia.  CT of chest negative for PE but she has extensive consolidation in left lower lobe and opacities in RLL, lingula and LLL. O2 sats were 87% on RA. She has no known lung disease such as asthma or COPD but does smoke. She received IV rocephin and azithromycin in ED. Hospitalist service consulted for admission and further treatment.  
  
 
 
Past Medical History/Comorbidities: Ms. Clinton Pimentel  has a past medical history of Anorexia, Anxiety, Depressive disorder, not elsewhere classified, GERD (gastroesophageal reflux disease), Loss of appetite, Pneumonia, Unspecified vitamin B deficiency, and Urinary tract infection, site not specified. Ms. Clinton Pimentel  has a past surgical history that includes hx lap cholecystectomy (2005); hx tubal ligation (2001); hx cyst incision and drainage (Right); hx colonoscopy (last 2007); hx gi; ESOPHAGOGASTRODUODENOSCOPY (EGD) (N/A, 11/5/2018); ULTRASOUND (Bilateral, 11/2/2018); and PRONE LATERAL INTERNAL SPHINCTEROTOMY (N/A, 9/9/2015). Social History/Living Environment:  
Home Environment: Private residence # Steps to Enter: 2 One/Two Story Residence: One story Living Alone: No 
Support Systems: Spouse/Significant Other/Partner, Parent Patient Expects to be Discharged to[de-identified] Private residence Current DME Used/Available at Home: None Prior Level of Function/Work/Activity: 
Lives with , support of parents, indep with mobility Number of Personal Factors/Comorbidities that affect the Plan of Care: 3+: HIGH COMPLEXITY EXAMINATION:  
Most Recent Physical Functioning:  
Gross Assessment: 
  
         
  
Posture: 
  
Balance: 
  Bed Mobility: 
Supine to Sit: Modified independent Sit to Supine: Modified independent Wheelchair Mobility: 
  
Transfers: 
Sit to Stand: Stand-by assistance Stand to Sit: Independent Gait: 
  
Speed/Twyla: Shuffled; Slow Distance (ft): 70 Feet (ft) Assistive Device: Walker, rolling Ambulation - Level of Assistance: Stand-by assistance Body Structures Involved: 1. Nerves 2. Heart 3. Lungs 4. Bones 5. Muscles Body Functions Affected: 1. Mental 
2. Cardio 3. Respiratory 4. Neuromusculoskeletal 
5. Movement Related Activities and Participation Affected: 1. Learning and Applying Knowledge 2. General Tasks and Demands 3. Mobility 4. Self Care 5. Domestic Life 6. Interpersonal Interactions and Relationships 7. Community, Social and [place] Number of elements that affect the Plan of Care: 4+: HIGH COMPLEXITY CLINICAL PRESENTATION:  
Presentation: Evolving clinical presentation with changing clinical characteristics: MODERATE COMPLEXITY CLINICAL DECISION MAKING:  
[facility] AM-PAC 6 Clicks Basic Mobility Inpatient Short Form How much difficulty does the patient currently have. .. Unable A Lot A Little None 1. Turning over in bed (including adjusting bedclothes, sheets and blankets)? [] 1   [] 2   [x] 3   [] 4  
2. Sitting down on and standing up from a chair with arms ( e.g., wheelchair, bedside commode, etc.)   [] 1   [] 2   [x] 3   [] 4  
3. Moving from lying on back to sitting on the side of the bed? [] 1   [] 2   [x] 3   [] 4 How much help from another person does the patient currently need. .. Total A Lot A Little None 4. Moving to and from a bed to a chair (including a wheelchair)? [] 1   [] 2   [x] 3   [] 4  
5. Need to walk in hospital room? [] 1   [] 2   [x] 3   [] 4  
6. Climbing 3-5 steps with a railing? [] 1   [] 2   [x] 3   [] 4  
© 2007, Trustees of [facility], under license to [employer]. All rights reserved Score:  Initial: 18 Most Recent: X (Date: -- ) Interpretation of Tool:  Represents activities that are increasingly more difficult (i.e. Bed mobility, Transfers, Gait). Score 24 23 22-20 19-15 14-10 9-7 6 Modifier CH CI CJ CK CL CM CN   
 
? Mobility - Walking and Moving Around:  
  - CURRENT STATUS: CK - 40%-59% impaired, limited or restricted  - GOAL STATUS: CJ - 20%-39% impaired, limited or restricted  - D/C STATUS:  ---------------To be determined--------------- Payor: ABSOLUTE TOTAL CARE / [place] / Product Type: Managed Care Medicaid /   
 
Medical Necessity:    
· Patient is expected to demonstrate progress in strength, balance, coordination and functional technique to decrease assistance required with gait, transfers, and functional mobility. [place] Reason for Services/Other Comments: 
· Patient continues to require skilled intervention due to decreased strength, decreased balance, decreased functional tolerance, decreased cardiopulmonary endurance affecting participation in basic ADLs and functional tasks. Use of outcome tool(s) and clinical judgement create a POC that gives a: Clear prediction of patient's progress: LOW COMPLEXITY  
  
 
 
 
TREATMENT:  
(In addition to Assessment/Re-Assessment sessions the following treatments were rendered) Pre-treatment Symptoms/Complaints: \"I will go to the door and back\" Pain: Initial:  
Pain Intensity 1: 0  Post Session:  0/10 Therapeutic Activity: (    10 minutes): Therapeutic activities including Bed transfers and Ambulation on level ground and cues for walker management and posture to improve mobility, strength, balance and coordination. Required minimal assist with use of the walker to promote static and dynamic balance in standing. Braces/Orthotics/Lines/Etc:  
· IV Treatment/Session Assessment:   
· Response to Treatment:  Tolerated well · Interdisciplinary Collaboration:  
o Physical Therapy Assistant 
o Registered Nurse · After treatment position/precautions:  
o Supine in bed 
o Bed/Chair-wheels locked 
o Bed in low position 
o Call light within reach 
o RN notified · Compliance with Program/Exercises: Compliant most of the time · Recommendations/Intent for next treatment session: \"Next visit will focus on increasing gait distance\". Total Treatment Duration: PT Patient Time In/Time Out Time In: 1010 Time Out: 1020 Yoselyn Smith PTA

## 2018-11-06 NOTE — DISCHARGE INSTRUCTIONS
DISCHARGE SUMMARY from Nurse    PATIENT INSTRUCTIONS:    After general anesthesia or intravenous sedation, for 24 hours or while taking prescription Narcotics:  · Limit your activities  · Do not drive and operate hazardous machinery  · Do not make important personal or business decisions  · Do  not drink alcoholic beverages  · If you have not urinated within 8 hours after discharge, please contact your surgeon on call. What to do at Home:  Recommended activity: Activity as tolerated. If you experience any of the following symptoms temp > 101.5, worseing cough or wheezing, shortness of breath or fatigue not relieved with rest, please follow up with MD.    *  Please give a list of your current medications to your Primary Care Provider. *  Please update this list whenever your medications are discontinued, doses are      changed, or new medications (including over-the-counter products) are added. *  Please carry medication information at all times in case of emergency situations. These are general instructions for a healthy lifestyle:    No smoking/ No tobacco products/ Avoid exposure to second hand smoke  Surgeon General's Warning:  Quitting smoking now greatly reduces serious risk to your health. Obesity, smoking, and sedentary lifestyle greatly increases your risk for illness    A healthy diet, regular physical exercise & weight monitoring are important for maintaining a healthy lifestyle    You may be retaining fluid if you have a history of heart failure or if you experience any of the following symptoms:  Weight gain of 3 pounds or more overnight or 5 pounds in a week, increased swelling in our hands or feet or shortness of breath while lying flat in bed. Please call your doctor as soon as you notice any of these symptoms; do not wait until your next office visit.     Recognize signs and symptoms of STROKE:    F-face looks uneven    A-arms unable to move or move unevenly    S-speech slurred or non-existent    T-time-call 911 as soon as signs and symptoms begin-DO NOT go       Back to bed or wait to see if you get better-TIME IS BRAIN. Warning Signs of HEART ATTACK     Call 911 if you have these symptoms:   Chest discomfort. Most heart attacks involve discomfort in the center of the chest that lasts more than a few minutes, or that goes away and comes back. It can feel like uncomfortable pressure, squeezing, fullness, or pain.  Discomfort in other areas of the upper body. Symptoms can include pain or discomfort in one or both arms, the back, neck, jaw, or stomach.  Shortness of breath with or without chest discomfort.  Other signs may include breaking out in a cold sweat, nausea, or lightheadedness. Don't wait more than five minutes to call 911 - MINUTES MATTER! Fast action can save your life. Calling 911 is almost always the fastest way to get lifesaving treatment. Emergency Medical Services staff can begin treatment when they arrive -- up to an hour sooner than if someone gets to the hospital by car. The discharge information has been reviewed with the patient. The patient verbalized understanding. Discharge medications reviewed with the patient and appropriate educational materials and side effects teaching were provided. Pneumonia: Care Instructions  Your Care Instructions    Pneumonia is an infection of the lungs. Most cases are caused by infections from bacteria or viruses. Pneumonia may be mild or very severe. If it is caused by bacteria, you will be treated with antibiotics. It may take a few weeks to a few months to recover fully from pneumonia, depending on how sick you were and whether your overall health is good. Follow-up care is a key part of your treatment and safety. Be sure to make and go to all appointments, and call your doctor if you are having problems. It's also a good idea to know your test results and keep a list of the medicines you take.   How can you care for yourself at home? · Take your antibiotics exactly as directed. Do not stop taking the medicine just because you are feeling better. You need to take the full course of antibiotics. · Take your medicines exactly as prescribed. Call your doctor if you think you are having a problem with your medicine. · Get plenty of rest and sleep. You may feel weak and tired for a while, but your energy level will improve with time. · To prevent dehydration, drink plenty of fluids, enough so that your urine is light yellow or clear like water. Choose water and other caffeine-free clear liquids until you feel better. If you have kidney, heart, or liver disease and have to limit fluids, talk with your doctor before you increase the amount of fluids you drink. · Take care of your cough so you can rest. A cough that brings up mucus from your lungs is common with pneumonia. It is one way your body gets rid of the infection. But if coughing keeps you from resting or causes severe fatigue and chest-wall pain, talk to your doctor. He or she may suggest that you take a medicine to reduce the cough. · Use a vaporizer or humidifier to add moisture to your bedroom. Follow the directions for cleaning the machine. · Do not smoke or allow others to smoke around you. Smoke will make your cough last longer. If you need help quitting, talk to your doctor about stop-smoking programs and medicines. These can increase your chances of quitting for good. · Take an over-the-counter pain medicine, such as acetaminophen (Tylenol), ibuprofen (Advil, Motrin), or naproxen (Aleve). Read and follow all instructions on the label. · Do not take two or more pain medicines at the same time unless the doctor told you to. Many pain medicines have acetaminophen, which is Tylenol. Too much acetaminophen (Tylenol) can be harmful. · If you were given a spirometer to measure how well your lungs are working, use it as instructed.  This can help your doctor tell how your recovery is going. · To prevent pneumonia in the future, talk to your doctor about getting a flu vaccine (once a year) and a pneumococcal vaccine (one time only for most people). When should you call for help? Call 911 anytime you think you may need emergency care. For example, call if:    · You have severe trouble breathing.    Call your doctor now or seek immediate medical care if:    · You cough up dark brown or bloody mucus (sputum).     · You have new or worse trouble breathing.     · You are dizzy or lightheaded, or you feel like you may faint.    Watch closely for changes in your health, and be sure to contact your doctor if:    · You have a new or higher fever.     · You are coughing more deeply or more often.     · You are not getting better after 2 days (48 hours).     · You do not get better as expected. Where can you learn more? Go to http://anjali-jaci.info/. Enter [id number] in the search box to learn more about \"Pneumonia: Care Instructions. \"  Current as of: December 6, 2017  Content Version: 11.8  © 4138-2903 [employer]. Care instructions adapted under license by [employer] (which disclaims liability or warranty for this information). If you have questions about a medical condition or this instruction, always ask your healthcare professional. [address] any warranty or liability for your use of this information.       ___________________________________________________________________________________________________________________________________

## 2018-11-06 NOTE — PROGRESS NOTES
GI DAILY PROGRESS NOTE Admit Date:  10/29/2018 Today's Date:  11/6/2018 CC:  GERD Subjective:  
 
EGD yesterday was normal.  
Denies N/V, abdominal pain, or GERD/heartburn symptoms at this time. Tolerates diet. Appetite improving. Father at bedside. Medications:  
Current Facility-Administered Medications Medication Dose Route Frequency  mirtazapine (REMERON) tablet 15 mg  15 mg Oral QHS  dextrose 5 % - 0.45% NaCl 1,000 mL with potassium chloride 20 mEq, thiamine 100 mg, mvi, adult no. 4 with vit K 10 mL, calcium chloride 1,000 mg infusion   IntraVENous CONTINUOUS  
 lansoprazole (PREVACID SOLUTAB) disintegrating tablet 30 mg  30 mg Oral ACB  azithromycin (ZITHROMAX) tablet 500 mg  500 mg Oral Q24H  
 levothyroxine (SYNTHROID) tablet 25 mcg  25 mcg Oral 6am  
 cholecalciferol (VITAMIN D3) tablet 2,000 Units  2,000 Units Oral DAILY  QUEtiapine (SEROquel) tablet 200 mg  200 mg Oral QHS  sucralfate (CARAFATE) 100 mg/mL oral suspension 0.5 g  0.5 g Oral AC&HS  promethazine (PHENERGAN) tablet 25 mg  25 mg Oral Q6H PRN  
 ondansetron (ZOFRAN) injection 4 mg  4 mg IntraVENous Q4H PRN  
 ampicillin-sulbactam (UNASYN) 3 g in 0.9% sodium chloride (MBP/ADV) 100 mL  3 g IntraVENous Q6H  
 sodium chloride (NS) flush 5-10 mL  5-10 mL IntraVENous Q8H  
 sodium chloride (NS) flush 5-10 mL  5-10 mL IntraVENous PRN  
 acetaminophen (TYLENOL) tablet 650 mg  650 mg Oral Q4H PRN  
 naloxone (NARCAN) injection 0.4 mg  0.4 mg IntraVENous PRN  
 albuterol (PROVENTIL VENTOLIN) nebulizer solution 2.5 mg  2.5 mg Nebulization Q4H PRN  
 bisacodyl (DULCOLAX) tablet 5 mg  5 mg Oral DAILY PRN  
 guaiFENesin ER (MUCINEX) tablet 600 mg  600 mg Oral Q12H  
 HYDROcodone-homatropine (HYCODAN) 5-1.5 mg/5 mL (5 mL) syrup 5 mL  5 mL Oral Q4H PRN Review of Systems: ROS was obtained, with pertinent positives as listed above. No chest pain or SOB. Objective:  
Vitals: 
Visit Vitals BP 96/60 Pulse 85 Temp 98.5 °F (36.9 °C) Resp 21 SpO2 94% Intake/Output: 
11/06 0701 - 11/06 1900 In: 120 [P.O.:120] Out: -  
11/04 1901 - 11/06 0700 In: 738 [I.V.:738] Out: 1201 [Urine:1201] Exam: 
General appearance: alert, cooperative, no distress Lungs: clear to auscultation bilaterally anteriorly Heart: regular rate and rhythm Abdomen: soft, non-tender. Bowel sounds normal. No masses, no organomegaly Extremities: extremities normal, atraumatic, no cyanosis or edema Neuro:  alert and oriented Data Review (Labs):   
Recent Labs 11/06/18 
0603 11/04/18 
0543 WBC  --  8.9 HGB  --  9.4* HCT  --  28.1*  
PLT  --  509* MCV  --  92.1  142  
K 4.0 4.4  107 CO2 29 30 BUN 7 1* CREA 0.39* 0.34* CA 8.3 8.1*  
MG 2.2  --   
GLU 93 100   --   
SGOT 22  --   
ALT 21  --   
TBILI 0.3  --   
ALB 2.0*  --   
TP 6.3  -- Assessment:  
 
Principal Problem: 
CAP (community acquired pneumonia) (10/29/2018) Active Problems: 
Esophageal reflux (4/9/2015) Hypokalemia (10/29/2018) Hemoptysis (10/29/2018) Hypoxia (10/29/2018) Tachycardia (10/29/2018) Protein-calorie malnutrition, moderate ([address] (10/31/2018) AST elevation may be muscle, repeat LFTs, normal.  
 
EGD 11/5 w/ Dr. Malick Granados FINDINGS: 
ESOPHAGUS: Normal 
STOMACH: Normal 
DUODENUM:  Normal 
 
45 y.o. female with PMH of anorexia, GERD and impaired cognitive abilities on disability admitted for pneumonia that reports frequent N/V with meals and NSAID use. No reported melena/tarry stools. Patient on Unasyn and Azithromycin. 11/6: GI asymptomatic. Plan:  
 
Continue PPI & Carafate Will s/o & follow-up as outpatient in 2-3 mos for progress report. Pls call w/ any questions. Sofia Sevilla PA-C [facility]

## 2018-11-06 NOTE — PROGRESS NOTES
Pauly Pastor Admission Date: 10/29/2018 Daily Progress Note: 11/6/2018 The patient's chart is reviewed and the patient is discussed with the staff. 
 
45 y.o. CF evaluated at the request of Dr. Dunia Zayas mental delay with reported 2nd grade education equivalent but is reported to be independent, h/o smoking-quit 1 year ago and has been vaping for the past year. H/o pneumonia in 2015, denies h/o asthma or COPD. About 4 days ago began to feel poorly with subjective fever, cough and shortness of breath. Was admitted and CXR and CT showing pneumonia L>R bases. She reported an episode of scant hemoptysis but not produced much over the past 16 hours. She required O2 and has been weaned to 10L HFNC at this time. She reports history of GERD and previously was on PPI. Has HOB elevated and since doing has less reflux symptoms. Has had aspiration episodes in past with reflux, but no overt pneumonia and none recently. Stays at home all day, doesn't work, no infectious contacts.  perhaps had a viral GI infection past week.  
 
Subjective:  
 
Walked today with PT in hallway off O2. Appeared to be breathing well. States overall improved. Still doesn't want to get out of bed. Current Facility-Administered Medications Medication Dose Route Frequency  mirtazapine (REMERON) tablet 15 mg  15 mg Oral QHS  dextrose 5 % - 0.45% NaCl 1,000 mL with potassium chloride 20 mEq, thiamine 100 mg, mvi, adult no. 4 with vit K 10 mL, calcium chloride 1,000 mg infusion   IntraVENous CONTINUOUS  
 lansoprazole (PREVACID SOLUTAB) disintegrating tablet 30 mg  30 mg Oral ACB  azithromycin (ZITHROMAX) tablet 500 mg  500 mg Oral Q24H  
 levothyroxine (SYNTHROID) tablet 25 mcg  25 mcg Oral 6am  
 cholecalciferol (VITAMIN D3) tablet 2,000 Units  2,000 Units Oral DAILY  QUEtiapine (SEROquel) tablet 200 mg  200 mg Oral QHS  sucralfate (CARAFATE) 100 mg/mL oral suspension 0.5 g  0.5 g Oral AC&HS  promethazine (PHENERGAN) tablet 25 mg  25 mg Oral Q6H PRN  
 ondansetron (ZOFRAN) injection 4 mg  4 mg IntraVENous Q4H PRN  
 ampicillin-sulbactam (UNASYN) 3 g in 0.9% sodium chloride (MBP/ADV) 100 mL  3 g IntraVENous Q6H  
 sodium chloride (NS) flush 5-10 mL  5-10 mL IntraVENous Q8H  
 sodium chloride (NS) flush 5-10 mL  5-10 mL IntraVENous PRN  
 acetaminophen (TYLENOL) tablet 650 mg  650 mg Oral Q4H PRN  
 naloxone (NARCAN) injection 0.4 mg  0.4 mg IntraVENous PRN  
 albuterol (PROVENTIL VENTOLIN) nebulizer solution 2.5 mg  2.5 mg Nebulization Q4H PRN  
 bisacodyl (DULCOLAX) tablet 5 mg  5 mg Oral DAILY PRN  
 guaiFENesin ER (MUCINEX) tablet 600 mg  600 mg Oral Q12H  
 HYDROcodone-homatropine (HYCODAN) 5-1.5 mg/5 mL (5 mL) syrup 5 mL  5 mL Oral Q4H PRN Review of Systems Constitutional: negative for fever, chills, sweats Cardiovascular: negative for chest pain, palpitations, syncope, edema Gastrointestinal: negative for dysphagia, reflux, vomiting, diarrhea, abdominal pain, or melena Neurologic: negative for focal weakness, numbness, headache Objective:  
 
Vitals:  
 11/05/18 2117 11/06/18 0041 11/06/18 4707 11/06/18 8334 BP:  101/72 98/62 96/60 Pulse:  83 81 85 Resp:  19 19 21 Temp:  98.9 °F (37.2 °C) 99.2 °F (37.3 °C) 98.5 °F (36.9 °C) SpO2: 95% 90% 92% 94% Intake and Output:  
11/04 1901 - 11/06 0700 In: 738 [I.V.:738] Out: 1201 [Urine:1201] 11/06 0701 - 11/06 1900 In: 120 [P.O.:120] Out: - Physical Exam:  
Constitution:  the patient is well developed and in no acute distress EENMT:  Sclera clear, pupils equal, oral mucosa moist 
Respiratory: CTA B in anterior lung fields. Cardiovascular:  RRR without M,G,R 
Gastrointestinal: soft and non-tender; with positive bowel sounds. Musculoskeletal: warm without cyanosis. There is no lower leg edema. Skin:  no jaundice or rashes, no wounds Neurologic: no gross neuro deficits Psychiatric:  alert and oriented x ppt CHEST XRAY: 11/5: lower lobe infiltrate, effusions appear improving by lateral 
 
 
11/1 LAB No lab exists for component: [place] Recent Labs 11/04/18 
0543 WBC 8.9 HGB 9.4* HCT 28.1*  
* Recent Labs 11/06/18 
0603 11/04/18 
0543  142  
K 4.0 4.4  107 CO2 29 30  
GLU 93 100 BUN 7 1* CREA 0.39* 0.34* MG 2.2  --   
CA 8.3 8.1* ALB 2.0*  --   
SGOT 22  -- No results for input(s): PH, PCO2, PO2, HCO3 in the last 72 hours. MICRO Blood - negative Sputum - normal jermain Assessment:  (Medical Decision Making) Hospital Problems  Date Reviewed: 11/1/2018 Codes Class Noted POA Protein-calorie malnutrition, moderate (HCC) ICD-10-CM: E44.0 ICD-9-CM: 263.0  10/31/2018 Unknown Hypokalemia ICD-10-CM: E87.6 ICD-9-CM: 276.8  10/29/2018 Yes * (Principal) CAP (community acquired pneumonia) ICD-10-CM: J18.9 ICD-9-CM: 986  10/29/2018 Yes Hemoptysis ICD-10-CM: R04.2 ICD-9-CM: 786.30  10/29/2018 Yes Hypoxia ICD-10-CM: R09.02 
ICD-9-CM: 799.02  10/29/2018 Yes Tachycardia ICD-10-CM: R00.0 ICD-9-CM: 785.0  10/29/2018 Yes Esophageal reflux ICD-10-CM: K21.9 ICD-9-CM: 530.81  4/9/2015 Yes Patient with CAP. WBC normalized. Hypoxia nearly resolved. Clinically improving. Plan:  (Medical Decision Making) -repeat CXR yesterday appears stable to improved 
-ambulated off O2 
-completes abx today 
-okay to discharge from pulmonary standpoint, will sign off 
-should have follow up CXR in 4-6 weeks to assure infiltrates resolved Steff Sharpe MD

## 2018-11-06 NOTE — PROGRESS NOTES
Received report form DASIA Garcia. Patient awake in bed. Respirations present. No signs of distress at this time. Bed low and locked. Call light within reach. Will continue to monitor.

## 2018-11-06 NOTE — PROGRESS NOTES
Problem: Interdisciplinary Rounds Goal: Interdisciplinary Rounds Interdisciplinary team rounds were held 11/6/2018 with the following team members:Care Management, Physical Therapy, Physician and Clinical Coordinator and the patient. Plan of care discussed. See clinical pathway and/or care plan for interventions and desired outcomes.

## 2018-11-06 NOTE — PROGRESS NOTES
Patient is discharging home this afternoon. She will require outpatient physical therapy at discharge. Orders faxed to central scheduling for outpatient PT. No other discharge planning needs identified at this time. Care Management Interventions PCP Verified by CM: Yes Transition of Care Consult (CM Consult): Discharge Planning Discharge Durable Medical Equipment: No 
Physical Therapy Consult: No 
Occupational Therapy Consult: No 
Speech Therapy Consult: No 
Current Support Network: Own Home, Lives with Spouse, Family Lives [place] Confirm Follow Up Transport: Family Plan discussed with Pt/Family/Caregiver: Yes Freedom of Choice Offered: Yes Discharge Location Discharge Placement: Home

## 2018-11-06 NOTE — PROGRESS NOTES
Discharge instructions, follow up information,medication list, and prescriptions provided and explained to the pt. Patient is receiving last dose of IV antibiotics. Primary RN to removed IV once antibiotics completed. Opportunity for questions provided. Instructed to call once ready to leave.

## 2018-11-06 NOTE — PROGRESS NOTES
Patient with increased appetite. States she enjoyed her lunch today with no complaints of nausea. She will likely discharge home within the next day. Case Management will follow for discharge planning needs. Care Management Interventions PCP Verified by CM: Yes Transition of Care Consult (CM Consult): Discharge Planning Discharge Durable Medical Equipment: No 
Physical Therapy Consult: No 
Occupational Therapy Consult: No 
Speech Therapy Consult: No 
Current Support Network: Own Home, Lives with Spouse, Family Lives [place] Confirm Follow Up Transport: Family Plan discussed with Pt/Family/Caregiver: Yes Freedom of Choice Offered: Yes Discharge Location Discharge Placement: Home

## 2018-11-06 NOTE — DISCHARGE SUMMARY
Hospitalist Discharge Summary     Admit Date:  10/29/2018  6:38 PM   Name:  Tello Carmona   Age:  45 y.o.  :  1980   MRN:  [id number]   PCP:  Marylen Seed, DO  Treatment Team: Attending Provider: Nuha Swift MD; Care Manager: Myles Davila, DASIA; Utilization Review: Carlyn Levin; Consulting Provider: Jacob Alas RN; Consulting Provider: Pradeep Reddy MD    Problem List for this Hospitalization:  Hospital Problems as of 2018 Date Reviewed: 2018          Codes Class Noted - Resolved POA    Protein-calorie malnutrition, moderate ([address] ICD-10-CM: E44.0  ICD-9-CM: 263.0  10/31/2018 - Present Unknown        Hypokalemia ICD-10-CM: E87.6  ICD-9-CM: 276.8  10/29/2018 - Present Yes        * (Principal) CAP (community acquired pneumonia) ICD-10-CM: J18.9  ICD-9-CM: 486  10/29/2018 - Present Yes        Hemoptysis ICD-10-CM: R04.2  ICD-9-CM: 786.30  10/29/2018 - Present Yes        Hypoxia ICD-10-CM: R09.02  ICD-9-CM: 799.02  10/29/2018 - Present Yes        Tachycardia ICD-10-CM: R00.0  ICD-9-CM: 785.0  10/29/2018 - Present Yes        Anorexia nervosa ICD-10-CM: F50.00  ICD-9-CM: 307.1  2015 - Present         Esophageal reflux ICD-10-CM: K21.9  ICD-9-CM: 530.81  2015 - Present Yes                Admission HPI from 10/29/2018:    \" please refer to HPI for more details  \". Hospital Course:    43 y/o female with hx GERD, anxiety, depression, hypothyroidism, anorexia who presents to ED with 1 week of progressive shortness of breath, cough and  hemoptysis. Saw her PCP today and was tachypneic, tachycardic with chest pain and hemoptysis. Then sent to ED to r/o PE or pneumonia. CT of chest negative for PE but she has extensive consolidation in left lower lobe and opacities in RLL, lingula and LLL. O2 sats were 87% on RA. Pulmonary evaluate patient and adjusted abxs.  Patient clinically improved with resolution of acute hypoxic respi failure and completed treatment for pneumonia while inpatient. GI evaluated patient for EGD that was normal. Appetite improved with remeron. Patient is now stable to be discharged home to follow up outpatient. Needs repeated CXR 4-6 weeks with PCP to check for resolution of pneumonia. Follow up instructions below. Plan was discussed with patient/father/careteam.  All questions answered. Patient was stable at time of discharge and was instructed to call or return if there are any concerns or recurrence of symptoms. Diagnostic Imaging/Tests: All Micro Results     Procedure Component Value Units Date/Time    CULTURE, BLOOD [[id number]] Collected:  10/30/18 1252    Order Status:  Completed Specimen:  Blood Updated:  11/04/18 0954     Special Requests: --        RIGHT  Antecubital       Culture result: NO GROWTH 5 DAYS       CULTURE, BLOOD [[id number]] Collected:  10/30/18 1302    Order Status:  Completed Specimen:  Blood Updated:  11/04/18 0954     Special Requests: --        LEFT  HAND       Culture result: NO GROWTH 5 DAYS       CULTURE, RESPIRATORY/SPUTUM/BRONCH [place] STAIN [[id number]] Collected:  10/30/18 1827    Order Status:  Completed Specimen:  Sputum Updated:  11/02/18 0803     Special Requests: NO SPECIAL REQUESTS        GRAM STAIN NO DEFINITE ORGANISM SEEN         22 TO 47 WBC'S SEEN PER OIF      NO EPITHELIAL CELLS SEEN         4+ MUCUS PRESENT        Culture result: SCANT YEAST               SCANT NORMAL RESPIRATORY ALF          DARRICK Gonsalez, UR/CSF [[id number]] Collected:  10/29/18 1927    Order Status:  Completed Specimen:  Other from Miscellaneous sample Updated:  11/01/18 1438     Source URINE        Specimen Urine     Streptococcus pneumoniae Ag NEGATIVE         Fluid culture Not Indicated     Organism ID Not indicated. Please note Comment        Comment: (NOTE)  College of [employer]ologists standards require a culture to be  performed on CSF specimens submitted for bacterial antigen testing.   (CAP [id number]) Urine specimens will not be cultured.   Performed At: [address] [id number]  Elmira Gallagher MD EA:[id number]         AFB CULTURE + SMEAR W/RFLX ID FROM CULTURE [[id number]] Collected:  10/30/18 1215    Order Status:  Canceled     FUNGUS CULTURE AND SMEAR [[id number]] Collected:  10/30/18 1215    Order Status:  Canceled Specimen:  Other           Labs: Results:       BMP, Mg, Phos Recent Labs     11/06/18  0603 11/04/18  0543    142   K 4.0 4.4    107   CO2 29 30   AGAP 6* 5*   BUN 7 1*   CREA 0.39* 0.34*   CA 8.3 8.1*   GLU 93 100   MG 2.2  --       CBC Recent Labs     11/04/18  0543   WBC 8.9   RBC 3.05*   HGB 9.4*   HCT 28.1*   *   GRANS 71   LYMPH 15*   MONOS 6   ANEU 7.1   ABL 1.3   ABM 0.5      LFT Recent Labs     11/06/18  0603   SGOT 22   ALT 21      TP 6.3   ALB 2.0*   GLOB 4.3*   AGRAT 0.5*      Cardiac Testing Lab Results   Component Value Date/Time    Troponin-I, Qt. <0.02 (L) 11/02/2018 05:16 AM    Troponin-I, Qt. <0.02 (L) 11/01/2018 06:14 PM    Troponin-I, Qt. <0.02 (L) 11/01/2018 10:23 AM      Coagulation Tests No results found for: PTP, INR, APTT   A1c No results found for: HBA1C, HGBE8, PYQ9YOVV, BNP8IPBF   Lipid Panel Lab Results   Component Value Date/Time    Cholesterol, total 191 09/27/2017 09:39 AM    HDL Cholesterol 36 (L) 09/27/2017 09:39 AM    LDL, calculated 130 (H) 09/27/2017 09:39 AM    VLDL, calculated 25 09/27/2017 09:39 AM    Triglyceride 127 09/27/2017 09:39 AM      Thyroid Panel Lab Results   Component Value Date/Time    T4, Total 7.7 12/07/2015 03:23 PM    TSH 7.150 (H) 11/02/2018 05:16 AM    TSH 7.810 (H) 06/26/2018 10:18 AM    TSH 5.520 (H) 12/27/2017 09:05 AM    TSH 1.420 12/07/2015 03:23 PM        Most Recent UA No results found for: COLOR, APPRN, REFSG, CHINTAN, PROTU, GLUCU, KETU, BILU, BLDU, UROU, CARLOS, LEUKU     Allergies   Allergen Reactions    Flagyl [Metronidazole] Nausea and Vomiting    Zyprexa [Olanzapine] Other (comments)     Upset stomach     Immunization History   Administered Date(s) Administered    Influenza Vaccine 10/07/2015, 10/03/2017    Influenza Vaccine (Quad) PF 10/04/2016, 10/18/2018       All Labs from Last 24 Hrs:  Recent Results (from the past 24 hour(s))   MAGNESIUM    Collection Time: 11/06/18  6:03 AM   Result Value Ref Range    Magnesium 2.2 1.8 - 2.4 mg/dL   PREALBUMIN    Collection Time: 11/06/18  6:03 AM   Result Value Ref Range    Prealbumin 20.9 18.0 - 61.8 MG/DL   METABOLIC PANEL, COMPREHENSIVE    Collection Time: 11/06/18  6:03 AM   Result Value Ref Range    Sodium 138 136 - 145 mmol/L    Potassium 4.0 3.5 - 5.1 mmol/L    Chloride 103 98 - 107 mmol/L    CO2 29 21 - 32 mmol/L    Anion gap 6 (L) 7 - 16 mmol/L    Glucose 93 65 - 100 mg/dL    BUN 7 6 - 23 MG/DL    Creatinine 0.39 (L) 0.6 - 1.0 MG/DL    GFR est AA >60 >60 ml/min/1.73m2    GFR est non-AA >60 >60 ml/min/1.73m2    Calcium 8.3 8.3 - 10.4 MG/DL    Bilirubin, total 0.3 0.2 - 1.1 MG/DL    ALT (SGPT) 21 12 - 65 U/L    AST (SGOT) 22 15 - 37 U/L    Alk.  phosphatase 101 50 - 136 U/L    Protein, total 6.3 6.3 - 8.2 g/dL    Albumin 2.0 (L) 3.5 - 5.0 g/dL    Globulin 4.3 (H) 2.3 - 3.5 g/dL    A-G Ratio 0.5 (L) 1.2 - 3.5         Discharge Exam:  Patient Vitals for the past 24 hrs:   Temp Pulse Resp BP SpO2   11/06/18 1550 98.5 °F (36.9 °C) 94 20 95/63 94 %   11/06/18 1252 98 °F (36.7 °C) 89 20 100/64 93 %   11/06/18 0738 98.5 °F (36.9 °C) 85 21 96/60 94 %   11/06/18 0337 99.2 °F (37.3 °C) 81 19 98/62 92 %   11/06/18 0041 98.9 °F (37.2 °C) 83 19 101/72 90 %   11/05/18 2117     95 %   11/05/18 2016 98.1 °F (36.7 °C) 87 19 95/60 96 %     Oxygen Therapy  O2 Sat (%): 94 % (11/06/18 1550)  Pulse via Oximetry: 90 beats per minute (11/05/18 2117)  O2 Device: Room air (11/05/18 2117)  O2 Flow Rate (L/min): 1 l/min (11/05/18 1204)  FIO2 (%): 21 % (11/05/18 2117)  ETCO2 (mmHg): 3 mmHg (10/29/18 1906)    Intake/Output Summary (Last 24 hours) at 11/6/2018 1605  Last data filed at 11/6/2018 1252  Gross per 24 hour   Intake 1148 ml   Output 851 ml   Net 297 ml     Patient stated is eating better today. Father stated she ate the whole breakfast tray today. Patient denies SOB or any other complaints. General:    Well nourished. Alert. No distress. Eyes:   Normal sclera. Extraocular movements intact. ENT:  Normocephalic, atraumatic. CV:   Regular rate and rhythm. No murmur, rub, or gallop. Lungs:  Clear to auscultation bilaterally. Abdomen: Soft, nontender, nondistended. Bowel sounds normal.   Extremities: Warm and dry. No cyanosis or edema. Neurologic: CN II-XII grossly intact. Sensation intact. No gross focal deficits  Skin:     No rashes or jaundice. No wounds. Psych:  Normal mood and affect. Discharge Info:   Current Discharge Medication List      START taking these medications    Details   bisacodyl (DULCOLAX) 5 mg EC tablet Take 1 Tab by mouth daily as needed for Constipation. Qty: 30 Tab, Refills: 0      mirtazapine (REMERON) 15 mg tablet Take 1 Tab by mouth nightly. Qty: 30 Tab, Refills: 0         CONTINUE these medications which have CHANGED    Details   pantoprazole (PROTONIX) 40 mg tablet Take 1 Tab by mouth daily. Qty: 30 Tab, Refills: 1    Associated Diagnoses: Insomnia, unspecified type; Generalized anxiety disorder; B12 deficiency; Irritable bowel syndrome with constipation      sucralfate (CARAFATE) 100 mg/mL suspension Take 5 mL by mouth four (4) times daily. Qty: 414 mL, Refills: 1         CONTINUE these medications which have NOT CHANGED    Details   cyanocobalamin (VITAMIN B12) 1,000 mcg/mL injection Tippo/syringe-50#Dx b12 deficiency,,,DOSE 1 CC EVERY 2 WEEKS  Qty: 10 mL, Refills: 11    Associated Diagnoses: B12 deficiency; Insomnia, unspecified type; Generalized anxiety disorder;  Abnormal thyroid blood test; Irritable bowel syndrome with constipation      promethazine (PHENERGAN) 25 mg tablet Take 1 Tab by mouth every six (6) hours as needed for Nausea. Qty: 16 Tab, Refills: 0      QUEtiapine (SEROQUEL) 100 mg tablet Take 2 Tabs by mouth nightly. Qty: 60 Tab, Refills: 11    Associated Diagnoses: Insomnia, unspecified type; Generalized anxiety disorder; B12 deficiency; Irritable bowel syndrome with constipation      levothyroxine (SYNTHROID) 25 mcg tablet 1 a day empty stomach  Indications: hypothyroidism  Qty: 30 Tab, Refills: 5    Associated Diagnoses: Acquired hypothyroidism      Cholecalciferol, Vitamin D3, (VITAMIN D3) 2,000 unit cap capsule Take 2,000 Units by mouth two (2) times a day. Indications: VITAMIN D DEFICIENCY  Qty: 60 Cap, Refills: 11    Associated Diagnoses: Vitamin D deficiency               Disposition: home  Activity: PT/OT per Home Health  Diet: Regular Diet and Clear liquids    Follow-up Information     Follow up With Specialties Details Why Contact Info    Oni Care, DO Internal Medicine   [address]  [facility]  [place] [address] [facility] [address]  [phone]                Signed:   Ryne Pulido MD

## 2018-11-06 NOTE — PROGRESS NOTES
Pt discharged home via private car. She is alert and oriented. She verbalized understanding of all discharge instructions and follow up appointments. She is leaving with all her belongings. She is stable.

## 2018-11-06 NOTE — PROGRESS NOTES
Pt is room alert and oriented. rr are even and unlabored she continues to be on room air. Tolerates well. Will continue to monitor.

## 2018-11-06 NOTE — PROGRESS NOTES
Pt is in room alert and oriented. rr are even and unlabored. Lung sounds are clear no distress noted. She is on room air tolerates well. No cough noted this AM. No SOB. abd is soft bowel sounds are active. She was able to eat most of her breakfast this AM. She continues to refuse to ambulate and wants to stay in bed. She has been educated on the importance of moving to her recovery. Family at bedside. She is stable. Will continue to monitor.

## 2018-11-06 NOTE — ANESTHESIA POSTPROCEDURE EVALUATION
Procedure(s): ESOPHAGOGASTRODUODENOSCOPY (EGD). Anesthesia Post Evaluation Multimodal analgesia: multimodal analgesia used between 6 hours prior to anesthesia start to PACU discharge Patient location during evaluation: PACU Patient participation: complete - patient participated Level of consciousness: awake and alert Pain management: adequate Airway patency: patent Anesthetic complications: no 
Cardiovascular status: acceptable and hemodynamically stable Respiratory status: acceptable Hydration status: acceptable Visit Vitals BP 95/60 Pulse 87 Temp 36.7 °C (98.1 °F) Resp 19 SpO2 95%

## 2019-05-04 LAB
DIFFERENTIAL METHOD BLD: ABNORMAL
ERYTHROCYTE [DISTWIDTH] IN BLOOD BY AUTOMATED COUNT: 13.6 %
HCT VFR BLD AUTO: 28.8 % (ref 35.8–46.3)
HGB BLD-MCNC: 9.7 G/DL (ref 11.7–15.4)
LYMPHOCYTES # BLD: 1.6 K/UL (ref 0.5–4.6)
LYMPHOCYTES NFR BLD MANUAL: 11 % (ref 16–44)
MCH RBC QN AUTO: 30.1 PG (ref 26.1–32.9)
MCHC RBC AUTO-ENTMCNC: 33.7 G/DL (ref 31.4–35)
MCV RBC AUTO: 89.4 FL (ref 79.6–97.8)
METAMYELOCYTES NFR BLD MANUAL: 2 %
MONOCYTES # BLD: 0.3 K/UL (ref 0.1–1.3)
MONOCYTES NFR BLD MANUAL: 2 % (ref 3–9)
MYELOCYTES NFR BLD MANUAL: 4 %
NEUTS SEG # BLD: 12.2 K/UL (ref 1.7–8.2)
NEUTS SEG NFR BLD MANUAL: 81 % (ref 47–75)
NRBC # BLD: 0 K/UL (ref 0–0.2)
PLATELET # BLD AUTO: 428 K/UL (ref 150–450)
PLATELET COMMENTS,PCOM: ABNORMAL
PMV BLD AUTO: 9.6 FL (ref 9.4–12.3)
RBC # BLD AUTO: 3.22 M/UL (ref 4.05–5.2)
RBC MORPH BLD: ABNORMAL
WBC # BLD AUTO: 14.1 K/UL (ref 4.3–11.1)
WBC MORPH BLD: ABNORMAL

## 2021-04-11 NOTE — PROGRESS NOTES
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 The lack of detection of the mecA gene indicates the organism is likely MSSA. Beta lactams (nafcillin or cefazolin) are superior to vancomycin for treatment of MSSA (Clin Infect Dis. 2013 Dec;57(12):1760-5; BMC Infect Dis. 2011 Oct 19;11:279). IDSA guidelines recommend removal of HD catheters in cases of S. aureus CRBSI due to the risk for morbidity associated with S. aureus bacteremia and propensity for this organism to cause biofilm resulting in difficulty with clearance. If removal is not possible, would at a minimum consider exchange over a guidewire. While the line remains in place would recommend antibiotic lock therapy if possible. Total duration of therapy will be determined pending duration of fever, time to bloodstream clearance, decisions regarding HD line and evaluation for evidence of complications.    Recommendations:  1. Add IV cefazolin. Discontinue IV vancomycin when methicillin susceptibility is confirmed.  2. Daily blood cultures until definitive blood stream clearance.  3. Echocardiogram to evaluate for vegetations.  ID will continue to follow along. Please page us with questions. Dr. Bogdan Rodriguez will take over the ID service tomorrow.    I spent 110 minutes bedside and on the inpatient unit today providing consultative care for this patient, >50% spent in counseling/coordination of care, and formulation of the treatment plan, including discussion with Vicente's mother and the primary team resident.    Betty Melvin MD, MS  Pediatric Infectious Diseases Attending  Pager: [phone]    Reason for Consult   Reason for  Report given to oncoming RN. 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 Yesterday blood cultures drawn from dialysis catheter and peripheral draw showed growth of GPC in clusters identified by the [place] as Staphylococcus aureus without mec A gene detection. Vicente had continued fevers throughout yesterday. His mother thinks besides the fever he seems to be at his baseline health status. He seems playful to her today. He does not complain of bone or joint pain. He has not had any respiratory symptoms, GI symptoms, or rash. His HD catheter was placed July 2020 and he receives HD 4 days per week. He has not had any prior infections associated with the HD line. No concerns for erythema or drainage at the exit site. He doesn't have any recent wounds.     Past Medical History    I have reviewed this patient's medical history and updated it with pertinent information if needed.   Past Medical History:   Diagnosis Date     Acute on chronic renal failure (H) 07/16/2020    Started on HD on 7/20/2020     Autism      Nephrotic syndrome        Past Surgical History   I have reviewed this patient's surgical history and updated it with pertinent information if needed.  Past Surgical History:   Procedure Laterality Date     HC BIOPSY RENAL, PERCUTANEOUS  5/24/2019          INSERT CATHETER HEMODIALYSIS CHILD Right 8/27/2020    Procedure: Check Placement and re-suture Right Hemodylisis catheter;  Surgeon: Joi Aguilar PA-C;  Location: [facility] OR     INSERT CATHETER VASCULAR ACCESS N/A 7/20/2020    Procedure: hemodialysis cath placement;  Surgeon: Carter Ni PA-C;  Location: [facility] PEDS SEDATION      IR CVC TUNNEL CHECK RIGHT  8/27/2020      IR CVC TUNNEL PLACEMENT > 5 YRS OF AGE  7/20/2020     IR RENAL BIOPSY LEFT  5/15/2020     NEPHRECTOMY BILATERAL CHILD Bilateral 9/16/2020    Procedure: NEPHRECTOMY, BILATERAL, PEDIATRIC;  Surgeon: Christopher Rao MD;  Location: [facility] OR     PERCUTANEOUS BIOPSY KIDNEY Left 5/24/2019    Procedure: Percutaneous Kidney Biopsy;  Surgeon: Jennifer Antonio MD;  Location: [facility] OR     PERCUTANEOUS BIOPSY KIDNEY Left 5/15/2020    Procedure: BIOPSY, KIDNEY Left;  Surgeon: Chary Contreras MD;  Location: [facility] OR     Allergies   No Known Allergies    Social History   Lives with parents and siblings. Does not attend school in person. Only lifetime travel is to [place]. Parents were born in [facility].    Family History   I have reviewed this patient's family history and updated it with pertinent information if needed.   Family History   Problem Relation Age of Onset     Diabetes Type 2  Maternal Grandmother      Hypertension Maternal Grandmother        Review of Systems   Negative except as per HPI    Physical Exam   Temp: 97.5  F (36.4  C) Temp src: Axillary BP: 94/57 Pulse: 103   Resp: 22 SpO2: 96 % O2 Device: None (Room air)    Vital Signs with Ranges  Temp:  [97.5  F (36.4  C)-102.3  F (39.1  C)] 97.5  F (36.4  C)  Pulse:  [100-144] 103  Resp:  [20-24] 22  BP: ()/(52-57) 94/57  SpO2:  [96 %-100 %] 96 %  40 lbs 9.03 oz    General: Awake and alert, appears comfortable, non-toxic  HEENT: clear conjunctiva, MMM, no oral lesions, OP without erythema or exudate  Chest: HD catheter in right upper chest covered with intact dressing, no surrounding erythema or drainage  CV: RRR, nl S1/S2, +systolic murmur  Lungs: clear bilaterally, no crackles or wheeze  Abdomen: soft, non tender, no mass or HSM, +BS  Extremities: warm and well perfused, no arthritis  Skin: no rash, no digital lesions, well healed abdominal scar      Data   Results for orders placed or performed during the hospital encounter of 04/09/21 (from the past 24 hour(s))    Blood culture    Specimen: Central Line; Blood    Red port   Result Value Ref Range    Specimen Description Blood Red port     Special Requests Received in aerobic bottle only     Culture Micro No growth after 2 hours    Blood culture    Specimen: Central Line; Blood    Blue port   Result Value Ref Range    Specimen Description Blood Blue port     Special Requests Received in aerobic bottle only     Culture Micro No growth after 2 hours

## 2021-09-26 NOTE — INTERVAL H&P NOTE
H&P Update: 
Maykel Yusufbaldoashely was seen and examined. Now on 1l NC O2, appropriate for EGD Signed By: Ruben Kaufman MD   
 November 5, 2018 11:12 AM   
 

H&P Update: 
Valeri Medrano was seen and examined. History and physical has been reviewed. The patient has been examined.  There have been no significant clinical changes since the completion of the originally dated History and Physical. 
 
Signed By: Shawn Horvath MD   
 November 2, 2018 4:27 PM   
 

26-Sep-2021 12:20

## 2022-03-18 PROBLEM — R00.0 TACHYCARDIA: Status: ACTIVE | Noted: 2018-10-29

## 2022-03-18 PROBLEM — R09.02 HYPOXIA: Status: ACTIVE | Noted: 2018-10-29

## 2022-03-18 PROBLEM — R04.2 HEMOPTYSIS: Status: ACTIVE | Noted: 2018-10-29

## 2022-03-19 PROBLEM — E44.0 PROTEIN-CALORIE MALNUTRITION, MODERATE (HCC): Status: ACTIVE | Noted: 2018-10-31

## 2022-03-19 PROBLEM — F33.9 RECURRENT DEPRESSION (HCC): Status: ACTIVE | Noted: 2018-01-02

## 2022-03-19 PROBLEM — E87.6 HYPOKALEMIA: Status: ACTIVE | Noted: 2018-10-29

## 2022-03-20 PROBLEM — J18.9 CAP (COMMUNITY ACQUIRED PNEUMONIA): Status: ACTIVE | Noted: 2018-10-29

## 2022-05-27 ENCOUNTER — TELEPHONE (OUTPATIENT)
Dept: INTERNAL MEDICINE CLINIC | Facility: CLINIC | Age: 42
End: 2022-05-27

## 2022-05-27 NOTE — TELEPHONE ENCOUNTER
PATIENT MAY HAVE UTI WE HAVE NO OPENINGS SO TOLD HER SHE WOULD HAVE TO GO TO URGENT CARE WE HAVE NOTHING TODAY AND WILL NOT BE HERE ON MONDAY

## 2022-06-16 ENCOUNTER — HOSPITAL ENCOUNTER (OUTPATIENT)
Dept: MAMMOGRAPHY | Age: 42
Discharge: HOME | End: 2022-06-19
Payer: COMMERCIAL

## 2022-06-16 DIAGNOSIS — Z12.31 ENCOUNTER FOR SCREENING MAMMOGRAM FOR MALIGNANT NEOPLASM OF BREAST: ICD-10-CM

## 2022-06-16 PROCEDURE — 77067 SCR MAMMO BI INCL CAD: CPT

## 2022-06-21 ENCOUNTER — HOSPITAL ENCOUNTER (OUTPATIENT)
Dept: MAMMOGRAPHY | Age: 42
Discharge: HOME | End: 2022-06-24
Payer: COMMERCIAL

## 2022-06-21 ENCOUNTER — APPOINTMENT (OUTPATIENT)
Dept: MAMMOGRAPHY | Age: 42
End: 2022-06-21
Payer: COMMERCIAL

## 2022-06-21 DIAGNOSIS — R92.8 ABNORMAL SCREENING MAMMOGRAM: ICD-10-CM

## 2022-06-21 PROCEDURE — G0279 TOMOSYNTHESIS, MAMMO: HCPCS

## 2022-06-21 PROCEDURE — 76642 ULTRASOUND BREAST LIMITED: CPT

## 2022-08-08 RX ORDER — QUETIAPINE FUMARATE 300 MG/1
300 TABLET, FILM COATED ORAL EVERY EVENING
Qty: 30 TABLET | Refills: 1 | Status: SHIPPED | OUTPATIENT
Start: 2022-08-08 | End: 2022-09-06 | Stop reason: SDUPTHER

## 2022-09-06 ENCOUNTER — OFFICE VISIT (OUTPATIENT)
Dept: INTERNAL MEDICINE CLINIC | Facility: CLINIC | Age: 42
End: 2022-09-06
Payer: COMMERCIAL

## 2022-09-06 VITALS
HEIGHT: 62 IN | OXYGEN SATURATION: 98 % | HEART RATE: 90 BPM | DIASTOLIC BLOOD PRESSURE: 66 MMHG | RESPIRATION RATE: 16 BRPM | SYSTOLIC BLOOD PRESSURE: 102 MMHG | WEIGHT: 117 LBS | BODY MASS INDEX: 21.53 KG/M2

## 2022-09-06 DIAGNOSIS — F41.1 GENERALIZED ANXIETY DISORDER: ICD-10-CM

## 2022-09-06 DIAGNOSIS — E03.9 ACQUIRED HYPOTHYROIDISM: Primary | ICD-10-CM

## 2022-09-06 DIAGNOSIS — F50.00 ANOREXIA NERVOSA, UNSPECIFIED: ICD-10-CM

## 2022-09-06 DIAGNOSIS — R35.0 URINATION FREQUENCY: ICD-10-CM

## 2022-09-06 LAB
BILIRUBIN, URINE, POC: NEGATIVE
BLOOD URINE, POC: 1
GLUCOSE URINE, POC: NEGATIVE
HCG, PREGNANCY, URINE, POC: NEGATIVE
KETONES, URINE, POC: NEGATIVE
LEUKOCYTE ESTERASE, URINE, POC: NEGATIVE
NITRITE, URINE, POC: NEGATIVE
PH, URINE, POC: 6 (ref 4.6–8)
PROTEIN,URINE, POC: NEGATIVE
SPECIFIC GRAVITY, URINE, POC: 1.03 (ref 1–1.03)
URINALYSIS CLARITY, POC: CLEAR
URINALYSIS COLOR, POC: YELLOW
UROBILINOGEN, POC: NORMAL
VALID INTERNAL CONTROL, POC: YES

## 2022-09-06 PROCEDURE — 81025 URINE PREGNANCY TEST: CPT | Performed by: NURSE PRACTITIONER

## 2022-09-06 PROCEDURE — 81003 URINALYSIS AUTO W/O SCOPE: CPT | Performed by: NURSE PRACTITIONER

## 2022-09-06 PROCEDURE — 99214 OFFICE O/P EST MOD 30 MIN: CPT | Performed by: NURSE PRACTITIONER

## 2022-09-06 RX ORDER — QUETIAPINE FUMARATE 300 MG/1
300 TABLET, FILM COATED ORAL EVERY EVENING
Qty: 30 TABLET | Refills: 1 | Status: SHIPPED | OUTPATIENT
Start: 2022-09-06

## 2022-09-06 ASSESSMENT — PATIENT HEALTH QUESTIONNAIRE - PHQ9
7. TROUBLE CONCENTRATING ON THINGS, SUCH AS READING THE NEWSPAPER OR WATCHING TELEVISION: 0
6. FEELING BAD ABOUT YOURSELF - OR THAT YOU ARE A FAILURE OR HAVE LET YOURSELF OR YOUR FAMILY DOWN: 0
5. POOR APPETITE OR OVEREATING: 0
4. FEELING TIRED OR HAVING LITTLE ENERGY: 0
SUM OF ALL RESPONSES TO PHQ9 QUESTIONS 1 & 2: 0
2. FEELING DOWN, DEPRESSED OR HOPELESS: 0
9. THOUGHTS THAT YOU WOULD BE BETTER OFF DEAD, OR OF HURTING YOURSELF: 0
3. TROUBLE FALLING OR STAYING ASLEEP: 0
8. MOVING OR SPEAKING SO SLOWLY THAT OTHER PEOPLE COULD HAVE NOTICED. OR THE OPPOSITE, BEING SO FIGETY OR RESTLESS THAT YOU HAVE BEEN MOVING AROUND A LOT MORE THAN USUAL: 0
SUM OF ALL RESPONSES TO PHQ QUESTIONS 1-9: 0
1. LITTLE INTEREST OR PLEASURE IN DOING THINGS: 0
SUM OF ALL RESPONSES TO PHQ QUESTIONS 1-9: 0
10. IF YOU CHECKED OFF ANY PROBLEMS, HOW DIFFICULT HAVE THESE PROBLEMS MADE IT FOR YOU TO DO YOUR WORK, TAKE CARE OF THINGS AT HOME, OR GET ALONG WITH OTHER PEOPLE: 0

## 2022-09-06 NOTE — PROGRESS NOTES
Subjective:      Patient ID: Toyin Baptiste is a 66 Tori Hope (:  1980) is a 43 y.o. female,Established patient, here for evaluation of the following chief complaint(s):  Gynecologic Exam         ASSESSMENT/PLAN:  1. Acquired hypothyroidism  -     CBC with Auto Differential; Future  -     Comprehensive Metabolic Panel; Future  -     Hemoglobin A1C; Future  -     Lipid Panel; Future  -     TSH; Future  2. Anorexia nervosa, unspecified  -     Hemoglobin A1C; Future  -     Lipid Panel; Future  -     TSH; Future  3. Generalized anxiety disorder  4. Urination frequency  -     AMB POC URINE PREGNANCY TEST, VISUAL COLOR COMPARISON  -     AMB POC URINALYSIS DIP STICK AUTO W/O MICRO      Return in about 6 months (around 3/6/2023) for Dr Delbert Tena. Check lab  Negative UA and negative pregnancy  She has Gyn she sees and will get PAP in November  Refill seroquel  Reassured regarding cyst right axilla, no infection  Use warm compresses      Subjective   SUBJECTIVE/OBJECTIVE:  Patient is here for follow up. She has anorexia, doing well on seroquel. She has a GYN, saw them last month and has follow up in November. Plans for pap with the GYN   She has a hair bump in right axilla that bothers her, she has been using warm compresses. Review of Systems       Objective   Physical Exam  Constitutional:       Appearance: Normal appearance. She is not ill-appearing. HENT:      Head: Normocephalic. Eyes:      Extraocular Movements: Extraocular movements intact. Pupils: Pupils are equal, round, and reactive to light. Cardiovascular:      Rate and Rhythm: Normal rate and regular rhythm. Pulmonary:      Effort: Pulmonary effort is normal.      Breath sounds: Normal breath sounds. Musculoskeletal:      Cervical back: Neck supple. Skin:     Comments: Right axilla with small cyst with drainage, expressed white waxy discharge, no redness/swelling/bleeding   Neurological:      Mental Status: She is alert.    Psychiatric:         Mood and Affect: Mood normal. Behavior: Behavior normal.                An electronic signature was used to authenticate this note.     --Heather Antonio, LIYA - CNP Pupils are equal, round, 

## 2022-09-07 DIAGNOSIS — D72.819 LEUKOPENIA, UNSPECIFIED TYPE: Primary | ICD-10-CM

## 2022-09-07 LAB
ALBUMIN SERPL-MCNC: 3.5 G/DL (ref 3.5–5)
ALBUMIN/GLOB SERPL: 1 {RATIO} (ref 1.2–3.5)
ALP SERPL-CCNC: 92 U/L (ref 50–136)
ALT SERPL-CCNC: 20 U/L (ref 12–65)
ANION GAP SERPL CALC-SCNC: 3 MMOL/L (ref 4–13)
AST SERPL-CCNC: 18 U/L (ref 15–37)
BASOPHILS # BLD: 0 K/UL (ref 0–0.2)
BASOPHILS NFR BLD: 1 % (ref 0–2)
BILIRUB SERPL-MCNC: 0.3 MG/DL (ref 0.2–1.1)
BUN SERPL-MCNC: 14 MG/DL (ref 6–23)
CALCIUM SERPL-MCNC: 8.5 MG/DL (ref 8.3–10.4)
CHLORIDE SERPL-SCNC: 105 MMOL/L (ref 101–110)
CHOLEST SERPL-MCNC: 232 MG/DL
CO2 SERPL-SCNC: 27 MMOL/L (ref 21–32)
CREAT SERPL-MCNC: 0.6 MG/DL (ref 0.6–1)
DIFFERENTIAL METHOD BLD: ABNORMAL
EOSINOPHIL # BLD: 0.1 K/UL (ref 0–0.8)
EOSINOPHIL NFR BLD: 2 % (ref 0.5–7.8)
ERYTHROCYTE [DISTWIDTH] IN BLOOD BY AUTOMATED COUNT: 12.8 % (ref 11.9–14.6)
EST. AVERAGE GLUCOSE BLD GHB EST-MCNC: 94 MG/DL
GLOBULIN SER CALC-MCNC: 3.4 G/DL (ref 2.3–3.5)
GLUCOSE SERPL-MCNC: 76 MG/DL (ref 65–100)
HBA1C MFR BLD: 4.9 % (ref 4.8–5.6)
HCT VFR BLD AUTO: 39.6 % (ref 35.8–46.3)
HDLC SERPL-MCNC: 67 MG/DL (ref 40–60)
HDLC SERPL: 3.5 {RATIO}
HGB BLD-MCNC: 12.6 G/DL (ref 11.7–15.4)
IMM GRANULOCYTES # BLD AUTO: 0 K/UL (ref 0–0.5)
IMM GRANULOCYTES NFR BLD AUTO: 0 % (ref 0–5)
LDLC SERPL CALC-MCNC: 144.2 MG/DL
LYMPHOCYTES # BLD: 1.2 K/UL (ref 0.5–4.6)
LYMPHOCYTES NFR BLD: 35 % (ref 13–44)
MCH RBC QN AUTO: 29.2 PG (ref 26.1–32.9)
MCHC RBC AUTO-ENTMCNC: 31.8 G/DL (ref 31.4–35)
MCV RBC AUTO: 91.7 FL (ref 79.6–97.8)
MONOCYTES # BLD: 0.3 K/UL (ref 0.1–1.3)
MONOCYTES NFR BLD: 10 % (ref 4–12)
NEUTS SEG # BLD: 1.7 K/UL (ref 1.7–8.2)
NEUTS SEG NFR BLD: 52 % (ref 43–78)
NRBC # BLD: 0 K/UL (ref 0–0.2)
PLATELET # BLD AUTO: 296 K/UL (ref 150–450)
PMV BLD AUTO: 9.6 FL (ref 9.4–12.3)
POTASSIUM SERPL-SCNC: 4 MMOL/L (ref 3.5–5.1)
PROT SERPL-MCNC: 6.9 G/DL (ref 6.3–8.2)
RBC # BLD AUTO: 4.32 M/UL (ref 4.05–5.2)
SODIUM SERPL-SCNC: 135 MMOL/L (ref 136–145)
TRIGL SERPL-MCNC: 104 MG/DL (ref 35–150)
TSH, 3RD GENERATION: 2.14 UIU/ML (ref 0.36–3.74)
VLDLC SERPL CALC-MCNC: 20.8 MG/DL (ref 6–23)
WBC # BLD AUTO: 3.3 K/UL (ref 4.3–11.1)

## 2023-01-03 RX ORDER — QUETIAPINE FUMARATE 300 MG/1
TABLET, FILM COATED ORAL
Qty: 30 TABLET | Refills: 2 | Status: SHIPPED | OUTPATIENT
Start: 2023-01-03

## 2023-03-30 RX ORDER — QUETIAPINE FUMARATE 300 MG/1
TABLET, FILM COATED ORAL
Qty: 30 TABLET | Refills: 2 | OUTPATIENT
Start: 2023-03-30

## 2023-04-05 ENCOUNTER — TELEPHONE (OUTPATIENT)
Dept: INTERNAL MEDICINE CLINIC | Facility: CLINIC | Age: 43
End: 2023-04-05

## 2023-04-05 RX ORDER — QUETIAPINE FUMARATE 300 MG/1
300 TABLET, FILM COATED ORAL EVERY EVENING
Qty: 30 TABLET | Refills: 5 | Status: SHIPPED | OUTPATIENT
Start: 2023-04-05

## 2023-07-27 ENCOUNTER — TRANSCRIBE ORDERS (OUTPATIENT)
Dept: SCHEDULING | Age: 43
End: 2023-07-27

## 2023-07-27 DIAGNOSIS — Z12.31 SCREENING MAMMOGRAM FOR HIGH-RISK PATIENT: Primary | ICD-10-CM

## 2023-08-02 ENCOUNTER — OFFICE VISIT (OUTPATIENT)
Dept: INTERNAL MEDICINE CLINIC | Facility: CLINIC | Age: 43
End: 2023-08-02
Payer: MEDICAID

## 2023-08-02 ENCOUNTER — TELEPHONE (OUTPATIENT)
Dept: INTERNAL MEDICINE CLINIC | Facility: CLINIC | Age: 43
End: 2023-08-02

## 2023-08-02 VITALS
DIASTOLIC BLOOD PRESSURE: 78 MMHG | TEMPERATURE: 97.7 F | WEIGHT: 94.8 LBS | HEIGHT: 64 IN | BODY MASS INDEX: 16.18 KG/M2 | SYSTOLIC BLOOD PRESSURE: 102 MMHG | OXYGEN SATURATION: 98 % | HEART RATE: 98 BPM

## 2023-08-02 DIAGNOSIS — E44.0 PROTEIN-CALORIE MALNUTRITION, MODERATE (HCC): ICD-10-CM

## 2023-08-02 DIAGNOSIS — R63.6 UNDERWEIGHT: ICD-10-CM

## 2023-08-02 DIAGNOSIS — R63.0 ANOREXIA: ICD-10-CM

## 2023-08-02 DIAGNOSIS — F33.9 RECURRENT DEPRESSION (HCC): ICD-10-CM

## 2023-08-02 DIAGNOSIS — H61.92 SKIN LESION OF LEFT EAR: Primary | ICD-10-CM

## 2023-08-02 PROCEDURE — 99214 OFFICE O/P EST MOD 30 MIN: CPT | Performed by: NURSE PRACTITIONER

## 2023-08-02 SDOH — ECONOMIC STABILITY: INCOME INSECURITY: HOW HARD IS IT FOR YOU TO PAY FOR THE VERY BASICS LIKE FOOD, HOUSING, MEDICAL CARE, AND HEATING?: NOT HARD AT ALL

## 2023-08-02 SDOH — ECONOMIC STABILITY: HOUSING INSECURITY
IN THE LAST 12 MONTHS, WAS THERE A TIME WHEN YOU DID NOT HAVE A STEADY PLACE TO SLEEP OR SLEPT IN A SHELTER (INCLUDING NOW)?: NO

## 2023-08-02 SDOH — ECONOMIC STABILITY: FOOD INSECURITY: WITHIN THE PAST 12 MONTHS, THE FOOD YOU BOUGHT JUST DIDN'T LAST AND YOU DIDN'T HAVE MONEY TO GET MORE.: NEVER TRUE

## 2023-08-02 SDOH — ECONOMIC STABILITY: FOOD INSECURITY: WITHIN THE PAST 12 MONTHS, YOU WORRIED THAT YOUR FOOD WOULD RUN OUT BEFORE YOU GOT MONEY TO BUY MORE.: NEVER TRUE

## 2023-08-02 ASSESSMENT — PATIENT HEALTH QUESTIONNAIRE - PHQ9
1. LITTLE INTEREST OR PLEASURE IN DOING THINGS: 0
SUM OF ALL RESPONSES TO PHQ QUESTIONS 1-9: 0
4. FEELING TIRED OR HAVING LITTLE ENERGY: 0
7. TROUBLE CONCENTRATING ON THINGS, SUCH AS READING THE NEWSPAPER OR WATCHING TELEVISION: 0
8. MOVING OR SPEAKING SO SLOWLY THAT OTHER PEOPLE COULD HAVE NOTICED. OR THE OPPOSITE, BEING SO FIGETY OR RESTLESS THAT YOU HAVE BEEN MOVING AROUND A LOT MORE THAN USUAL: 0
SUM OF ALL RESPONSES TO PHQ QUESTIONS 1-9: 0
SUM OF ALL RESPONSES TO PHQ QUESTIONS 1-9: 0
2. FEELING DOWN, DEPRESSED OR HOPELESS: 0
6. FEELING BAD ABOUT YOURSELF - OR THAT YOU ARE A FAILURE OR HAVE LET YOURSELF OR YOUR FAMILY DOWN: 0
3. TROUBLE FALLING OR STAYING ASLEEP: 0
SUM OF ALL RESPONSES TO PHQ QUESTIONS 1-9: 0
5. POOR APPETITE OR OVEREATING: 0
9. THOUGHTS THAT YOU WOULD BE BETTER OFF DEAD, OR OF HURTING YOURSELF: 0
SUM OF ALL RESPONSES TO PHQ9 QUESTIONS 1 & 2: 0

## 2023-08-02 ASSESSMENT — ENCOUNTER SYMPTOMS: SHORTNESS OF BREATH: 0

## 2023-08-02 NOTE — ASSESSMENT & PLAN NOTE
Wt Readings from Last 3 Encounters:   08/02/23 94 lb 12.8 oz (43 kg)   09/06/22 117 lb (53.1 kg)   03/03/22 123 lb (55.8 kg)   Losing weight, she isn't eating 3 meals a day, eats a little bit once a day

## 2023-08-02 NOTE — PROGRESS NOTES
Gerhardt Slimmer (:  1980) is a 37 y.o. female,Established patient, here for evaluation of the following chief complaint(s):  Skin Problem (Left outside of ear tender and painful) and Dizziness         ASSESSMENT/PLAN:  1. Skin lesion of left ear  -     [facility]  2. Protein-calorie malnutrition, moderate (HCC)  Assessment & Plan:     Wt Readings from Last 3 Encounters:   23 94 lb 12.8 oz (43 kg)   22 117 lb (53.1 kg)   22 123 lb (55.8 kg)   Losing weight, she isn't eating 3 meals a day, eats a little bit once a day  Orders:  -     Hemoglobin A1C; Future  -     Lipid Panel; Future  -     TSH  -     Comprehensive Metabolic Panel; Future  -     CBC; Future  3. Recurrent depression ([address])  Assessment & Plan:   Isn't seeing psych, taking seroquel and states mood is good w/o depression    Orders:  -     AFL - [facility] [place] ([address])  4. Underweight  -     Hemoglobin A1C; Future  -     Lipid Panel; Future  -     TSH  -     Comprehensive Metabolic Panel; Future  -     CBC; Future  5. Anorexia  -     [facility] [place] ([address])  -     Hemoglobin A1C; Future  -     Lipid Panel; Future  -     TSH  -     Comprehensive Metabolic Panel; Future  -     CBC; Future      No follow-ups on file. Refer to derm for flaky skin on left ear  Losing weight, underweight, not seeing psych  Depression controlled with seroquel  Increase protein and calories  Check routine lab  F/u with PCP      Subjective   SUBJECTIVE/OBJECTIVE:  Patient is here for a sensitive spot on her left ear. It is just one area on the ear that is sensitive. It used to be sensitive over the entire ear but improved. It started 3 months. She has tried a&d ointment and it burns. She hasn't tried anything else to the ear   No other complaints    Skin Problem  This is a new problem. The current episode started more than 1 month ago.  The problem has been gradually improving

## 2023-08-03 ENCOUNTER — TELEPHONE (OUTPATIENT)
Dept: INTERNAL MEDICINE CLINIC | Facility: CLINIC | Age: 43
End: 2023-08-03

## 2023-08-03 DIAGNOSIS — H61.92 SKIN LESION OF LEFT EAR: Primary | ICD-10-CM

## 2023-08-03 NOTE — TELEPHONE ENCOUNTER
Susan Meléndez called the referral person had to send there because of [address] needs for us to resend the referral with there name on it can you put in new referral to Susan Meléndez thank you

## 2023-08-04 ENCOUNTER — NURSE ONLY (OUTPATIENT)
Dept: INTERNAL MEDICINE CLINIC | Facility: CLINIC | Age: 43
End: 2023-08-04

## 2023-08-04 DIAGNOSIS — R63.6 UNDERWEIGHT: ICD-10-CM

## 2023-08-04 DIAGNOSIS — R63.0 ANOREXIA: ICD-10-CM

## 2023-08-04 DIAGNOSIS — E44.0 PROTEIN-CALORIE MALNUTRITION, MODERATE (HCC): ICD-10-CM

## 2023-08-04 LAB
ALBUMIN SERPL-MCNC: 3.5 G/DL (ref 3.5–5)
ALBUMIN/GLOB SERPL: 1.1 (ref 0.4–1.6)
ALP SERPL-CCNC: 72 U/L (ref 50–136)
ALT SERPL-CCNC: 17 U/L (ref 12–65)
ANION GAP SERPL CALC-SCNC: 8 MMOL/L (ref 2–11)
AST SERPL-CCNC: 20 U/L (ref 15–37)
BILIRUB SERPL-MCNC: 0.5 MG/DL (ref 0.2–1.1)
BUN SERPL-MCNC: 16 MG/DL (ref 6–23)
CALCIUM SERPL-MCNC: 8.7 MG/DL (ref 8.3–10.4)
CHLORIDE SERPL-SCNC: 104 MMOL/L (ref 101–110)
CHOLEST SERPL-MCNC: 211 MG/DL
CO2 SERPL-SCNC: 28 MMOL/L (ref 21–32)
CREAT SERPL-MCNC: 0.6 MG/DL (ref 0.6–1)
ERYTHROCYTE [DISTWIDTH] IN BLOOD BY AUTOMATED COUNT: 13.1 % (ref 11.9–14.6)
EST. AVERAGE GLUCOSE BLD GHB EST-MCNC: 88 MG/DL
GLOBULIN SER CALC-MCNC: 3.1 G/DL (ref 2.8–4.5)
GLUCOSE SERPL-MCNC: 76 MG/DL (ref 65–100)
HBA1C MFR BLD: 4.7 % (ref 4.8–5.6)
HCT VFR BLD AUTO: 41.8 % (ref 35.8–46.3)
HDLC SERPL-MCNC: 87 MG/DL (ref 40–60)
HDLC SERPL: 2.4
HGB BLD-MCNC: 13.1 G/DL (ref 11.7–15.4)
LDLC SERPL CALC-MCNC: 107 MG/DL
MCH RBC QN AUTO: 29.7 PG (ref 26.1–32.9)
MCHC RBC AUTO-ENTMCNC: 31.3 G/DL (ref 31.4–35)
MCV RBC AUTO: 94.8 FL (ref 82–102)
NRBC # BLD: 0 K/UL (ref 0–0.2)
PLATELET # BLD AUTO: 323 K/UL (ref 150–450)
PMV BLD AUTO: 9.7 FL (ref 9.4–12.3)
POTASSIUM SERPL-SCNC: 4.2 MMOL/L (ref 3.5–5.1)
PROT SERPL-MCNC: 6.6 G/DL (ref 6.3–8.2)
RBC # BLD AUTO: 4.41 M/UL (ref 4.05–5.2)
SODIUM SERPL-SCNC: 140 MMOL/L (ref 133–143)
TRIGL SERPL-MCNC: 85 MG/DL (ref 35–150)
TSH, 3RD GENERATION: 2.28 UIU/ML (ref 0.36–3.74)
VLDLC SERPL CALC-MCNC: 17 MG/DL (ref 6–23)
WBC # BLD AUTO: 3.8 K/UL (ref 4.3–11.1)

## 2023-08-31 ENCOUNTER — HOSPITAL ENCOUNTER (OUTPATIENT)
Dept: MAMMOGRAPHY | Age: 43
Discharge: HOME | End: 2023-08-31
Attending: INTERNAL MEDICINE
Payer: MEDICAID

## 2023-08-31 DIAGNOSIS — Z12.31 SCREENING MAMMOGRAM FOR HIGH-RISK PATIENT: ICD-10-CM

## 2023-08-31 PROCEDURE — 77063 BREAST TOMOSYNTHESIS BI: CPT

## 2023-09-01 RX ORDER — QUETIAPINE FUMARATE 300 MG/1
300 TABLET, FILM COATED ORAL EVERY EVENING
Qty: 30 TABLET | Refills: 5 | Status: SHIPPED | OUTPATIENT
Start: 2023-09-01

## 2023-09-11 ENCOUNTER — HOSPITAL ENCOUNTER (OUTPATIENT)
Dept: MAMMOGRAPHY | Age: 43
Discharge: HOME | End: 2023-09-14
Attending: INTERNAL MEDICINE
Payer: MEDICAID

## 2023-09-11 DIAGNOSIS — R92.8 ABNORMAL SCREENING MAMMOGRAM: ICD-10-CM

## 2023-09-11 PROCEDURE — 77065 DX MAMMO INCL CAD UNI: CPT

## 2024-03-07 ENCOUNTER — TELEPHONE (OUTPATIENT)
Dept: INTERNAL MEDICINE CLINIC | Facility: CLINIC | Age: 44
End: 2024-03-07

## 2024-03-07 DIAGNOSIS — L60.0 INGROWN NAIL: Primary | ICD-10-CM

## 2024-03-07 NOTE — TELEPHONE ENCOUNTER
Patients mother Dimple called and states that the patient has an ingrown to nail and would like to know if she can get a referral to see a podiatrist. Please Advise.

## 2024-04-05 RX ORDER — QUETIAPINE FUMARATE 300 MG/1
300 TABLET, FILM COATED ORAL EVERY EVENING
Qty: 30 TABLET | Refills: 5 | Status: SHIPPED | OUTPATIENT
Start: 2024-04-05

## 2024-04-05 NOTE — TELEPHONE ENCOUNTER
Patients dad called requesting a refill on the patients QUEtiapine (SEROQUEL) 300 MG tablet. Please Advise.         [facility] [place]

## 2024-09-20 NOTE — PROGRESS NOTES
BSR received from [facility] shift assessment completed pt alert and oriented in bed resting call light within reach no distress noted will continue to monitor Patient reached __X__ yes  _____ no   VM instructions left ____ yes   phone number ________                                ____ no-office notified          Date _10/2/24________  Time __1100_____  Arrival __0930  [facility]      Nothing to eat or drink after midnight-follow your doctors prep instructions-this may include taking a second dose of your prep after midnight  Responsible adult 18 or older to stay on site while you are here-drive you home-stay with you after  Follow any instructions your doctors office has given you  Bring a complete list of all your medications and supplements including name,dose,how often taken the day of your procedure  If you normally take the following medications in the morning please do so the AM of your procedure with a small sip of water       Heart,blood pressure,seizure,thyroid or breathing medications-use your inhalers-bring any rescue inhalers with you DOS       DO NOT take blood pressure medications ending in \"donavon\" or \"pril\" the AM of procedure or evening prior-DO NOT TAKE LISINOPRIL  Dr Balderrama patients are not to take any medications the AM of surgery  Take half or your normal dose of any long acting insulins the night before your procedure-do not take any diabetic medications the AM of procedure. If you take a weekly injection for diabetes or weight loss-do not take one week prior to surgery/procedure.If you have already taken your injection this week,contact your surgeon  Follow your doctors instructions regarding stopping or taking  any blood thinners-if you do not have instructions-call them  Any questions call your doctor  Other ___________take metoprolol am of procedure___________________________________________________      VISITOR POLICY(subject to change)             The current policy is 2 visitors per patient.There are no children allowed.Mask at discretion of facility. Visiting hours are 8a-8p.Overnight visitors will be at the discretion of the nurse. All

## 2024-10-07 ENCOUNTER — TELEPHONE (OUTPATIENT)
Dept: INTERNAL MEDICINE CLINIC | Facility: CLINIC | Age: 44
End: 2024-10-07

## 2024-10-07 RX ORDER — QUETIAPINE FUMARATE 300 MG/1
300 TABLET, FILM COATED ORAL EVERY EVENING
Qty: 30 TABLET | Refills: 0 | Status: SHIPPED | OUTPATIENT
Start: 2024-10-07 | End: 2024-10-10 | Stop reason: SDUPTHER

## 2024-10-07 NOTE — TELEPHONE ENCOUNTER
Patient called requesting a refill on her QUEtiapine (SEROQUEL) 300 MG tablet. Patient is scheduled to come in on Wednesday for an appointment. Please Advise.       [facility] [place]

## 2024-10-08 ENCOUNTER — TELEPHONE (OUTPATIENT)
Dept: INTERNAL MEDICINE CLINIC | Facility: CLINIC | Age: 44
End: 2024-10-08

## 2024-10-08 NOTE — TELEPHONE ENCOUNTER
Dimple called and states that the patient is not able to urinate, nausea and having abdominal pain. I called Dimple back to advise her that she needed to take the patient to the ER. Dimple states that she would take to the patient to the ER. Please Advise.

## 2024-10-10 ENCOUNTER — OFFICE VISIT (OUTPATIENT)
Dept: INTERNAL MEDICINE CLINIC | Facility: CLINIC | Age: 44
End: 2024-10-10
Payer: MEDICAID

## 2024-10-10 VITALS
SYSTOLIC BLOOD PRESSURE: 96 MMHG | DIASTOLIC BLOOD PRESSURE: 74 MMHG | HEART RATE: 82 BPM | WEIGHT: 92.6 LBS | BODY MASS INDEX: 17.04 KG/M2 | TEMPERATURE: 98.6 F | HEIGHT: 62 IN | OXYGEN SATURATION: 98 %

## 2024-10-10 DIAGNOSIS — R63.0 ANOREXIA: ICD-10-CM

## 2024-10-10 DIAGNOSIS — G57.02 SCIATIC MONONEUROPATHY, LEFT: Primary | ICD-10-CM

## 2024-10-10 DIAGNOSIS — F50.00 ANOREXIA NERVOSA, UNSPECIFIED: ICD-10-CM

## 2024-10-10 DIAGNOSIS — F51.04 PSYCHOPHYSIOLOGICAL INSOMNIA: ICD-10-CM

## 2024-10-10 DIAGNOSIS — F33.9 RECURRENT DEPRESSION (HCC): ICD-10-CM

## 2024-10-10 DIAGNOSIS — R63.6 UNDERWEIGHT: ICD-10-CM

## 2024-10-10 DIAGNOSIS — Z23 FLU VACCINE NEED: ICD-10-CM

## 2024-10-10 DIAGNOSIS — E53.8 B12 DEFICIENCY: ICD-10-CM

## 2024-10-10 PROCEDURE — 90661 CCIIV3 VAC ABX FR 0.5 ML IM: CPT | Performed by: INTERNAL MEDICINE

## 2024-10-10 PROCEDURE — 90471 IMMUNIZATION ADMIN: CPT | Performed by: INTERNAL MEDICINE

## 2024-10-10 PROCEDURE — 99214 OFFICE O/P EST MOD 30 MIN: CPT | Performed by: INTERNAL MEDICINE

## 2024-10-10 RX ORDER — ONDANSETRON 8 MG/1
8 TABLET, ORALLY DISINTEGRATING ORAL EVERY 8 HOURS PRN
Qty: 30 TABLET | Refills: 0 | Status: SHIPPED | OUTPATIENT
Start: 2024-10-10

## 2024-10-10 RX ORDER — MIRTAZAPINE 7.5 MG/1
7.5 TABLET, FILM COATED ORAL NIGHTLY
Qty: 90 TABLET | Refills: 1 | Status: SHIPPED | OUTPATIENT
Start: 2024-10-10

## 2024-10-10 RX ORDER — QUETIAPINE FUMARATE 300 MG/1
300 TABLET, FILM COATED ORAL EVERY EVENING
Qty: 30 TABLET | Refills: 5 | Status: SHIPPED | OUTPATIENT
Start: 2024-10-10

## 2024-10-10 SDOH — ECONOMIC STABILITY: FOOD INSECURITY: WITHIN THE PAST 12 MONTHS, YOU WORRIED THAT YOUR FOOD WOULD RUN OUT BEFORE YOU GOT MONEY TO BUY MORE.: NEVER TRUE

## 2024-10-10 SDOH — ECONOMIC STABILITY: FOOD INSECURITY: WITHIN THE PAST 12 MONTHS, THE FOOD YOU BOUGHT JUST DIDN'T LAST AND YOU DIDN'T HAVE MONEY TO GET MORE.: NEVER TRUE

## 2024-10-10 SDOH — ECONOMIC STABILITY: INCOME INSECURITY: HOW HARD IS IT FOR YOU TO PAY FOR THE VERY BASICS LIKE FOOD, HOUSING, MEDICAL CARE, AND HEATING?: NOT HARD AT ALL

## 2024-10-10 ASSESSMENT — PATIENT HEALTH QUESTIONNAIRE - PHQ9
5. POOR APPETITE OR OVEREATING: NOT AT ALL
9. THOUGHTS THAT YOU WOULD BE BETTER OFF DEAD, OR OF HURTING YOURSELF: NOT AT ALL
1. LITTLE INTEREST OR PLEASURE IN DOING THINGS: SEVERAL DAYS
SUM OF ALL RESPONSES TO PHQ QUESTIONS 1-9: 4
6. FEELING BAD ABOUT YOURSELF - OR THAT YOU ARE A FAILURE OR HAVE LET YOURSELF OR YOUR FAMILY DOWN: NOT AT ALL
4. FEELING TIRED OR HAVING LITTLE ENERGY: SEVERAL DAYS
10. IF YOU CHECKED OFF ANY PROBLEMS, HOW DIFFICULT HAVE THESE PROBLEMS MADE IT FOR YOU TO DO YOUR WORK, TAKE CARE OF THINGS AT HOME, OR GET ALONG WITH OTHER PEOPLE: NOT DIFFICULT AT ALL
SUM OF ALL RESPONSES TO PHQ QUESTIONS 1-9: 4
SUM OF ALL RESPONSES TO PHQ QUESTIONS 1-9: 4
7. TROUBLE CONCENTRATING ON THINGS, SUCH AS READING THE NEWSPAPER OR WATCHING TELEVISION: MORE THAN HALF THE DAYS
SUM OF ALL RESPONSES TO PHQ QUESTIONS 1-9: 4
8. MOVING OR SPEAKING SO SLOWLY THAT OTHER PEOPLE COULD HAVE NOTICED. OR THE OPPOSITE, BEING SO FIGETY OR RESTLESS THAT YOU HAVE BEEN MOVING AROUND A LOT MORE THAN USUAL: NOT AT ALL
2. FEELING DOWN, DEPRESSED OR HOPELESS: NOT AT ALL
SUM OF ALL RESPONSES TO PHQ9 QUESTIONS 1 & 2: 1
3. TROUBLE FALLING OR STAYING ASLEEP: NOT AT ALL

## 2024-10-10 NOTE — PROGRESS NOTES
Vomiting, Disp: 30 tablet, Rfl: 0    Allergies   Allergen Reactions    Olanzapine Other (See Comments)     Upset stomach    Metronidazole Nausea And Vomiting         Review of Systems   Constitutional:  Negative for appetite change, chills, fatigue and fever.   HENT:  Negative for sinus pain.    Respiratory:  Negative for chest tightness, shortness of breath and wheezing.    Cardiovascular:  Negative for chest pain.   Gastrointestinal:  Negative for abdominal pain, constipation, diarrhea, nausea and vomiting.   Genitourinary:  Negative for dysuria and frequency.   Musculoskeletal:  Positive for arthralgias, back pain and myalgias.   Skin:  Negative for rash.   Neurological:  Negative for dizziness.   Psychiatric/Behavioral:  Negative for suicidal ideas.    All other systems reviewed and are negative.        Vitals:    10/10/24 1618   BP: 96/74   Site: Right Upper Arm   Position: Sitting   Cuff Size: Medium Adult   Pulse: 82   Temp: 98.6 °F (37 °C)   TempSrc: Temporal   SpO2: 98%   Weight: 42 kg (92 lb 9.6 oz)   Height: 1.575 m (5' 2\")     Wt Readings from Last 3 Encounters:   10/10/24 42 kg (92 lb 9.6 oz)   08/02/23 43 kg (94 lb 12.8 oz)   09/06/22 53.1 kg (117 lb)         Physical Exam  Musculoskeletal:        Legs:             Lisbet was seen today for leg pain.    Diagnoses and all orders for this visit:    Sciatic mononeuropathy, left  -     [facility]  -     [facility]    Flu vaccine need  -     Influenza, FLUCELVAX Trivalent, (age 6 mo+) IM, Preservative Free, 0.5mL    Anorexia  -     [facility]  -     DEXA BONE DENSITY AXIAL SKELETON; Future  -     mirtazapine (REMERON) 7.5 MG tablet; Take 1 tablet by mouth nightly  -     ondansetron (ZOFRAN-ODT) 8 MG TBDP disintegrating tablet; Take 1 tablet by mouth every 8 hours as needed for Nausea or Vomiting    Anorexia nervosa,

## 2024-10-16 ASSESSMENT — ENCOUNTER SYMPTOMS
DIARRHEA: 0
VOMITING: 0
CONSTIPATION: 0
SINUS PAIN: 0
WHEEZING: 0
NAUSEA: 0
BACK PAIN: 1
CHEST TIGHTNESS: 0
ABDOMINAL PAIN: 0

## 2024-10-21 ENCOUNTER — HOSPITAL ENCOUNTER (OUTPATIENT)
Dept: PHYSICAL THERAPY | Age: 44
Setting detail: RECURRING SERIES
Discharge: HOME | End: 2024-10-24
Attending: INTERNAL MEDICINE
Payer: MEDICAID

## 2024-10-21 DIAGNOSIS — M25.551 PAIN IN RIGHT HIP: Primary | ICD-10-CM

## 2024-10-21 DIAGNOSIS — R29.898 WEAKNESS OF RIGHT HIP: ICD-10-CM

## 2024-10-21 DIAGNOSIS — M62.838 OTHER MUSCLE SPASM: ICD-10-CM

## 2024-10-21 PROCEDURE — 97140 MANUAL THERAPY 1/> REGIONS: CPT

## 2024-10-21 PROCEDURE — 97110 THERAPEUTIC EXERCISES: CPT

## 2024-10-21 PROCEDURE — 97162 PT EVAL MOD COMPLEX 30 MIN: CPT

## 2024-10-21 NOTE — THERAPY EVALUATION
Lisbet Erickson  : 1980  Primary: [employer] (Medicaid Managed)  Secondary:  [facility] [place]  [address]  [place] [address]  Phone: [phone]  Fax: [phone] Plan Frequency: 1-2 times per week    Plan of Care/Certification Expiration Date: 25        Plan of Care/Certification Expiration Date:  Plan of Care/Certification Expiration Date: 25    Frequency/Duration: Plan Frequency: 1-2 times per week      Time In/Out:   Time In: 0415  Time Out: 0500      PT Visit Info:         Visit Count:  1                OUTPATIENT PHYSICAL THERAPY:             Initial Assessment 10/21/2024               Episode (Left leg pain)         Treatment Diagnosis:     Pain in right hip  Weakness of right hip  Other muscle spasm  Medical/Referring Diagnosis:    Sciatic mononeuropathy, left    Referring Physician:  Sheldon Eckert DO MD Orders:  PT Eval and Treat   Return MD Appt:  TBD  Date of Onset:  Onset Date: 24     Allergies:  Olanzapine and Metronidazole  Restrictions/Precautions:    None      Medications Last Reviewed:  10/21/2024     SUBJECTIVE   History of Injury/Illness (Reason for Referral):  Patient is a 44 yr old female who arrives to clinic with complaints of right hip pain and radiating lateral leg pain to her lateral lower leg. No N/T in the foot or leg. No pain into foot. Patient notes that sitting on a toilet or low chair causes pain. Rolling in bed causes pain. Pulling her hip into external rotation improves her pain. She notes the pain can reach a 9/10 and last for up to 5 min. The pain will ease with hip ER almost immediately. She denies significant change in low back pain.    Patient Stated Goal(s):  Patient wants pain to improve.   Initial Pain Level:     5  /10   Post Session Pain Level:     5 /10  Past Medical History/Comorbidities:   Ms. Erickson  has a past medical history of Anorexia, Anxiety, Depressive disorder, not

## 2024-10-22 NOTE — PROGRESS NOTES
repetitions and complexity of movement as indicated.  MANUAL THERAPY: (see below for minutes): Joint mobilization and Soft tissue mobilization was utilized and necessary because of the patient's restricted joint motion, painful spasm, loss of articular motion and restricted motion of soft tissue.   MODALITIES: (see below for minutes): to decrease pain   SELF CARE: (see below for minutes): Procedure(s) (per grid) utilized to improve and/or restore self-care/home management as related to dressing, bathing and grooming. Required minimal verbal cueing to facilitate activities of daily living skills and compensatory activities   Date: 10/21/24  Visit 1  evaluation       Modalities:                                Therapeutic Exercise: 15 min        Education regarding diagnosis and progression of care with explanation of anatomy and pathology    Education in HEP with instruction figure 4 hip ER 1-3 sec holds x 10 x 3 times per week                                               Proprioceptive Activities:                                Manual Therapy: 10 min        STM right hip ERs, glut med, min               Functional Activities:                                            Treatment/Session Summary:    Treatment Assessment:   See evaluation  Communication/Consultation:  Spoke with patient in regards to PT POC.  Equipment provided today:  HEP  Recommendations/Intent for next treatment session: Next visit will focus on right hip pain relief and mobility.    >Total Treatment Billable Duration:  45 minutes   Time In: 0415  Time Out: 0500    RAOUL SYLVESTER PT         Charge Capture  Events  [facility]  Appt Desk  Attendance Report     Future Appointments   Date Time Provider Department Center   10/24/2024  4:15 PM Raoul Sylvester PT [facility]   10/28/2024  3:30 PM Ashely Funk PTA [facility]   10/30/2024  2:00 PM Ashely Funk PTA [facility]   11/5/2024  2:00 PM Ashely Funk PTA [facility]   11/7/2024

## 2024-10-24 ENCOUNTER — HOSPITAL ENCOUNTER (OUTPATIENT)
Dept: PHYSICAL THERAPY | Age: 44
Setting detail: RECURRING SERIES
Discharge: HOME | End: 2024-10-27
Attending: INTERNAL MEDICINE
Payer: MEDICAID

## 2024-10-24 PROCEDURE — 97110 THERAPEUTIC EXERCISES: CPT

## 2024-10-24 PROCEDURE — 97140 MANUAL THERAPY 1/> REGIONS: CPT

## 2024-10-24 NOTE — PROGRESS NOTES
Lisbet Erickson  : 1980  Primary: [employer] (Medicaid Managed)  Secondary:  [facility] [place]  [place] [address]  Phone: [phone]  Fax: [phone] Plan Frequency: 1-2 times per week    Plan of Care/Certification Expiration Date: 25        Plan of Care/Certification Expiration Date:  Plan of Care/Certification Expiration Date: 25    Frequency/Duration:   Plan Frequency: 1-2 times per week      Time In/Out:   Time In: 0415  Time Out: 0500      PT Visit Info:         Visit Count:  2    OUTPATIENT PHYSICAL THERAPY:   Treatment Note 10/24/2024       Episode  (Left leg pain)               Treatment Diagnosis:    Pain in right hip  Weakness of right hip  Other muscle spasm  Medical/Referring Diagnosis:    Sciatic mononeuropathy, left    Referring Physician:  Sheldon Eckert DO MD Orders:  PT Eval and Treat   Return MD Appt:  tbd   Date of Onset:  Onset Date: 24     Allergies:   Olanzapine and Metronidazole  Restrictions/Precautions:   None      Interventions Planned (Treatment may consist of any combination of the following):     See Assessment Note    Subjective Comments:   See eval  Initial Pain Level::     5 /10  Post Session Pain Level:       5 /10  Medications Last Reviewed:  10/24/2024  Updated Objective Findings:      10/24/24:  Active DF: painful anterior lateral ankle right  Passive DF: stiff/pain at end range anterior lateral ankle  Active inversion: painful anterior lateral ankle right  Passive inversion: painful anterior lateral ankle right  Passive and active eversion are good no pain  DF: 6 deg right pain / 20 deg left non painful    Treatment   TREATMENT:   THERAPEUTIC ACTIVITY: ( see below for minutes): Therapeutic activities per grid below to improve mobility, strength, balance and coordination. Required minimal visual, verbal, manual and tactile cues to improve independence and safety with daily activities

## 2024-10-28 ENCOUNTER — HOSPITAL ENCOUNTER (OUTPATIENT)
Dept: PHYSICAL THERAPY | Age: 44
Setting detail: RECURRING SERIES
Discharge: HOME | End: 2024-10-31
Attending: INTERNAL MEDICINE
Payer: MEDICAID

## 2024-10-28 PROCEDURE — 97140 MANUAL THERAPY 1/> REGIONS: CPT

## 2024-10-28 PROCEDURE — 97110 THERAPEUTIC EXERCISES: CPT

## 2024-10-28 NOTE — PROGRESS NOTES
independence and safety with daily activities .  THERAPEUTIC EXERCISE: (see below for minutes): Exercises per grid below to improve mobility, strength, balance and coordination. Required minimal verbal and manual cues to promote proper body alignment, promote proper body posture and promote proper body mechanics. Progressed resistance, range, repetitions and complexity of movement as indicated.  MANUAL THERAPY: (see below for minutes): Joint mobilization and Soft tissue mobilization was utilized and necessary because of the patient's restricted joint motion, painful spasm, loss of articular motion and restricted motion of soft tissue.   MODALITIES: (see below for minutes): to decrease pain   SELF CARE: (see below for minutes): Procedure(s) (per grid) utilized to improve and/or restore self-care/home management as related to dressing, bathing and grooming. Required minimal verbal cueing to facilitate activities of daily living skills and compensatory activities   Date: 10/21/24  Visit 1  evaluation 10/24/24  Visit 2 10/28/24 (visit 3)     Modalities:                                Therapeutic Exercise: 15 min 30 min 28 min      Education regarding diagnosis and progression of care with explanation of anatomy and pathology    Education in HEP with instruction figure 4 hip ER 1-3 sec holds x 10 x 3 times per week Review of hep and revision of hip stretches    HEP: stand up every 1 hour for hip    Resisted ankle PF 3 x 10 x RTB in supine with bolster under knees    Ankle pumps with legs supported x 30    Iso hip abduction and iso hip adduction 5 sec holds x 10 Assisted stretching:  SKTC 10 sec hold x 6 B    Lower trunk rotation x20 B    Hip adduction in hook lying x30 with pillow    PF with red band with R LE on bolster 2x10    SAQ 3x10 with R LE on bolster                                                 Proprioceptive Activities:                                Manual Therapy: 10 min 15 min 15 min      STM right hip

## 2024-10-30 ENCOUNTER — HOSPITAL ENCOUNTER (OUTPATIENT)
Dept: PHYSICAL THERAPY | Age: 44
Setting detail: RECURRING SERIES
Discharge: HOME | End: 2024-11-02
Attending: INTERNAL MEDICINE
Payer: MEDICAID

## 2024-10-30 PROCEDURE — 97140 MANUAL THERAPY 1/> REGIONS: CPT

## 2024-10-30 PROCEDURE — 97110 THERAPEUTIC EXERCISES: CPT

## 2024-10-30 NOTE — PROGRESS NOTES
Lisbet Erickson  : 1980  Primary: [employer] (Medicaid Managed)  Secondary:  [facility] [place] [address]  Phone: [phone]  Fax: [phone] Plan Frequency: 1-2 times per week    Plan of Care/Certification Expiration Date: 25        Plan of Care/Certification Expiration Date:  Plan of Care/Certification Expiration Date: 25    Frequency/Duration:   Plan Frequency: 1-2 times per week      Time In/Out:   Time In: 1356  Time Out: 1441      PT Visit Info:         Visit Count:  4    OUTPATIENT PHYSICAL THERAPY:   Treatment Note 10/30/2024       Episode  (Left leg pain)               Treatment Diagnosis:    Pain in right hip  Weakness of right hip  Other muscle spasm  Medical/Referring Diagnosis:    Sciatic mononeuropathy, left    Referring Physician:  Sheldon Eckert DO MD Orders:  PT Eval and Treat   Return MD Appt:  tbd   Date of Onset:  Onset Date: 24     Allergies:   Olanzapine and Metronidazole  Restrictions/Precautions:   None      Interventions Planned (Treatment may consist of any combination of the following):     See Assessment Note    Subjective Comments:   Pt states \"I was standing in the house and my right ankle just gave away. I hit the floor.\"  Initial Pain Level::     moderate pain in the R ankle  Post Session Pain Level:      mild increase with R lateral LE pain  Medications Last Reviewed:  10/30/2024  Updated Objective Findings:      10/24/24:  Active DF: painful anterior lateral ankle right  Passive DF: stiff/pain at end range anterior lateral ankle  Active inversion: painful anterior lateral ankle right  Passive inversion: painful anterior lateral ankle right  Passive and active eversion are good no pain  DF: 6 deg right pain / 20 deg left non painful    Treatment   TREATMENT:   THERAPEUTIC ACTIVITY: ( see below for minutes): Therapeutic activities per grid below to improve mobility, strength,

## 2024-11-05 ENCOUNTER — HOSPITAL ENCOUNTER (OUTPATIENT)
Dept: PHYSICAL THERAPY | Age: 44
Setting detail: RECURRING SERIES
Discharge: HOME | End: 2024-11-08
Attending: INTERNAL MEDICINE
Payer: MEDICAID

## 2024-11-05 PROCEDURE — 97110 THERAPEUTIC EXERCISES: CPT

## 2024-11-05 PROCEDURE — 97140 MANUAL THERAPY 1/> REGIONS: CPT

## 2024-11-05 NOTE — PROGRESS NOTES
Lisbet Erickson  : 1980  Primary: [employer] (Medicaid Managed)  Secondary:  [facility] @ [place] [address]  Phone: [phone]  Fax: [phone] Plan Frequency: 1-2 times per week    Plan of Care/Certification Expiration Date: 25        Plan of Care/Certification Expiration Date:  Plan of Care/Certification Expiration Date: 25    Frequency/Duration:   Plan Frequency: 1-2 times per week      Time In/Out:   Time In: 1400  Time Out: 1445      PT Visit Info:         Visit Count:  5    OUTPATIENT PHYSICAL THERAPY:   Treatment Note 2024       Episode  (Left leg pain)               Treatment Diagnosis:    Pain in right hip  Weakness of right hip  Other muscle spasm  Medical/Referring Diagnosis:    Sciatic mononeuropathy, left    Referring Physician:  Sheldon Eckert DO MD Orders:  PT Eval and Treat   Return MD Appt:  tbd   Date of Onset:  Onset Date: 24     Allergies:   Olanzapine and Metronidazole  Restrictions/Precautions:   None      Interventions Planned (Treatment may consist of any combination of the following):     See Assessment Note    Subjective Comments:   Pt reports no change in pain. She has pain transferring from sitting to standing. She reports shooting pain down the lateral thigh with knee extension.  Initial Pain Level::     moderate pain in the R LE  Post Session Pain Level:     no increase  Medications Last Reviewed:  2024  Updated Objective Findings:      10/24/24:  Active DF: painful anterior lateral ankle right  Passive DF: stiff/pain at end range anterior lateral ankle  Active inversion: painful anterior lateral ankle right  Passive inversion: painful anterior lateral ankle right  Passive and active eversion are good no pain  DF: 6 deg right pain / 20 deg left non painful    Treatment   TREATMENT:   THERAPEUTIC ACTIVITY: ( see below for minutes): Therapeutic activities per grid below to

## 2024-11-07 ENCOUNTER — HOSPITAL ENCOUNTER (OUTPATIENT)
Dept: PHYSICAL THERAPY | Age: 44
Setting detail: RECURRING SERIES
Discharge: HOME | End: 2024-11-10
Attending: INTERNAL MEDICINE
Payer: MEDICAID

## 2024-11-07 PROCEDURE — 97140 MANUAL THERAPY 1/> REGIONS: CPT

## 2024-11-07 PROCEDURE — 97110 THERAPEUTIC EXERCISES: CPT

## 2024-11-07 NOTE — PROGRESS NOTES
Lisbet Erickson  : 1980  Primary: [employer] Medicaid (Medicaid Managed)  Secondary:  [facility] [place]  [place] [address]  Phone: [phone]  Fax: [phone] Plan Frequency: 1-2 times per week    Plan of Care/Certification Expiration Date: 25        Plan of Care/Certification Expiration Date:  Plan of Care/Certification Expiration Date: 25    Frequency/Duration:   Plan Frequency: 1-2 times per week      Time In/Out:   Time In: 1400  Time Out: 1445      PT Visit Info:         Visit Count:  6    OUTPATIENT PHYSICAL THERAPY:   Treatment Note 2024       Episode  (Left leg pain)               Treatment Diagnosis:    Pain in right hip  Weakness of right hip  Other muscle spasm  Medical/Referring Diagnosis:    Sciatic mononeuropathy, left    Referring Physician:  Sheldon Eckert DO MD Orders:  PT Eval and Treat   Return MD Appt:  tbd   Date of Onset:  Onset Date: 24     Allergies:   Olanzapine and Metronidazole  Restrictions/Precautions:   None      Interventions Planned (Treatment may consist of any combination of the following):     See Assessment Note    Subjective Comments:   Pt reports increased pain since her last PT visit.  Initial Pain Level::    8/10 R LE  Post Session Pain Level:     no increase  Medications Last Reviewed:  2024  Updated Objective Findings:      10/24/24:  Active DF: painful anterior lateral ankle right  Passive DF: stiff/pain at end range anterior lateral ankle  Active inversion: painful anterior lateral ankle right  Passive inversion: painful anterior lateral ankle right  Passive and active eversion are good no pain  DF: 6 deg right pain / 20 deg left non painful    Treatment   TREATMENT:   THERAPEUTIC ACTIVITY: ( see below for minutes): Therapeutic activities per grid below to improve mobility, strength, balance and coordination. Required minimal visual, verbal, manual and tactile cues to improve

## 2024-11-12 ENCOUNTER — HOSPITAL ENCOUNTER (OUTPATIENT)
Dept: PHYSICAL THERAPY | Age: 44
Setting detail: RECURRING SERIES
Discharge: HOME | End: 2024-11-15
Attending: INTERNAL MEDICINE
Payer: MEDICAID

## 2024-11-12 PROCEDURE — 97140 MANUAL THERAPY 1/> REGIONS: CPT

## 2024-11-12 PROCEDURE — 97110 THERAPEUTIC EXERCISES: CPT

## 2024-11-12 NOTE — PROGRESS NOTES
Lisbet Erickson  : 1980  Primary: [employer] (Medicaid Managed)  Secondary:  [facility] @ [place] [address]  Phone: [phone]  Fax: [phone] Plan Frequency: 1-2 times per week    Plan of Care/Certification Expiration Date: 25        Plan of Care/Certification Expiration Date:  Plan of Care/Certification Expiration Date: 25    Frequency/Duration:   Plan Frequency: 1-2 times per week      Time In/Out:   Time In: 0330  Time Out: 415      PT Visit Info:         Visit Count:  7    OUTPATIENT PHYSICAL THERAPY:   Treatment Note 2024       Episode  (Left leg pain)               Treatment Diagnosis:    Pain in right hip  Weakness of right hip  Other muscle spasm  Medical/Referring Diagnosis:    Sciatic mononeuropathy, left    Referring Physician:  Sheldon Eckert DO MD Orders:  PT Eval and Treat   Return MD Appt:  tbd   Date of Onset:  Onset Date: 24     Allergies:   Olanzapine and Metronidazole  Restrictions/Precautions:   None      Interventions Planned (Treatment may consist of any combination of the following):     See Assessment Note    Subjective Comments:   Patient continues to note pain with sitting on the toilet and sleeping. Rolling is painful. She notes she uses a tall stool to prop her feet up while on the toilet to prevent her leg from going to sleep. When she uses the stool her hip gets painful.    Initial Pain Level::    0/10 R LE  Post Session Pain Level:     no increase  Medications Last Reviewed:  2024  Updated Objective Findings:      10/24/24:  Active DF: painful anterior lateral ankle right  Passive DF: stiff/pain at end range anterior lateral ankle  Active inversion: painful anterior lateral ankle right  Passive inversion: painful anterior lateral ankle right  Passive and active eversion are good no pain  DF: 6 deg right pain / 20 deg left non painful    Treatment   TREATMENT:

## 2024-11-14 ENCOUNTER — HOSPITAL ENCOUNTER (OUTPATIENT)
Dept: PHYSICAL THERAPY | Age: 44
Setting detail: RECURRING SERIES
Discharge: HOME | End: 2024-11-17
Attending: INTERNAL MEDICINE
Payer: MEDICAID

## 2024-11-14 PROCEDURE — 97140 MANUAL THERAPY 1/> REGIONS: CPT

## 2024-11-14 PROCEDURE — 97110 THERAPEUTIC EXERCISES: CPT

## 2024-11-14 NOTE — PROGRESS NOTES
Lisbet Erickson  : 1980  Primary: [employer] (Medicaid Managed)  Secondary:  [facility] @ [place]  [place] [address]  Phone: [phone]  Fax: [phone] Plan Frequency: 1-2 times per week    Plan of Care/Certification Expiration Date: 25        Plan of Care/Certification Expiration Date:  Plan of Care/Certification Expiration Date: 25    Frequency/Duration:   Plan Frequency: 1-2 times per week      Time In/Out:   Time In: 1355  Time Out: 1440      PT Visit Info:         Visit Count:  8    OUTPATIENT PHYSICAL THERAPY:   Treatment Note 2024       Episode  (Left leg pain)               Treatment Diagnosis:    Pain in right hip  Weakness of right hip  Other muscle spasm  Medical/Referring Diagnosis:    Sciatic mononeuropathy, left    Referring Physician:  Sheldon Eckert DO MD Orders:  PT Eval and Treat   Return MD Appt:  tbd   Date of Onset:  Onset Date: 24     Allergies:   Olanzapine and Metronidazole  Restrictions/Precautions:   None      Interventions Planned (Treatment may consist of any combination of the following):     See Assessment Note    Subjective Comments:   Patient reports R hip pain with internal rotation and thinks the hip pain is causing her back pain. She states that the stool is helping at home.    Initial Pain Level::    6/10 R LE  Post Session Pain Level:     Pt reported increased pain with activity.  Medications Last Reviewed:  2024  Updated Objective Findings:      10/24/24:  Active DF: painful anterior lateral ankle right  Passive DF: stiff/pain at end range anterior lateral ankle  Active inversion: painful anterior lateral ankle right  Passive inversion: painful anterior lateral ankle right  Passive and active eversion are good no pain  DF: 6 deg right pain / 20 deg left non painful    Treatment   TREATMENT:   THERAPEUTIC ACTIVITY: ( see below for minutes): Therapeutic activities per grid

## 2024-11-19 ENCOUNTER — APPOINTMENT (OUTPATIENT)
Dept: PHYSICAL THERAPY | Age: 44
End: 2024-11-19
Attending: INTERNAL MEDICINE
Payer: MEDICAID

## 2024-11-21 ENCOUNTER — APPOINTMENT (OUTPATIENT)
Dept: PHYSICAL THERAPY | Age: 44
End: 2024-11-21
Attending: INTERNAL MEDICINE
Payer: MEDICAID

## 2024-11-26 ENCOUNTER — APPOINTMENT (OUTPATIENT)
Dept: PHYSICAL THERAPY | Age: 44
End: 2024-11-26
Attending: INTERNAL MEDICINE
Payer: MEDICAID

## 2024-12-02 ENCOUNTER — HOSPITAL ENCOUNTER (OUTPATIENT)
Dept: PHYSICAL THERAPY | Age: 44
Setting detail: RECURRING SERIES
Discharge: HOME | End: 2024-12-05
Attending: INTERNAL MEDICINE
Payer: MEDICAID

## 2024-12-02 PROCEDURE — 97110 THERAPEUTIC EXERCISES: CPT

## 2024-12-02 NOTE — PROGRESS NOTES
Lisbet Erickson  : 1980  Primary: [employer] (Medicaid Managed)  Secondary:  [facility] @ [place] [address]  Phone: [phone]  Fax: [phone] Plan Frequency: 1-2 times per week    Plan of Care/Certification Expiration Date: 25        Plan of Care/Certification Expiration Date:  Plan of Care/Certification Expiration Date: 25    Frequency/Duration:   Plan Frequency: 1-2 times per week      Time In/Out:   Time In: 845  Time Out: 930      PT Visit Info:         Visit Count:  9    OUTPATIENT PHYSICAL THERAPY:   Re-certification/Progress Note/Treatment Note 2024       Episode  (Left leg pain)               Treatment Diagnosis:    Pain in right hip  Weakness of right hip  Other muscle spasm  Medical/Referring Diagnosis:    Sciatic mononeuropathy, left    Referring Physician:  Sheldon Eckert DO MD Orders:  PT Eval and Treat   Return MD Appt:  tbd   Date of Onset:  Onset Date: 24     Allergies:   Olanzapine and Metronidazole  Restrictions/Precautions:   None      Interventions Planned (Treatment may consist of any combination of the following):     See Assessment Note    Subjective Comments:   Patient notes that the legs continue to bother her the past few weeks. Patient notes that her legs are real sore and achy. As long as laying down the legs dont bother but sitting up the legs bother her. Notes aching in the bilateral anterior thighs and lateral legs.     Initial Pain Level::    6/10   Post Session Pain Level:     6/10  Medications Last Reviewed:  2024  Updated Objective Findings:      10/24/24:  Active DF: painful anterior lateral ankle right  Passive DF: stiff/pain at end range anterior lateral ankle  Active inversion: painful anterior lateral ankle right  Passive inversion: painful anterior lateral ankle right  Passive and active eversion are good no pain  DF: 6 deg right pain / 20 deg left non

## 2024-12-02 NOTE — THERAPY RECERTIFICATION
Lisbet Erickson  : 1980  Primary: [employer] (Medicaid Managed)  Secondary:  [facility] @ [place] [address]  Phone: [phone]  Fax: [phone] Plan Frequency: 1-2 times per week    Plan of Care/Certification Expiration Date: 25        Plan of Care/Certification Expiration Date:  Plan of Care/Certification Expiration Date: 25    Frequency/Duration: Plan Frequency: 1-2 times per week      Time In/Out:   Time In: 845  Time Out: 930      PT Visit Info:         Visit Count:  9                OUTPATIENT PHYSICAL THERAPY:             Therapy Re-certification 2024               Episode (Left leg pain)         Treatment Diagnosis:     Pain in right hip  Weakness of right hip  Other muscle spasm  Medical/Referring Diagnosis:    Sciatic mononeuropathy, left    Referring Physician:  Sheldon Eckert DO MD Orders:  PT Eval and Treat   Return MD Appt:  TBD  Date of Onset:  Onset Date: 24     Allergies:  Olanzapine and Metronidazole  Restrictions/Precautions:    None      Medications Last Reviewed:  2024     SUBJECTIVE   History of Injury/Illness (Reason for Referral):  Patient is a 44 yr old female who arrives to clinic with complaints of right hip pain and radiating lateral leg pain to her lateral lower leg. No N/T in the foot or leg. No pain into foot. Patient notes that sitting on a toilet or low chair causes pain. Rolling in bed causes pain. Pulling her hip into external rotation improves her pain. She notes the pain can reach a 9/10 and last for up to 5 min. The pain will ease with hip ER almost immediately. She denies significant change in low back pain.    24: Patient notes that the legs continue to bother her the past few weeks. Patient notes that her legs are real sore and achy. As long as laying down the legs dont bother but sitting up the legs bother her. Notes aching in the bilateral anterior

## 2024-12-03 ENCOUNTER — APPOINTMENT (OUTPATIENT)
Dept: PHYSICAL THERAPY | Age: 44
End: 2024-12-03
Attending: INTERNAL MEDICINE
Payer: MEDICAID

## 2024-12-05 ENCOUNTER — APPOINTMENT (OUTPATIENT)
Dept: PHYSICAL THERAPY | Age: 44
End: 2024-12-05
Attending: INTERNAL MEDICINE
Payer: MEDICAID

## 2024-12-06 ENCOUNTER — HOSPITAL ENCOUNTER (OUTPATIENT)
Dept: PHYSICAL THERAPY | Age: 44
Setting detail: RECURRING SERIES
Discharge: HOME | End: 2024-12-09
Attending: INTERNAL MEDICINE
Payer: MEDICAID

## 2024-12-06 PROCEDURE — 97110 THERAPEUTIC EXERCISES: CPT

## 2024-12-06 PROCEDURE — 97140 MANUAL THERAPY 1/> REGIONS: CPT

## 2024-12-06 NOTE — PROGRESS NOTES
Lisbet Erickson  : 1980  Primary: [employer] (Medicaid Managed)  Secondary:  [facility] [place] [address]  Phone: [phone]  Fax: [phone] Plan Frequency: 1-2 times per week    Plan of Care/Certification Expiration Date: 25        Plan of Care/Certification Expiration Date:  Plan of Care/Certification Expiration Date: 25    Frequency/Duration:   Plan Frequency: 1-2 times per week      Time In/Out:   Time In: 1100  Time Out: 1145      PT Visit Info:         Visit Count:  10    OUTPATIENT PHYSICAL THERAPY:   Treatment Note 2024       Episode  (Left leg pain)               Treatment Diagnosis:    Pain in right hip  Weakness of right hip  Other muscle spasm  Medical/Referring Diagnosis:    Sciatic mononeuropathy, left    Referring Physician:  Sheldon Eckert DO MD Orders:  PT Eval and Treat   Return MD Appt:  tbd   Date of Onset:  Onset Date: 24     Allergies:   Olanzapine and Metronidazole  Restrictions/Precautions:   None      Interventions Planned (Treatment may consist of any combination of the following):     See Assessment Note    Subjective Comments:   Patient states \"I'm not doing good. I twisted my ankle and I fell last night.\"  Pt states that the R ankle twisted first and then she fell.    Initial Pain Level::   high level of R hip and R ankle pain  Post Session Pain Level:     Pt reported relief from the roller.  Medications Last Reviewed:  2024  Updated Objective Findings:      10/24/24:  Active DF: painful anterior lateral ankle right  Passive DF: stiff/pain at end range anterior lateral ankle  Active inversion: painful anterior lateral ankle right  Passive inversion: painful anterior lateral ankle right  Passive and active eversion are good no pain  DF: 6 deg right pain / 20 deg left non painful    24 PN/Re-cert:  SLS right: 5 sec - painful  SLS left: 8-9 sec  Hip abduction right

## 2024-12-09 ENCOUNTER — HOSPITAL ENCOUNTER (OUTPATIENT)
Dept: PHYSICAL THERAPY | Age: 44
Setting detail: RECURRING SERIES
Discharge: HOME | End: 2024-12-12
Attending: INTERNAL MEDICINE
Payer: MEDICAID

## 2024-12-09 PROCEDURE — 97110 THERAPEUTIC EXERCISES: CPT

## 2024-12-09 NOTE — PROGRESS NOTES
0110hr - complained of feeling 'funny' in her heart, denies chest pain. EKG done, Dr Concepcion informed and said will come to see pt.   BP increased to 163/68, feels anxious with mild SOB, spo2 on 2L nasal cannula is 90%.     At 0125hrs /66 (104), spo2 92%. Pt verbalized feeling a little better.    0135hrs, pt seen and assessed by Dr Concepcion. Will continue to monitor.     grade 1-2    IASTM to R hip in side lying with pillow between knees x10 min Right hip long axis distraction grade 3-4    Hip PPM flexion and ER grade 2-3    STM right glut med, piriformis                 Functional Activities:                                                                          Treatment/Session Summary:    Treatment Assessment:   Patient responded well to KT taping to the right ankle and ankle mobility exercises. She was advised to continue movement of the ankle as tolerated.     Communication/Consultation:  Spoke with patient in regards to PT POC.  Equipment provided today:  HEP  Recommendations/Intent for next treatment session: Next visit will focus on right hip pain relief and mobility.    >Total Treatment Billable Duration:  53 minutes   Time In: 0245  Time Out: 0338    RAOUL ARGUELLO PT         Charge Capture  Events  [employer] Portal  Appt Desk  Attendance Report     Future Appointments   Date Time Provider Department Center   12/13/2024 11:45 AM Ashely Funk, PTA [facility]   12/16/2024 11:00 AM Raoul Arguello, PT [facility]   12/18/2024  2:00 PM Raoul Arguello, PT [facility]   12/23/2024 11:00 AM Raoul Arguello, PT [facility]   1/2/2025  2:45 PM Raoul Arguello, PT [facility]

## 2024-12-11 ENCOUNTER — APPOINTMENT (OUTPATIENT)
Dept: PHYSICAL THERAPY | Age: 44
End: 2024-12-11
Attending: INTERNAL MEDICINE
Payer: MEDICAID

## 2024-12-13 ENCOUNTER — HOSPITAL ENCOUNTER (OUTPATIENT)
Dept: PHYSICAL THERAPY | Age: 44
Setting detail: RECURRING SERIES
Discharge: HOME | End: 2024-12-16
Attending: INTERNAL MEDICINE
Payer: MEDICAID

## 2024-12-13 PROCEDURE — 97140 MANUAL THERAPY 1/> REGIONS: CPT

## 2024-12-13 PROCEDURE — 97110 THERAPEUTIC EXERCISES: CPT

## 2024-12-13 NOTE — PROGRESS NOTES
time Educated in hip mechanics and techniques to avoid hip aggravation    Educated in use of a LE bolster for legs    Educated on use of a smaller step for support of her foot during use of the toilet    Fall outs x 40    Hip fexor stretch in standing x 20 x 1 sec hold    Hip iso abduction 10 x 10 sec holds Bent knee fall outs x30 supine    B short arc quad x30    Hip adduction with pillow in supine x30    Hip abduction isometric in supine with blue band 5 sec hold x15    Standing hip flexor stretch 30 sec hold x 4 B Nustep x 5 min    STS 2 x 8    Standing hip abduction 3 x 10    Standing hip extension 2 x 10    Heel raises x 30    Seated toe raises x 2 min    LAQ x 40 gricelda    Seated hip iso abduction 5 x 10 sec holds with RTB    Standing marches x 40 gricelda     Stepper bike x6 min (slow pace)    Sit to stand from raised mat table 2x8    Seated DF 3x10    Seated PF 3x10     Isoadduction 10 sec x 10    Iso abduction 10 x 10 sec    Ankle AROM all planes x 30    Toe raises x 2 min    Heel toe raises x 30    Resisted inversion/eversion/DF/PF x 15 x RTB    Ball LTR x 30    Supine marches x 30    Bridges x 20    AP distal tib-fib glide taping KT Stepper bike x5 min rocking    Lower trunk rotation 10 sec hold x10 B    Supine hip adduction x30 with roll    Bridges 3x10                    Assisted SKTC 10 sec hold x6 B                                                                      Proprioceptive Activities:                                                            Manual Therapy: 10 min 15 min 15 min 10 min 10 min 10 min 15 min 15 min  20 min 5 min 15 min    STM right hip ERs, glut med, min Right hip distraction grade 2-3    STM right quad, RF, TFL, glut med, min, piriformis R hip distractiongrade 1-2    IASTM with roller to R IT band, gentle R hip distraction grade 1-2    IASTM with roller to R IT band and glutes, gentle R hip lateral distraction grade 1-2     Long axis distraction R hip long axis distraction    IASTM

## 2024-12-16 ENCOUNTER — APPOINTMENT (OUTPATIENT)
Dept: PHYSICAL THERAPY | Age: 44
End: 2024-12-16
Attending: INTERNAL MEDICINE
Payer: MEDICAID

## 2024-12-18 ENCOUNTER — HOSPITAL ENCOUNTER (OUTPATIENT)
Dept: PHYSICAL THERAPY | Age: 44
Setting detail: RECURRING SERIES
Discharge: HOME | End: 2024-12-21
Attending: INTERNAL MEDICINE
Payer: MEDICAID

## 2024-12-18 PROCEDURE — 97110 THERAPEUTIC EXERCISES: CPT

## 2024-12-18 PROCEDURE — 97140 MANUAL THERAPY 1/> REGIONS: CPT

## 2024-12-18 NOTE — PROGRESS NOTES
Lisbet Erickson  : 1980  Primary: [employer] (Medicaid Managed)  Secondary:  [facility] [place]  [place] [address]  Phone: [phone]  Fax: [phone] Plan Frequency: 1-2 times per week    Plan of Care/Certification Expiration Date: 25        Plan of Care/Certification Expiration Date:  Plan of Care/Certification Expiration Date: 25    Frequency/Duration:   Plan Frequency: 1-2 times per week      Time In/Out:   Time In: 0200  Time Out: 0245      PT Visit Info:         Visit Count:  13    OUTPATIENT PHYSICAL THERAPY:   Treatment Note 2024       Episode  (Left leg pain)               Treatment Diagnosis:    Pain in right hip  Weakness of right hip  Other muscle spasm  Medical/Referring Diagnosis:    Sciatic mononeuropathy, left    Referring Physician:  Sheldon Eckert DO MD Orders:  PT Eval and Treat   Return MD Appt:  tbd   Date of Onset:  Onset Date: 24     Allergies:   Olanzapine and Metronidazole  Restrictions/Precautions:   None      Interventions Planned (Treatment may consist of any combination of the following):     See Assessment Note    Subjective Comments: Patient continues to have right lateral ankle pain. She notes that her hip is feeling better though.     Initial Pain Level::   high level of R hip and R ankle pain  Post Session Pain Level:     Pt reported relief from the roller.  Medications Last Reviewed:  2024  Updated Objective Findings:      10/24/24:  Active DF: painful anterior lateral ankle right  Passive DF: stiff/pain at end range anterior lateral ankle  Active inversion: painful anterior lateral ankle right  Passive inversion: painful anterior lateral ankle right  Passive and active eversion are good no pain  DF: 6 deg right pain / 20 deg left non painful    24 PN/Re-cert:  SLS right: 5 sec - painful  SLS left: 8-9 sec  Hip abduction right 2+/5  Hip adduction right 2+/5  Hip ER

## 2024-12-20 ENCOUNTER — APPOINTMENT (OUTPATIENT)
Dept: PHYSICAL THERAPY | Age: 44
End: 2024-12-20
Attending: INTERNAL MEDICINE
Payer: MEDICAID

## 2024-12-23 ENCOUNTER — APPOINTMENT (OUTPATIENT)
Dept: PHYSICAL THERAPY | Age: 44
End: 2024-12-23
Attending: INTERNAL MEDICINE
Payer: MEDICAID

## 2025-03-04 ENCOUNTER — TRANSCRIBE ORDERS (OUTPATIENT)
Dept: SCHEDULING | Age: 45
End: 2025-03-04

## 2025-03-04 DIAGNOSIS — Z12.31 VISIT FOR SCREENING MAMMOGRAM: Primary | ICD-10-CM

## 2025-03-12 ENCOUNTER — HOSPITAL ENCOUNTER (OUTPATIENT)
Dept: MAMMOGRAPHY | Age: 45
Discharge: HOME | End: 2025-03-15
Attending: INTERNAL MEDICINE
Payer: MEDICAID

## 2025-03-12 DIAGNOSIS — Z12.31 VISIT FOR SCREENING MAMMOGRAM: ICD-10-CM

## 2025-03-12 PROCEDURE — 77063 BREAST TOMOSYNTHESIS BI: CPT

## 2025-05-05 ENCOUNTER — TELEPHONE (OUTPATIENT)
Dept: INTERNAL MEDICINE CLINIC | Facility: CLINIC | Age: 45
End: 2025-05-05

## 2025-05-05 DIAGNOSIS — R63.0 ANOREXIA: ICD-10-CM

## 2025-05-05 DIAGNOSIS — F51.04 PSYCHOPHYSIOLOGICAL INSOMNIA: ICD-10-CM

## 2025-05-05 RX ORDER — QUETIAPINE FUMARATE 300 MG/1
300 TABLET, FILM COATED ORAL EVERY EVENING
Qty: 30 TABLET | Refills: 0 | Status: SHIPPED | OUTPATIENT
Start: 2025-05-05

## 2025-05-05 RX ORDER — MIRTAZAPINE 7.5 MG/1
7.5 TABLET, FILM COATED ORAL NIGHTLY
Qty: 30 TABLET | Refills: 0 | Status: SHIPPED | OUTPATIENT
Start: 2025-05-05

## 2025-05-05 NOTE — TELEPHONE ENCOUNTER
NEEDS REFILLS ON    QUEtiapine (SEROQUEL) 300 MG tablet [[id number]]    Order Details  Dose: 300 mg Route: Oral Frequency: EVERY EVENING   Dispense Quantity: 30 tablet Refills: 5          Sig: Take 1 tablet by mouth every evening   AND    mirtazapine (REMERON) 7.5 MG tablet [[id number]]    Order Details  Dose: 7.5 mg Route: Oral Frequency: NIGHTLY   Dispense Quantity: 90 tablet Refills: 1          Sig: Take 1 tablet by mouth nightly     SEND TO   [facility]/pharmacy #[id number] - [place] [address] -  [phone] - F [phone]  [address] [place] [address], [place] [facility] [address]  Phone: [phone]  Fax: [phone]

## 2025-05-20 ENCOUNTER — OFFICE VISIT (OUTPATIENT)
Dept: INTERNAL MEDICINE CLINIC | Facility: CLINIC | Age: 45
End: 2025-05-20
Payer: MEDICAID

## 2025-05-20 VITALS
BODY MASS INDEX: 18.95 KG/M2 | SYSTOLIC BLOOD PRESSURE: 100 MMHG | WEIGHT: 103 LBS | DIASTOLIC BLOOD PRESSURE: 60 MMHG | HEIGHT: 62 IN | OXYGEN SATURATION: 97 % | TEMPERATURE: 97.9 F | HEART RATE: 112 BPM

## 2025-05-20 DIAGNOSIS — F51.04 PSYCHOPHYSIOLOGICAL INSOMNIA: ICD-10-CM

## 2025-05-20 DIAGNOSIS — M54.42 CHRONIC MIDLINE LOW BACK PAIN WITH BILATERAL SCIATICA: ICD-10-CM

## 2025-05-20 DIAGNOSIS — R63.0 ANOREXIA: ICD-10-CM

## 2025-05-20 DIAGNOSIS — R63.6 UNDERWEIGHT: Primary | ICD-10-CM

## 2025-05-20 DIAGNOSIS — F50.00: ICD-10-CM

## 2025-05-20 DIAGNOSIS — M54.31 BILATERAL SCIATICA: ICD-10-CM

## 2025-05-20 DIAGNOSIS — M54.41 CHRONIC MIDLINE LOW BACK PAIN WITH BILATERAL SCIATICA: ICD-10-CM

## 2025-05-20 DIAGNOSIS — G89.29 CHRONIC MIDLINE LOW BACK PAIN WITH BILATERAL SCIATICA: ICD-10-CM

## 2025-05-20 DIAGNOSIS — M54.32 BILATERAL SCIATICA: ICD-10-CM

## 2025-05-20 PROCEDURE — 99214 OFFICE O/P EST MOD 30 MIN: CPT | Performed by: INTERNAL MEDICINE

## 2025-05-20 RX ORDER — QUETIAPINE FUMARATE 300 MG/1
300 TABLET, FILM COATED ORAL EVERY EVENING
Qty: 30 TABLET | Refills: 11 | Status: SHIPPED | OUTPATIENT
Start: 2025-05-20

## 2025-05-20 RX ORDER — MIRTAZAPINE 7.5 MG/1
7.5 TABLET, FILM COATED ORAL NIGHTLY
Qty: 30 TABLET | Refills: 11 | Status: SHIPPED | OUTPATIENT
Start: 2025-05-20

## 2025-05-20 RX ORDER — GABAPENTIN 100 MG/1
CAPSULE ORAL
Qty: 30 CAPSULE | Refills: 5 | Status: SHIPPED | OUTPATIENT
Start: 2025-05-20 | End: 2025-07-20

## 2025-05-20 RX ORDER — POLYETHYLENE GLYCOL 3350 17 G/17G
17 POWDER, FOR SOLUTION ORAL PRN
COMMUNITY

## 2025-05-20 SDOH — ECONOMIC STABILITY: TRANSPORTATION INSECURITY
IN THE PAST 12 MONTHS, HAS LACK OF TRANSPORTATION KEPT YOU FROM MEETINGS, WORK, OR FROM GETTING THINGS NEEDED FOR DAILY LIVING?: NO

## 2025-05-20 SDOH — ECONOMIC STABILITY: INCOME INSECURITY: IN THE LAST 12 MONTHS, WAS THERE A TIME WHEN YOU WERE NOT ABLE TO PAY THE MORTGAGE OR RENT ON TIME?: NO

## 2025-05-20 SDOH — ECONOMIC STABILITY: FOOD INSECURITY: WITHIN THE PAST 12 MONTHS, YOU WORRIED THAT YOUR FOOD WOULD RUN OUT BEFORE YOU GOT MONEY TO BUY MORE.: NEVER TRUE

## 2025-05-20 SDOH — ECONOMIC STABILITY: FOOD INSECURITY: WITHIN THE PAST 12 MONTHS, THE FOOD YOU BOUGHT JUST DIDN'T LAST AND YOU DIDN'T HAVE MONEY TO GET MORE.: NEVER TRUE

## 2025-05-20 SDOH — ECONOMIC STABILITY: TRANSPORTATION INSECURITY
IN THE PAST 12 MONTHS, HAS THE LACK OF TRANSPORTATION KEPT YOU FROM MEDICAL APPOINTMENTS OR FROM GETTING MEDICATIONS?: NO

## 2025-05-20 ASSESSMENT — PATIENT HEALTH QUESTIONNAIRE - PHQ9
10. IF YOU CHECKED OFF ANY PROBLEMS, HOW DIFFICULT HAVE THESE PROBLEMS MADE IT FOR YOU TO DO YOUR WORK, TAKE CARE OF THINGS AT HOME, OR GET ALONG WITH OTHER PEOPLE: SOMEWHAT DIFFICULT
1. LITTLE INTEREST OR PLEASURE IN DOING THINGS: SEVERAL DAYS
3. TROUBLE FALLING OR STAYING ASLEEP: MORE THAN HALF THE DAYS
4. FEELING TIRED OR HAVING LITTLE ENERGY: NOT AT ALL
2. FEELING DOWN, DEPRESSED OR HOPELESS: NOT AT ALL
8. MOVING OR SPEAKING SO SLOWLY THAT OTHER PEOPLE COULD HAVE NOTICED. OR THE OPPOSITE, BEING SO FIGETY OR RESTLESS THAT YOU HAVE BEEN MOVING AROUND A LOT MORE THAN USUAL: SEVERAL DAYS
SUM OF ALL RESPONSES TO PHQ QUESTIONS 1-9: 4
5. POOR APPETITE OR OVEREATING: NOT AT ALL
8. MOVING OR SPEAKING SO SLOWLY THAT OTHER PEOPLE COULD HAVE NOTICED. OR THE OPPOSITE - BEING SO FIDGETY OR RESTLESS THAT YOU HAVE BEEN MOVING AROUND A LOT MORE THAN USUAL: SEVERAL DAYS
7. TROUBLE CONCENTRATING ON THINGS, SUCH AS READING THE NEWSPAPER OR WATCHING TELEVISION: NOT AT ALL
10. IF YOU CHECKED OFF ANY PROBLEMS, HOW DIFFICULT HAVE THESE PROBLEMS MADE IT FOR YOU TO DO YOUR WORK, TAKE CARE OF THINGS AT HOME, OR GET ALONG WITH OTHER PEOPLE: SOMEWHAT DIFFICULT
5. POOR APPETITE OR OVEREATING: NOT AT ALL
SUM OF ALL RESPONSES TO PHQ QUESTIONS 1-9: 4
6. FEELING BAD ABOUT YOURSELF - OR THAT YOU ARE A FAILURE OR HAVE LET YOURSELF OR YOUR FAMILY DOWN: NOT AT ALL
2. FEELING DOWN, DEPRESSED OR HOPELESS: NOT AT ALL
SUM OF ALL RESPONSES TO PHQ QUESTIONS 1-9: 4
7. TROUBLE CONCENTRATING ON THINGS, SUCH AS READING THE NEWSPAPER OR WATCHING TELEVISION: NOT AT ALL
9. THOUGHTS THAT YOU WOULD BE BETTER OFF DEAD, OR OF HURTING YOURSELF: NOT AT ALL
4. FEELING TIRED OR HAVING LITTLE ENERGY: NOT AT ALL
9. THOUGHTS THAT YOU WOULD BE BETTER OFF DEAD, OR OF HURTING YOURSELF: NOT AT ALL
3. TROUBLE FALLING OR STAYING ASLEEP: MORE THAN HALF THE DAYS
SUM OF ALL RESPONSES TO PHQ QUESTIONS 1-9: 4
SUM OF ALL RESPONSES TO PHQ QUESTIONS 1-9: 4
1. LITTLE INTEREST OR PLEASURE IN DOING THINGS: SEVERAL DAYS
6. FEELING BAD ABOUT YOURSELF - OR THAT YOU ARE A FAILURE OR HAVE LET YOURSELF OR YOUR FAMILY DOWN: NOT AT ALL

## 2025-05-20 NOTE — PROGRESS NOTES
``````````````````````````````````````````````````````````````````````````````````````````````````````````````````````````````Lisbet Erickson (: 1980) is a 44 y.o. female, here for evaluation of the following chief complaint(s):  Follow-up (6 month check up), Medication Refill, and Leg Pain (Bilat leg pain)       Lisbet was seen today for follow-up, medication refill and leg pain.    Diagnoses and all orders for this visit:    Underweight    Psychophysiological insomnia  -     mirtazapine (REMERON) 7.5 MG tablet; Take 1 tablet by mouth nightly  -     QUEtiapine (SEROQUEL) 300 MG tablet; Take 1 tablet by mouth every evening    Anorexia  -     mirtazapine (REMERON) 7.5 MG tablet; Take 1 tablet by mouth nightly    Anorexia nervosa, unspecified (HCC)    Bilateral sciatica  -     Cancel: BS - Physical Therapy, [facility]  -     XR LUMBAR SPINE (2-3 VIEWS); Future    Chronic midline low back pain with bilateral sciatica  -     XR LUMBAR SPINE (2-3 VIEWS); Future    Other orders  -     gabapentin (NEURONTIN) 100 MG capsule; Intended supply: 60 days,  1-2 before  bed  -     diclofenac (VOLTAREN) 50 MG EC tablet; Take 1 tablet by mouth 2 times daily (with meals)        Assessment & Plan  1. Back pain.  - The patient's back pain is likely due to a pinched nerve, causing pain that radiates down both legs. She reports that the pain worsens with sitting and improves with lying down or standing.  - Physical therapy is recommended to help alleviate symptoms.  - Gabapentin 300 mg will be prescribed to be taken one or two tablets before bedtime to help with nerve pain. An x-ray of the back will be ordered to further investigate the cause of the pain. If the x-ray results are inconclusive, an MRI may be considered.  - Voltaren 50 mg will be prescribed to be taken twice daily with food for two weeks to help manage pain.    2. Insomnia.  - The patient's insomnia is likely secondary to her back pain. She

## 2025-05-21 ENCOUNTER — TELEPHONE (OUTPATIENT)
Dept: INTERNAL MEDICINE CLINIC | Facility: CLINIC | Age: 45
End: 2025-05-21

## 2025-05-21 ENCOUNTER — RESULTS FOLLOW-UP (OUTPATIENT)
Dept: INTERNAL MEDICINE CLINIC | Facility: CLINIC | Age: 45
End: 2025-05-21

## 2025-05-21 DIAGNOSIS — G89.29 CHRONIC MIDLINE LOW BACK PAIN WITH BILATERAL SCIATICA: ICD-10-CM

## 2025-05-21 DIAGNOSIS — M54.42 CHRONIC MIDLINE LOW BACK PAIN WITH BILATERAL SCIATICA: ICD-10-CM

## 2025-05-21 DIAGNOSIS — M54.31 BILATERAL SCIATICA: ICD-10-CM

## 2025-05-21 DIAGNOSIS — M54.41 CHRONIC MIDLINE LOW BACK PAIN WITH BILATERAL SCIATICA: ICD-10-CM

## 2025-05-21 DIAGNOSIS — M54.32 BILATERAL SCIATICA: ICD-10-CM

## 2025-05-21 NOTE — TELEPHONE ENCOUNTER
Dimple the patients mom called and states that the patient is still in a lot of pain. Patients mom would like to know if there is something Dr. Eckert can call in for her for pain. Please Advise.

## 2025-07-11 ENCOUNTER — OFFICE VISIT (OUTPATIENT)
Dept: INTERNAL MEDICINE CLINIC | Facility: CLINIC | Age: 45
End: 2025-07-11
Payer: MEDICAID

## 2025-07-11 VITALS
DIASTOLIC BLOOD PRESSURE: 50 MMHG | HEART RATE: 99 BPM | SYSTOLIC BLOOD PRESSURE: 90 MMHG | TEMPERATURE: 98 F | BODY MASS INDEX: 18.84 KG/M2 | OXYGEN SATURATION: 96 % | HEIGHT: 62 IN

## 2025-07-11 DIAGNOSIS — M54.41 CHRONIC MIDLINE LOW BACK PAIN WITH BILATERAL SCIATICA: ICD-10-CM

## 2025-07-11 DIAGNOSIS — M54.41 ACUTE RIGHT-SIDED LOW BACK PAIN WITH RIGHT-SIDED SCIATICA: ICD-10-CM

## 2025-07-11 DIAGNOSIS — M54.42 CHRONIC MIDLINE LOW BACK PAIN WITH BILATERAL SCIATICA: ICD-10-CM

## 2025-07-11 DIAGNOSIS — G89.29 CHRONIC MIDLINE LOW BACK PAIN WITH BILATERAL SCIATICA: ICD-10-CM

## 2025-07-11 DIAGNOSIS — M54.31 SCIATICA OF RIGHT SIDE: Primary | ICD-10-CM

## 2025-07-11 PROCEDURE — 99214 OFFICE O/P EST MOD 30 MIN: CPT | Performed by: INTERNAL MEDICINE

## 2025-07-11 RX ORDER — MELOXICAM 15 MG/1
15 TABLET ORAL DAILY
Qty: 30 TABLET | Refills: 1 | Status: SHIPPED | OUTPATIENT
Start: 2025-07-11

## 2025-07-11 RX ORDER — TIZANIDINE 2 MG/1
2 TABLET ORAL 3 TIMES DAILY PRN
Qty: 30 TABLET | Refills: 0 | Status: SHIPPED | OUTPATIENT
Start: 2025-07-11

## 2025-07-11 NOTE — PROGRESS NOTES
Lisbet Erickson (: 1980) is a 45 y.o. female, here for evaluation of the following chief complaint(s):  Back Pain and Other (C/O right leg gives away)   Had xray 2 months.  Ago rt sciatica  Assessment & Plan  1. Back pain.  - Reports experiencing shooting pain that radiates down her leg, which worsens when standing up from a chair. Pain is described as nerve-like and is present all over her back. No associated rash.  - Physical examination reveals tenderness and tightness in the muscles of the back. An x-ray of her lower back performed on 2025 did not reveal any abnormalities.  - Discussed the ineffectiveness of gabapentin and Tylenol for pain relief. Physical therapy is recommended to help manage symptoms.  - Prescribed meloxicam 15 mg once daily for 2 weeks with food and water, to be taken instead of Voltaren. Additionally, Zanaflex up to three times a day as needed for muscle spasms has been prescribed.    2. Low blood pressure.  - Blood pressure was noted to be low during the visit.  - Advised to increase intake of fluids and consume salty foods to help raise blood pressure.  - Discussed the importance of maintaining adequate hydration and electrolyte balance.  1. Sciatica of right side  -     tiZANidine (ZANAFLEX) 2 MG tablet; Take 1 tablet by mouth 3 times daily as needed (muscle pain), Disp-30 tablet, R-0Normal  -     [facility] [place]  2. Acute right-sided low back pain with right-sided sciatica  -     tiZANidine (ZANAFLEX) 2 MG tablet; Take 1 tablet by mouth 3 times daily as needed (muscle pain), Disp[facility]30 tablet[facility]  3. Chronic midline low back pain with bilateral sciatica  -     tiZANidine (ZANAFLEX) 2 MG tablet; Take 1 tablet by mouth 3 times daily as needed (muscle pain), Disp-30 tablet, R-0Normal  [facility] - Physical Therapy, [facility]    History of Present

## (undated) DEVICE — CONNECTOR TBNG OD5-7MM O2 END DISP

## (undated) DEVICE — CANNULA NSL ORAL AD FOR CAPNOFLEX CO2 O2 AIRLFE

## (undated) DEVICE — KENDALL RADIOLUCENT FOAM MONITORING ELECTRODE RECTANGULAR SHAPE: Brand: KENDALL

## (undated) DEVICE — BLOCK BITE AD 60FR W/ VELC STRP ADDRESSES MOST PT AND

## (undated) DEVICE — GEL MEDC ULTRASOUND 5L -- REPLACED BY 326862